# Patient Record
Sex: FEMALE | Race: WHITE | NOT HISPANIC OR LATINO | Employment: UNEMPLOYED | URBAN - METROPOLITAN AREA
[De-identification: names, ages, dates, MRNs, and addresses within clinical notes are randomized per-mention and may not be internally consistent; named-entity substitution may affect disease eponyms.]

---

## 2018-03-29 ENCOUNTER — OFFICE VISIT (OUTPATIENT)
Dept: INTERNAL MEDICINE | Facility: CLINIC | Age: 23
End: 2018-03-29

## 2018-03-29 VITALS
HEART RATE: 90 BPM | BODY MASS INDEX: 31.47 KG/M2 | HEIGHT: 55 IN | DIASTOLIC BLOOD PRESSURE: 68 MMHG | WEIGHT: 136 LBS | OXYGEN SATURATION: 100 % | TEMPERATURE: 98.7 F | SYSTOLIC BLOOD PRESSURE: 110 MMHG

## 2018-03-29 DIAGNOSIS — R53.83 FATIGUE, UNSPECIFIED TYPE: Primary | ICD-10-CM

## 2018-03-29 DIAGNOSIS — Z30.015 ENCOUNTER FOR INITIAL PRESCRIPTION OF VAGINAL RING HORMONAL CONTRACEPTIVE: ICD-10-CM

## 2018-03-29 DIAGNOSIS — Z78.9 VEGAN DIET: ICD-10-CM

## 2018-03-29 DIAGNOSIS — D50.8 IRON DEFICIENCY ANEMIA SECONDARY TO INADEQUATE DIETARY IRON INTAKE: ICD-10-CM

## 2018-03-29 DIAGNOSIS — N92.6 IRREGULAR MENSES: ICD-10-CM

## 2018-03-29 DIAGNOSIS — A60.00 HERPES SIMPLEX INFECTION OF GENITOURINARY SYSTEM: ICD-10-CM

## 2018-03-29 LAB
ALBUMIN SERPL-MCNC: 4.7 G/DL (ref 3.2–4.8)
ALBUMIN/GLOB SERPL: 1.6 G/DL (ref 1.5–2.5)
ALP SERPL-CCNC: 103 U/L (ref 25–100)
ALT SERPL W P-5'-P-CCNC: 17 U/L (ref 7–40)
ANION GAP SERPL CALCULATED.3IONS-SCNC: 7 MMOL/L (ref 3–11)
AST SERPL-CCNC: 24 U/L (ref 0–33)
BASOPHILS # BLD AUTO: 0.05 10*3/MM3 (ref 0–0.2)
BASOPHILS NFR BLD AUTO: 0.9 % (ref 0–1)
BILIRUB SERPL-MCNC: 0.4 MG/DL (ref 0.3–1.2)
BUN BLD-MCNC: 7 MG/DL (ref 9–23)
BUN/CREAT SERPL: 10 (ref 7–25)
CALCIUM SPEC-SCNC: 9.6 MG/DL (ref 8.7–10.4)
CHLORIDE SERPL-SCNC: 104 MMOL/L (ref 99–109)
CO2 SERPL-SCNC: 26 MMOL/L (ref 20–31)
CREAT BLD-MCNC: 0.7 MG/DL (ref 0.6–1.3)
DEPRECATED RDW RBC AUTO: 48.2 FL (ref 37–54)
EOSINOPHIL # BLD AUTO: 0.08 10*3/MM3 (ref 0–0.3)
EOSINOPHIL NFR BLD AUTO: 1.5 % (ref 0–3)
ERYTHROCYTE [DISTWIDTH] IN BLOOD BY AUTOMATED COUNT: 19 % (ref 11.3–14.5)
FERRITIN SERPL-MCNC: 2 NG/ML (ref 10–291)
GFR SERPL CREATININE-BSD FRML MDRD: 104 ML/MIN/1.73
GLOBULIN UR ELPH-MCNC: 3 GM/DL
GLUCOSE BLD-MCNC: 96 MG/DL (ref 70–100)
HCT VFR BLD AUTO: 28.4 % (ref 34.5–44)
HGB BLD-MCNC: 8.1 G/DL (ref 11.5–15.5)
HYPOCHROMIA BLD QL: NORMAL
IMM GRANULOCYTES # BLD: 0.01 10*3/MM3 (ref 0–0.03)
IMM GRANULOCYTES NFR BLD: 0.2 % (ref 0–0.6)
IRON 24H UR-MRATE: 8 MCG/DL (ref 50–175)
IRON SATN MFR SERPL: 2 % (ref 15–50)
LYMPHOCYTES # BLD AUTO: 1.83 10*3/MM3 (ref 0.6–4.8)
LYMPHOCYTES NFR BLD AUTO: 33.5 % (ref 24–44)
MCH RBC QN AUTO: 19.7 PG (ref 27–31)
MCHC RBC AUTO-ENTMCNC: 28.5 G/DL (ref 32–36)
MCV RBC AUTO: 69.1 FL (ref 80–99)
MICROCYTES BLD QL: NORMAL
MONOCYTES # BLD AUTO: 0.6 10*3/MM3 (ref 0–1)
MONOCYTES NFR BLD AUTO: 11 % (ref 0–12)
NEUTROPHILS # BLD AUTO: 2.89 10*3/MM3 (ref 1.5–8.3)
NEUTROPHILS NFR BLD AUTO: 52.9 % (ref 41–71)
PLAT MORPH BLD: NORMAL
PLATELET # BLD AUTO: 333 10*3/MM3 (ref 150–450)
PMV BLD AUTO: 10.3 FL (ref 6–12)
POTASSIUM BLD-SCNC: 4 MMOL/L (ref 3.5–5.5)
PROT SERPL-MCNC: 7.7 G/DL (ref 5.7–8.2)
RBC # BLD AUTO: 4.11 10*6/MM3 (ref 3.89–5.14)
SODIUM BLD-SCNC: 137 MMOL/L (ref 132–146)
TARGETS BLD QL SMEAR: NORMAL
TIBC SERPL-MCNC: 458 MCG/DL (ref 250–450)
TSH SERPL DL<=0.05 MIU/L-ACNC: 1.27 MIU/ML (ref 0.35–5.35)
VIT B12 BLD-MCNC: 459 PG/ML (ref 211–911)
WBC MORPH BLD: NORMAL
WBC NRBC COR # BLD: 5.46 10*3/MM3 (ref 3.5–10.8)

## 2018-03-29 PROCEDURE — 85007 BL SMEAR W/DIFF WBC COUNT: CPT | Performed by: NURSE PRACTITIONER

## 2018-03-29 PROCEDURE — 83550 IRON BINDING TEST: CPT | Performed by: NURSE PRACTITIONER

## 2018-03-29 PROCEDURE — 83540 ASSAY OF IRON: CPT | Performed by: NURSE PRACTITIONER

## 2018-03-29 PROCEDURE — 82607 VITAMIN B-12: CPT | Performed by: NURSE PRACTITIONER

## 2018-03-29 PROCEDURE — 82728 ASSAY OF FERRITIN: CPT | Performed by: NURSE PRACTITIONER

## 2018-03-29 PROCEDURE — 84443 ASSAY THYROID STIM HORMONE: CPT | Performed by: NURSE PRACTITIONER

## 2018-03-29 PROCEDURE — 99204 OFFICE O/P NEW MOD 45 MIN: CPT | Performed by: NURSE PRACTITIONER

## 2018-03-29 PROCEDURE — 85025 COMPLETE CBC W/AUTO DIFF WBC: CPT | Performed by: NURSE PRACTITIONER

## 2018-03-29 PROCEDURE — 80053 COMPREHEN METABOLIC PANEL: CPT | Performed by: NURSE PRACTITIONER

## 2018-03-29 RX ORDER — PYRIDOXINE HCL (VITAMIN B6) 50 MG
100 TABLET ORAL DAILY
COMMUNITY
End: 2022-12-07

## 2018-03-29 RX ORDER — VALACYCLOVIR HYDROCHLORIDE 500 MG/1
500 TABLET, FILM COATED ORAL 2 TIMES DAILY
Qty: 6 TABLET | Refills: 0 | Status: SHIPPED | OUTPATIENT
Start: 2018-03-29 | End: 2018-04-02

## 2018-03-29 RX ORDER — ETONOGESTREL AND ETHINYL ESTRADIOL 11.7; 2.7 MG/1; MG/1
INSERT, EXTENDED RELEASE VAGINAL
Qty: 3 EACH | Refills: 0 | Status: SHIPPED | OUTPATIENT
Start: 2018-03-29 | End: 2018-06-22 | Stop reason: SDUPTHER

## 2018-03-29 NOTE — PROGRESS NOTES
"Subjective   Melani Bhatt is a 23 y.o. female here to establish care.  Chief Complaint   Patient presents with   • Establish Care     would like to be checked for iron and B12 defiency and for soy sensitivity (Vegan for 3 years)     She reports that she will be going back to [place] in 2 weeks and is only in [place] visiting.     History of Present Illness     Genital herpes- used to valtrex daily for more than a year ago. Seh has not had any outbreaks except for a \"small one that resolved on its own.\" She would like to have valtrex available PRN.       Has been following a Vegan diet for the last 3 years. She takes OTC B12 once a week. Feels tired all the time. Wants to get her B12 and iron checked.    Contraception- was previously on nuva ring but has run out of it. She would like to renew her prescription for it.   Periods have been irregular with a menses every 12-15 dayswithout her contracaeption, LMP 3/17/18. Denies possibilty of pregnancy.         Health maintenance:   Influenza: declined  Tdap: 2016, td 2014  Pap: 8/2016  Tobacco use: declined        The following portions of the patient's history were reviewed and updated as appropriate: allergies, current medications, past family history, past medical history, past social history, past surgical history and problem list.    Review of Systems   Constitutional: Positive for fatigue. Negative for appetite change, chills and fever.   HENT: Negative for congestion, ear pain, rhinorrhea, sinus pressure and sore throat.    Eyes: Negative for itching and visual disturbance.   Respiratory: Negative for cough, shortness of breath and wheezing.    Cardiovascular: Negative for chest pain, palpitations and leg swelling.   Gastrointestinal: Negative for abdominal pain, constipation, diarrhea, nausea and vomiting.   Endocrine: Negative for cold intolerance and heat intolerance.   Genitourinary: Positive for menstrual problem. Negative for decreased urine volume, " "difficulty urinating, dyspareunia, dysuria, hematuria, pelvic pain, urgency, vaginal discharge and vaginal pain.   Musculoskeletal: Negative for arthralgias, back pain, joint swelling and myalgias.   Skin: Negative for rash and wound.   Allergic/Immunologic: Negative for environmental allergies and food allergies.   Neurological: Negative for dizziness and headaches.   Hematological: Negative for adenopathy. Does not bruise/bleed easily.   Psychiatric/Behavioral: Negative for sleep disturbance. The patient is not nervous/anxious.      Blood pressure 110/68, pulse 90, temperature 98.7 °F (37.1 °C), height 68.5 cm (26.97\"), weight 61.7 kg (136 lb), last menstrual period 03/17/2018, SpO2 100 %, not currently breastfeeding.    No Known Allergies  Past Medical History:   Diagnosis Date   • Allergic    • Genital herpes    • Heart murmur     as a child   • Pap smear for cervical cancer screening 08/2016   • Vegan diet      Past Surgical History:   Procedure Laterality Date   • WISDOM TOOTH EXTRACTION      x4     Family History   Problem Relation Age of Onset   • Breast cancer Mother    • Arthritis Father    • Hypertension Father    • Arthritis Maternal Grandmother    • Breast cancer Maternal Grandmother    • Hypertension Maternal Grandmother    • Hypertension Maternal Grandfather    • Arthritis Paternal Grandmother    • Ovarian cancer Paternal Grandmother    • Breast cancer Paternal Grandmother    • Heart attack Paternal Grandmother    • Hypertension Paternal Grandmother    • Hypertension Paternal Grandfather    • Stroke Paternal Grandfather      Social History     Social History   • Marital status: Single     Spouse name: N/A   • Number of children: N/A   • Years of education: N/A     Occupational History   • [employer]      Social History Main Topics   • Smoking status: Never Smoker   • Smokeless tobacco: Never Used   • Alcohol use 0.0 - 4.2 oz/week   • Drug use: No   • Sexual activity: Yes     Partners: Male     Birth " control/ protection: None     Other Topics Concern   • Not on file     Social History Narrative    Lives with her BF     Immunization History   Administered Date(s) Administered   • Td 01/01/2014   • Tdap 01/01/2016       Current Outpatient Prescriptions:   •  cyanocobalamin (CYANOCOBALAMIN) 100 MCG tablet tablet, Take 100 mcg by mouth Daily., Disp: , Rfl:   •  etonogestrel-ethinyl estradiol (NUVARING) 0.12-0.015 MG/24HR vaginal ring, Insert vaginally and leave in place for 3 consecutive weeks, then remove for 1 week., Disp: 3 each, Rfl: 0  •  ferrous sulfate 325 (65 FE) MG tablet, Take 1 tablet by mouth 3 (Three) Times a Day With Meals., Disp: 90 tablet, Rfl: 3  •  valACYclovir (VALTREX) 500 MG tablet, Take 1 tablet by mouth 2 (Two) Times a Day for 3 days. If needed for herpes outbreak. Start within 24 hours of symptom onset., Disp: 6 tablet, Rfl: 0    Objective   Physical Exam   Constitutional: She is oriented to person, place, and time. She appears well-developed and well-nourished. No distress.   HENT:   Head: Normocephalic and atraumatic.   Eyes: EOM are normal.   Neck: No JVD present.   Cardiovascular: Normal rate, regular rhythm, normal heart sounds and intact distal pulses.    No murmur heard.  Pulmonary/Chest: Effort normal and breath sounds normal. No respiratory distress. She exhibits no tenderness.   Abdominal: Soft. Bowel sounds are normal. She exhibits no distension. There is no tenderness.   Musculoskeletal: She exhibits no edema.   Neurological: She is alert and oriented to person, place, and time.   Skin: Skin is warm and dry. No rash noted. She is not diaphoretic. No erythema. No pallor.   Psychiatric: She has a normal mood and affect.   Nursing note and vitals reviewed.      Assessment/Plan   Melani was seen today for establish care.    Diagnoses and all orders for this visit:    Fatigue, unspecified type  -     CBC & Differential  -     Comprehensive Metabolic Panel  -     TSH  -     Vitamin  B12  -     Iron Profile  -     Ferritin  -     CBC Auto Differential  -     Scan Slide; Future  -     Scan Slide    Vegan diet  -     CBC & Differential  -     Comprehensive Metabolic Panel  -     TSH  -     Vitamin B12  -     Iron Profile  -     Ferritin  -     CBC Auto Differential  -     Scan Slide; Future  -     Scan Slide    Irregular menses    Encounter for initial prescription of vaginal ring hormonal contraceptive  -     etonogestrel-ethinyl estradiol (NUVARING) 0.12-0.015 MG/24HR vaginal ring; Insert vaginally and leave in place for 3 consecutive weeks, then remove for 1 week.    Herpes simplex infection of genitourinary system  -     valACYclovir (VALTREX) 500 MG tablet; Take 1 tablet by mouth 2 (Two) Times a Day for 3 days. If needed for herpes outbreak. Start within 24 hours of symptom onset.    Iron deficiency anemia secondary to inadequate dietary iron intake  -     ferrous sulfate 325 (65 FE) MG tablet; Take 1 tablet by mouth 3 (Three) Times a Day With Meals.        Outpatient Encounter Prescriptions as of 3/29/2018   Medication Sig Dispense Refill   • cyanocobalamin (CYANOCOBALAMIN) 100 MCG tablet tablet Take 100 mcg by mouth Daily.     • etonogestrel-ethinyl estradiol (NUVARING) 0.12-0.015 MG/24HR vaginal ring Insert vaginally and leave in place for 3 consecutive weeks, then remove for 1 week. 3 each 0   • ferrous sulfate 325 (65 FE) MG tablet Take 1 tablet by mouth 3 (Three) Times a Day With Meals. 90 tablet 3   • valACYclovir (VALTREX) 500 MG tablet Take 1 tablet by mouth 2 (Two) Times a Day for 3 days. If needed for herpes outbreak. Start within 24 hours of symptom onset. 6 tablet 0     No facility-administered encounter medications on file as of 3/29/2018.        Labs ordered- show KEMAL, will start on ferrous sulfate TID  will likely need to take 3-6 months at this dose and then can discuss what dose for maintenance if she continues vegan diet)  Will need repeat labs done in about a month to be  sure blood counts are rising.  B12 is ok (middle of normal range). Continue her B12 supplement as she has been doing  Valtrex ordered X 1 occurrence . No refills  nuvaring X 1 m- no refills- due for pap  Return in about 1 week (around 4/5/2018) for Annual physical, pap.      Plan of care discussed with pt. They verbalized understanding and agreement.

## 2018-03-30 ENCOUNTER — TELEPHONE (OUTPATIENT)
Dept: INTERNAL MEDICINE | Facility: CLINIC | Age: 23
End: 2018-03-30

## 2018-03-30 RX ORDER — FERROUS SULFATE 325(65) MG
325 TABLET ORAL
Qty: 90 TABLET | Refills: 3 | Status: SHIPPED | OUTPATIENT
Start: 2018-03-30 | End: 2022-12-07

## 2018-03-31 PROBLEM — D50.8 IRON DEFICIENCY ANEMIA SECONDARY TO INADEQUATE DIETARY IRON INTAKE: Status: ACTIVE | Noted: 2018-03-31

## 2018-03-31 PROBLEM — IMO0001 CONTRACEPTION: Status: ACTIVE | Noted: 2018-03-31

## 2018-03-31 PROBLEM — Z78.9 VEGAN DIET: Status: ACTIVE | Noted: 2018-03-31

## 2018-04-02 ENCOUNTER — OFFICE VISIT (OUTPATIENT)
Dept: INTERNAL MEDICINE | Facility: CLINIC | Age: 23
End: 2018-04-02

## 2018-04-02 VITALS
DIASTOLIC BLOOD PRESSURE: 64 MMHG | HEIGHT: 55 IN | BODY MASS INDEX: 31.47 KG/M2 | WEIGHT: 136 LBS | SYSTOLIC BLOOD PRESSURE: 112 MMHG | HEART RATE: 74 BPM | TEMPERATURE: 97.5 F | OXYGEN SATURATION: 97 %

## 2018-04-02 DIAGNOSIS — Z01.419 GYNECOLOGIC EXAM NORMAL: ICD-10-CM

## 2018-04-02 DIAGNOSIS — Z13.220 LIPID SCREENING: Primary | ICD-10-CM

## 2018-04-02 DIAGNOSIS — Z11.3 SCREEN FOR STD (SEXUALLY TRANSMITTED DISEASE): ICD-10-CM

## 2018-04-02 DIAGNOSIS — Z00.00 ANNUAL PHYSICAL EXAM: ICD-10-CM

## 2018-04-02 DIAGNOSIS — E55.9 VITAMIN D DEFICIENCY: ICD-10-CM

## 2018-04-02 DIAGNOSIS — R82.90 ABNORMAL URINALYSIS: ICD-10-CM

## 2018-04-02 LAB
25(OH)D3 SERPL-MCNC: 22.2 NG/ML
BILIRUB BLD-MCNC: NEGATIVE MG/DL
CHOLEST SERPL-MCNC: 111 MG/DL (ref 0–200)
CLARITY, POC: CLEAR
COLOR UR: ABNORMAL
GLUCOSE UR STRIP-MCNC: NEGATIVE MG/DL
KETONES UR QL: ABNORMAL
LEUKOCYTE EST, POC: ABNORMAL
NITRITE UR-MCNC: POSITIVE MG/ML
PH UR: 6 [PH] (ref 5–8)
PROT UR STRIP-MCNC: ABNORMAL MG/DL
RBC # UR STRIP: ABNORMAL /UL
SP GR UR: 1.02 (ref 1–1.03)
UROBILINOGEN UR QL: NORMAL

## 2018-04-02 PROCEDURE — 36415 COLL VENOUS BLD VENIPUNCTURE: CPT | Performed by: NURSE PRACTITIONER

## 2018-04-02 PROCEDURE — 81003 URINALYSIS AUTO W/O SCOPE: CPT | Performed by: NURSE PRACTITIONER

## 2018-04-02 PROCEDURE — 82465 ASSAY BLD/SERUM CHOLESTEROL: CPT | Performed by: NURSE PRACTITIONER

## 2018-04-02 PROCEDURE — 82306 VITAMIN D 25 HYDROXY: CPT | Performed by: NURSE PRACTITIONER

## 2018-04-02 PROCEDURE — 99395 PREV VISIT EST AGE 18-39: CPT | Performed by: NURSE PRACTITIONER

## 2018-04-02 NOTE — PROGRESS NOTES
Patient Care Team:  QUINN Butts as PCP - General (Nurse Practitioner)     Chief complaint: Patient is in today for a physical          Patient is a 23 y.o. female who presents for her yearly physical exam.     HPI    She reports that she will be going back to [place] in 1.5 weeks and is only in [facility].      Has been following a Vegan diet for the last 3 years. She takes OTC B12 once a week. Feels tired all the time. Noted to have iron deficiency on labs last week. She has not started her Ferrous sulfate TID yet.      Contraception- she restarted her nuva ring.  LMP 3/17/18. Denies possibilty of pregnancy. Would like to have STD screening included on her pap  Completed SBE monthly. Denies any breast abnormalities.        Health maintenance:   Influenza: declined  Tdap: 2016, td 2014  Pap: 8/2016  Tobacco use: declined  Diet: vegan   Exercise:daily    Review of Systems   Constitutional: Positive for fatigue. Negative for appetite change, chills and fever.   HENT: Negative for congestion, ear pain, rhinorrhea, sinus pressure and sore throat.    Eyes: Negative for itching and visual disturbance.   Respiratory: Negative for cough, shortness of breath and wheezing.    Cardiovascular: Negative for chest pain, palpitations and leg swelling.        No breast abnormalities noted   Gastrointestinal: Negative for abdominal distention, abdominal pain, constipation, diarrhea, nausea and vomiting.   Endocrine: Negative for cold intolerance and heat intolerance.   Genitourinary: Positive for menstrual problem. Negative for decreased urine volume, difficulty urinating, dyspareunia, dysuria, enuresis, frequency, genital sores, hematuria, pelvic pain, urgency, vaginal discharge and vaginal pain.   Musculoskeletal: Negative for arthralgias, back pain, joint swelling and myalgias.   Skin: Negative for rash and wound.   Allergic/Immunologic: Negative for environmental allergies and food allergies.   Neurological:  Negative for dizziness and headaches.   Hematological: Negative for adenopathy. Does not bruise/bleed easily.   Psychiatric/Behavioral: Negative for sleep disturbance. The patient is not nervous/anxious.          Health maintenance:    History  Past Medical History:   Diagnosis Date   • Allergic    • Genital herpes    • Heart murmur     as a child   • Pap smear for cervical cancer screening 08/2016   • Vegan diet       Past Surgical History:   Procedure Laterality Date   • WISDOM TOOTH EXTRACTION      x4      No Known Allergies   Family History   Problem Relation Age of Onset   • Breast cancer Mother    • Arthritis Father    • Hypertension Father    • Arthritis Maternal Grandmother    • Breast cancer Maternal Grandmother    • Hypertension Maternal Grandmother    • Hypertension Maternal Grandfather    • Arthritis Paternal Grandmother    • Ovarian cancer Paternal Grandmother    • Breast cancer Paternal Grandmother    • Heart attack Paternal Grandmother    • Hypertension Paternal Grandmother    • Hypertension Paternal Grandfather    • Stroke Paternal Grandfather      Social History     Social History   • Marital status: Single     Spouse name: N/A   • Number of children: N/A   • Years of education: N/A     Occupational History   • [employer]      Social History Main Topics   • Smoking status: Never Smoker   • Smokeless tobacco: Never Used   • Alcohol use 0.0 - 4.2 oz/week   • Drug use: No   • Sexual activity: Yes     Partners: Male     Birth control/ protection: None     Other Topics Concern   • Not on file     Social History Narrative    Lives with her BF        Current Outpatient Prescriptions:   •  cyanocobalamin (CYANOCOBALAMIN) 100 MCG tablet tablet, Take 100 mcg by mouth Daily., Disp: , Rfl:   •  etonogestrel-ethinyl estradiol (NUVARING) 0.12-0.015 MG/24HR vaginal ring, Insert vaginally and leave in place for 3 consecutive weeks, then remove for 1 week., Disp: 3 each, Rfl: 0  •  ferrous sulfate 325 (65 FE) MG  "tablet, Take 1 tablet by mouth 3 (Three) Times a Day With Meals., Disp: 90 tablet, Rfl: 3  •  valACYclovir (VALTREX) 500 MG tablet, Take 1 tablet by mouth 2 (Two) Times a Day for 3 days. If needed for herpes outbreak. Start within 24 hours of symptom onset., Disp: 6 tablet, Rfl: 0   Immunization History   Administered Date(s) Administered   • Td 01/01/2014   • Tdap 01/01/2016                   /64 (BP Location: Right arm, Patient Position: Sitting, Cuff Size: Adult)   Pulse 74   Temp 97.5 °F (36.4 °C) (Temporal Artery )   Ht 68.5 cm (26.97\")   Wt 61.7 kg (136 lb)   LMP 03/17/2018 (Exact Date)   SpO2 97%   .47 kg/m²       Physical Exam   Constitutional: She is oriented to person, place, and time. She appears well-developed and well-nourished. No distress.   HENT:   Head: Normocephalic and atraumatic.   Right Ear: External ear normal.   Left Ear: External ear normal.   Mouth/Throat: Oropharynx is clear and moist. No oropharyngeal exudate.   Eyes: Conjunctivae and EOM are normal. Right eye exhibits no discharge. Left eye exhibits no discharge. No scleral icterus.   Neck: Normal range of motion. Neck supple. No JVD present. No tracheal deviation present. No thyromegaly present.   Cardiovascular: Normal rate, regular rhythm, normal heart sounds and intact distal pulses.  Exam reveals no friction rub.    No murmur heard.  Pulmonary/Chest: Effort normal and breath sounds normal. No respiratory distress. She has no wheezes. She has no rales. She exhibits no tenderness. Right breast exhibits no inverted nipple, no mass, no nipple discharge, no skin change and no tenderness. Left breast exhibits no inverted nipple, no mass, no nipple discharge, no skin change and no tenderness. Breasts are symmetrical.   Abdominal: Soft. Normal appearance and bowel sounds are normal. She exhibits no distension and no mass. There is no hepatosplenomegaly. There is no tenderness. No hernia.   Genitourinary:   Genitourinary " Comments: External genitalia without erythema, masses, exudate or discharge. Vaginal vault is without discharge. Cervix is of normal color without lesion noted.There is no bleeding noted. Uterus is noted to be of normal size and nontender. No cervical motion tenderness is seen. No masses are palpated. The adnexa are without masses or tenderness.   Musculoskeletal: Normal range of motion. She exhibits no edema, tenderness or deformity.   Lymphadenopathy:     She has no cervical adenopathy.   Neurological: She is alert and oriented to person, place, and time. She has normal reflexes. She displays normal reflexes.   Skin: Skin is warm and dry. No rash noted. She is not diaphoretic. No erythema. No pallor.   Psychiatric: She has a normal mood and affect. Her behavior is normal. Judgment and thought content normal.   Nursing note and vitals reviewed.                Diagnoses and all orders for this visit:    Lipid screening  -     Cholesterol, Total    Vitamin D deficiency  -     Vitamin D 25 Hydroxy    Gynecologic exam normal  -     Liquid-based Pap Smear, Screening    Annual physical exam  -     Vitamin D 25 Hydroxy  -     Cholesterol, Total  -     Liquid-based Pap Smear, Screening  -     POC Urinalysis Dipstick, Automated    Screen for STD (sexually transmitted disease)  -     Liquid-based Pap Smear, Screening    Abnormal urinalysis  -     Urine Culture - Urine, Urine, Clean Catch         will start on ferrous sulfate TID,  will likely need to take 3-6 months at this dose and then can discuss what dose for maintenance if she continues vegan diet.   Will need repeat labs done in about a month to be sure blood counts are rising.  She has an appt scheduled in [place] with a PCP at the end of this month.  She does not plan on coming back to [place].   Labs sent and prior labs reviewed  Immunizations and screenings;pap completed today.   Counseling: diet  Follow up: Return in about 1 month (around 5/2/2018), or establish  with a new PCP.        Rita Cazares, APRN   4/2/2018   5:36 PM

## 2018-04-04 ENCOUNTER — TELEPHONE (OUTPATIENT)
Dept: INTERNAL MEDICINE | Facility: CLINIC | Age: 23
End: 2018-04-04

## 2018-04-04 NOTE — TELEPHONE ENCOUNTER
----- Message from QUINN Butts sent at 4/4/2018  1:15 PM EDT -----  Please let her know that her cholesterol looks good.  Her vitamin D is a little low.  I would have her take vitamin D3 5000 units over-the-counter daily for this.

## 2018-04-23 ENCOUNTER — TELEPHONE (OUTPATIENT)
Dept: INTERNAL MEDICINE | Facility: CLINIC | Age: 23
End: 2018-04-23

## 2018-04-23 NOTE — TELEPHONE ENCOUNTER
----- Message from QUINN Butts sent at 4/11/2018  2:35 PM EDT -----  Please let her know pap was normal. STI screen normal too. Repeat pap in 1 year

## 2018-06-12 ENCOUNTER — TELEPHONE (OUTPATIENT)
Dept: INTERNAL MEDICINE | Facility: CLINIC | Age: 23
End: 2018-06-12

## 2018-06-12 NOTE — TELEPHONE ENCOUNTER
PT HAS A NEW PHARMACY [facility] [address] [place] [address] SHE IS DOING A 6 MONTH INTERN THERE

## 2018-06-20 ENCOUNTER — TELEPHONE (OUTPATIENT)
Dept: INTERNAL MEDICINE | Facility: CLINIC | Age: 23
End: 2018-06-20

## 2018-06-20 DIAGNOSIS — D50.8 IRON DEFICIENCY ANEMIA SECONDARY TO INADEQUATE DIETARY IRON INTAKE: ICD-10-CM

## 2018-06-20 DIAGNOSIS — Z30.015 ENCOUNTER FOR INITIAL PRESCRIPTION OF VAGINAL RING HORMONAL CONTRACEPTIVE: ICD-10-CM

## 2018-06-22 DIAGNOSIS — Z30.015 ENCOUNTER FOR INITIAL PRESCRIPTION OF VAGINAL RING HORMONAL CONTRACEPTIVE: ICD-10-CM

## 2018-06-22 RX ORDER — ETONOGESTREL AND ETHINYL ESTRADIOL 11.7; 2.7 MG/1; MG/1
INSERT, EXTENDED RELEASE VAGINAL
Qty: 3 EACH | Refills: 2 | Status: SHIPPED | OUTPATIENT
Start: 2018-06-22 | End: 2018-08-29 | Stop reason: SDUPTHER

## 2018-06-22 NOTE — TELEPHONE ENCOUNTER
I tried to call her this am, but no answer, I sent in 3 months of her birth control. As she and I discussed in her last appt, she would need to get a PCP there because she needed repeat labs to evaluate her anemia. She told me she was not coming back here and she already had an appt with PCP in [place]. She needs to see someone for the anemia

## 2018-08-19 DIAGNOSIS — Z30.015 ENCOUNTER FOR INITIAL PRESCRIPTION OF VAGINAL RING HORMONAL CONTRACEPTIVE: ICD-10-CM

## 2018-08-20 RX ORDER — ETONOGESTREL/ETHINYL ESTRADIOL .12-.015MG
RING, VAGINAL VAGINAL
Qty: 3 EACH | Refills: 1 | OUTPATIENT
Start: 2018-08-20

## 2018-08-27 ENCOUNTER — TELEPHONE (OUTPATIENT)
Dept: INTERNAL MEDICINE | Facility: CLINIC | Age: 23
End: 2018-08-27

## 2018-08-27 NOTE — TELEPHONE ENCOUNTER
MSIvis SEGOVIA SAYS THAT PHARMACY SAYS THAT THEY CAN NOT FILL HER RX OF NUVARING, PLEASE CONTACT OFFICE FOR MORE INFO. PLEASE ADVISE

## 2018-08-29 DIAGNOSIS — Z30.015 ENCOUNTER FOR INITIAL PRESCRIPTION OF VAGINAL RING HORMONAL CONTRACEPTIVE: ICD-10-CM

## 2018-08-29 RX ORDER — ETONOGESTREL AND ETHINYL ESTRADIOL 11.7; 2.7 MG/1; MG/1
INSERT, EXTENDED RELEASE VAGINAL
Qty: 3 EACH | Refills: 0 | Status: SHIPPED | OUTPATIENT
Start: 2018-08-29 | End: 2018-08-29 | Stop reason: SDUPTHER

## 2018-11-21 ENCOUNTER — TELEPHONE (OUTPATIENT)
Dept: INTERNAL MEDICINE | Facility: CLINIC | Age: 23
End: 2018-11-21

## 2018-11-21 NOTE — TELEPHONE ENCOUNTER
Received refill request from pharmacy for Nuva Ring.  Called pharm to deny request.  Pt has moved to [place] about 6 months ago.  We did send in refill in August because pt did not find PCP yet.  MB has denied this.

## 2018-11-27 DIAGNOSIS — Z30.015 ENCOUNTER FOR INITIAL PRESCRIPTION OF VAGINAL RING HORMONAL CONTRACEPTIVE: ICD-10-CM

## 2018-11-28 ENCOUNTER — TELEPHONE (OUTPATIENT)
Dept: INTERNAL MEDICINE | Facility: CLINIC | Age: 23
End: 2018-11-28

## 2018-11-28 RX ORDER — ETONOGESTREL AND ETHINYL ESTRADIOL 11.7; 2.7 MG/1; MG/1
INSERT, EXTENDED RELEASE VAGINAL
Qty: 3 EACH | Refills: 0 | OUTPATIENT
Start: 2018-11-28

## 2018-11-28 NOTE — TELEPHONE ENCOUNTER
PT NEEDS NUVARING AND ALSO WANTS TO KNOW WHY EVERY MONTH SHE HAS TO CALL THE OFFICE BECAUSE OF THIS BEING DENIED AND SHE JUST SAW PENG

## 2018-11-28 NOTE — TELEPHONE ENCOUNTER
Pt states she was seen in April and was to get her Nuvaring for 1 year.  She says she is only only [place] for 1 year  For a gap period.   She only needs the Nuvaring up until April.  She says she never said she had appointment with another provider or was going to get another provider.

## 2018-11-29 DIAGNOSIS — Z30.015 ENCOUNTER FOR INITIAL PRESCRIPTION OF VAGINAL RING HORMONAL CONTRACEPTIVE: ICD-10-CM

## 2018-11-29 RX ORDER — ETONOGESTREL AND ETHINYL ESTRADIOL 11.7; 2.7 MG/1; MG/1
INSERT, EXTENDED RELEASE VAGINAL
Qty: 3 EACH | Refills: 4 | Status: SHIPPED | OUTPATIENT
Start: 2018-11-29 | End: 2020-10-07 | Stop reason: ALTCHOICE

## 2018-11-29 NOTE — TELEPHONE ENCOUNTER
Please remind her that She was anemicwhen I saw her in office.  She needs to see someone to get her labs checked!!! That is why I wanted her to get her prescriptions there too.   Please reiterate this to her. You can send in the remaining refills on her nuvaring to last until April, but she will not get anything further until I see her.

## 2020-10-07 ENCOUNTER — OFFICE VISIT (OUTPATIENT)
Dept: OBSTETRICS AND GYNECOLOGY | Facility: CLINIC | Age: 25
End: 2020-10-07

## 2020-10-07 VITALS
DIASTOLIC BLOOD PRESSURE: 64 MMHG | HEIGHT: 70 IN | WEIGHT: 161 LBS | SYSTOLIC BLOOD PRESSURE: 120 MMHG | BODY MASS INDEX: 23.05 KG/M2

## 2020-10-07 DIAGNOSIS — F32.A DEPRESSION, UNSPECIFIED DEPRESSION TYPE: Primary | ICD-10-CM

## 2020-10-07 PROCEDURE — 99213 OFFICE O/P EST LOW 20 MIN: CPT | Performed by: OBSTETRICS & GYNECOLOGY

## 2020-10-07 RX ORDER — NORGESTIMATE AND ETHINYL ESTRADIOL 0.25-0.035
1 KIT ORAL DAILY
COMMUNITY
End: 2021-04-08 | Stop reason: SDUPTHER

## 2020-10-09 ENCOUNTER — TELEPHONE (OUTPATIENT)
Dept: OBSTETRICS AND GYNECOLOGY | Facility: CLINIC | Age: 25
End: 2020-10-09

## 2020-12-02 ENCOUNTER — OFFICE VISIT (OUTPATIENT)
Dept: OBSTETRICS AND GYNECOLOGY | Facility: CLINIC | Age: 25
End: 2020-12-02

## 2020-12-02 VITALS — SYSTOLIC BLOOD PRESSURE: 120 MMHG | DIASTOLIC BLOOD PRESSURE: 66 MMHG | BODY MASS INDEX: 23.7 KG/M2 | WEIGHT: 164 LBS

## 2020-12-02 DIAGNOSIS — F32.A DEPRESSION, UNSPECIFIED DEPRESSION TYPE: Primary | ICD-10-CM

## 2020-12-02 PROCEDURE — 99212 OFFICE O/P EST SF 10 MIN: CPT | Performed by: OBSTETRICS & GYNECOLOGY

## 2020-12-02 NOTE — PROGRESS NOTES
Chief Complaint   Patient presents with   • Follow-up     depression       Subjective   HPI  Melani Bhatt is a 25 y.o. female, , who presents for follow up after starting Zoloft Oct 7th.      She states she has had an increase in energy levels and overall improvement in her mood. Denies SI.     Her last LMP was Patient's last menstrual period was 2020..      Additional OB/GYN History     Last Pap :   Last Completed Pap Smear       Status Date      PAP SMEAR Done 2020 negative.     Patient has more history with this topic...          Last mammogram:   Last Completed Mammogram     Patient has no health maintenance due at this time        Tobacco Usage?: No   OB History        0    Para   0    Term   0       0    AB   0    Living   0       SAB   0    TAB   0    Ectopic   0    Molar   0    Multiple   0    Live Births   0                Health Maintenance   Topic Date Due   • HEPATITIS C SCREENING  2018   • ANNUAL PHYSICAL  2019   • Annual Gynecologic Pelvic and Breast Exam  2019   • PAP SMEAR  2023   • TDAP/TD VACCINES (3 - Td) 2026   • INFLUENZA VACCINE  Completed   • Pneumococcal Vaccine 0-64  Aged Out   • HPV VACCINES  Discontinued       The additional following portions of the patient's history were reviewed and updated as appropriate: allergies, current medications, past family history, past medical history, past social history, past surgical history and problem list.    Review of Systems   Constitutional: Negative.    HENT: Negative.    Eyes: Negative.    Respiratory: Negative.    Gastrointestinal: Negative.    Endocrine: Negative.    Genitourinary: Negative.    Neurological: Negative.    Psychiatric/Behavioral: Positive for depressed mood.       I have reviewed and agree with the HPI, ROS, and historical information as entered above. Sheyla Morin MD    Objective   /66   Wt 74.4 kg (164 lb)   LMP 2020   BMI 23.70 kg/m²      Physical Exam  Vitals signs and nursing note reviewed.   Constitutional:       Appearance: Normal appearance. She is well-developed.   HENT:      Head: Normocephalic and atraumatic.   Neck:      Musculoskeletal: Normal range of motion.   Pulmonary:      Effort: Pulmonary effort is normal.      Breath sounds: Normal breath sounds.   Abdominal:      General: There is no distension.      Palpations: Abdomen is soft. Abdomen is not rigid. There is no mass.      Tenderness: There is no abdominal tenderness. There is no guarding or rebound.   Skin:     General: Skin is warm and dry.   Neurological:      Mental Status: She is alert and oriented to person, place, and time.   Psychiatric:         Mood and Affect: Mood normal.         Behavior: Behavior normal.         Assessment/Plan     Assessment     Problem List Items Addressed This Visit        Other    Depression - Primary    Overview     Started on Zoloft on 10/7/2020.  Patient in counseling.  Reports great improvement and would like to continue her medicine.  We reiterated the importance of continuing healthy diet and exercise.         Relevant Medications    sertraline (Zoloft) 50 MG tablet          Plan     Return for Annual physical.      Sheyla Morin MD  12/02/2020

## 2021-03-05 DIAGNOSIS — F32.A DEPRESSION, UNSPECIFIED DEPRESSION TYPE: ICD-10-CM

## 2021-04-08 ENCOUNTER — OFFICE VISIT (OUTPATIENT)
Dept: OBSTETRICS AND GYNECOLOGY | Facility: CLINIC | Age: 26
End: 2021-04-08

## 2021-04-08 VITALS
WEIGHT: 158 LBS | BODY MASS INDEX: 22.62 KG/M2 | DIASTOLIC BLOOD PRESSURE: 60 MMHG | SYSTOLIC BLOOD PRESSURE: 100 MMHG | HEIGHT: 70 IN

## 2021-04-08 DIAGNOSIS — A60.04 HERPES, VULVAR: ICD-10-CM

## 2021-04-08 DIAGNOSIS — Z30.41 ENCOUNTER FOR SURVEILLANCE OF CONTRACEPTIVE PILLS: ICD-10-CM

## 2021-04-08 DIAGNOSIS — F32.A DEPRESSION, UNSPECIFIED DEPRESSION TYPE: ICD-10-CM

## 2021-04-08 DIAGNOSIS — Z11.3 SCREEN FOR STD (SEXUALLY TRANSMITTED DISEASE): ICD-10-CM

## 2021-04-08 DIAGNOSIS — Z01.419 WOMEN'S ANNUAL ROUTINE GYNECOLOGICAL EXAMINATION: Primary | ICD-10-CM

## 2021-04-08 PROCEDURE — 99395 PREV VISIT EST AGE 18-39: CPT | Performed by: OBSTETRICS & GYNECOLOGY

## 2021-04-08 PROCEDURE — 99212 OFFICE O/P EST SF 10 MIN: CPT | Performed by: OBSTETRICS & GYNECOLOGY

## 2021-04-08 RX ORDER — NORGESTIMATE AND ETHINYL ESTRADIOL 0.25-0.035
1 KIT ORAL DAILY
Qty: 84 TABLET | Refills: 4 | Status: SHIPPED | OUTPATIENT
Start: 2021-04-08 | End: 2022-04-12

## 2021-04-08 NOTE — PROGRESS NOTES
GYN Annual Exam     CC - Here for annual exam.        HPI  Melani Bhatt is a 26 y.o. female, , who presents for annual well woman exam. Patient's last menstrual period was 2021. Periods are regular every 25-35 days, lasting 4 days.   Dysmenorrhea:moderate, occurring premenstrually and first 1-2 days of flow.  Patient reports problems with: depression. She started on Zoloft in Dec and reports feeling much better since starting that.  There were no changes to her medical or surgical history since her last visit.. Partner Status: Marital Status: single.  She is sexually active. She has not had new partners since her last STD testing. She does desire STD testing. She has had her HPV vaccines.     Additional OB/GYN History   Current contraception: contraceptive methods: OCP (estrogen/progesterone)  Desires to: continue contraception  Last Pap :   Last Completed Pap Smear       Status Date      PAP SMEAR Done 2020 negative.     Patient has more history with this topic...        History of abnormal Pap smear: no  Family history of uterine, colon, breast, or ovarian cancer: yes - breast cancer in her mother, maternal grandmother, and paternal grandmother. Paternal grandmother also had ovarian cancer  Performs monthly Self-Breast Exam: yes  Exercises Regularly:yes  Feelings of Anxiety or Depression: yes - feels much better with Zoloft. The stress of her job still makes her anxious but she is feeling much better  Tobacco Usage?: No   OB History        0    Para   0    Term   0       0    AB   0    Living   0       SAB   0    TAB   0    Ectopic   0    Molar   0    Multiple   0    Live Births   0                Health Maintenance   Topic Date Due   • HEPATITIS C SCREENING  Never done   • ANNUAL PHYSICAL  2019   • COVID-19 Vaccine (2 - Pfizer 2-dose series) 2021   • INFLUENZA VACCINE  2021   • Annual Gynecologic Pelvic and Breast Exam  2022   • PAP SMEAR  2023   •  "TDAP/TD VACCINES (3 - Td) 01/01/2026   • Pneumococcal Vaccine 0-64  Aged Out   • MENINGOCOCCAL VACCINE  Aged Out   • HPV VACCINES  Discontinued       The additional following portions of the patient's history were reviewed and updated as appropriate: allergies, current medications, past family history, past medical history, past social history, past surgical history and problem list.    Review of Systems   Constitutional: Negative.    HENT: Negative.    Eyes: Negative.    Respiratory: Negative.    Cardiovascular: Negative.    Gastrointestinal: Negative.    Endocrine: Negative.    Genitourinary: Negative.    Musculoskeletal: Negative.    Skin: Negative.    Allergic/Immunologic: Negative.    Neurological: Negative.    Hematological: Negative.    Psychiatric/Behavioral: Negative.  Positive for stress.        Depression     All other systems reviewed and are negative.     I have reviewed and agree with the HPI, ROS, and historical information as entered above. Sheyla Morin MD    Objective   /60   Ht 177.2 cm (69.75\")   Wt 71.7 kg (158 lb)   LMP 03/31/2021   BMI 22.83 kg/m²     Physical Exam  Vitals and nursing note reviewed. Exam conducted with a chaperone present.   Constitutional:       Appearance: She is well-developed.   HENT:      Head: Normocephalic and atraumatic.   Neck:      Thyroid: No thyroid mass or thyromegaly.   Cardiovascular:      Rate and Rhythm: Normal rate and regular rhythm.      Heart sounds: No murmur heard.     Pulmonary:      Effort: Pulmonary effort is normal. No retractions.      Breath sounds: Normal breath sounds. No wheezing, rhonchi or rales.   Chest:      Chest wall: No mass or tenderness.      Breasts:         Right: Normal. No mass, nipple discharge, skin change or tenderness.         Left: Normal. No mass, nipple discharge, skin change or tenderness.   Abdominal:      General: Bowel sounds are normal.      Palpations: Abdomen is soft. Abdomen is not rigid. There is no " mass.      Tenderness: There is no abdominal tenderness. There is no guarding.      Hernia: No hernia is present. There is no hernia in the left inguinal area or right inguinal area.   Genitourinary:     General: Normal vulva.      Exam position: Lithotomy position.      Pubic Area: No rash.       Labia:         Right: No rash, tenderness or lesion.         Left: No rash, tenderness or lesion.       Urethra: No urethral pain or urethral swelling.      Vagina: Normal. No vaginal discharge or lesions.      Cervix: No cervical motion tenderness, discharge, lesion or cervical bleeding.      Uterus: Normal. Not enlarged, not fixed and not tender.       Adnexa:         Right: No mass, tenderness or fullness.          Left: No mass, tenderness or fullness.        Rectum: No external hemorrhoid.   Musculoskeletal:      Cervical back: Normal range of motion. No muscular tenderness.   Neurological:      Mental Status: She is alert and oriented to person, place, and time.   Psychiatric:         Behavior: Behavior normal.            Assessment and Plan    Problem List Items Addressed This Visit        Mental Health    Depression    Overview     Started on Zoloft on 10/7/2020.  Patient in counseling.  Reports great improvement and would like to continue her medicine.  We reiterated the importance of continuing healthy diet and exercise.         Relevant Medications    sertraline (Zoloft) 50 MG tablet       Other    Herpes, vulvar    Overview     No outbreaks since age 20.  Does not take daily prophylaxis.           Other Visit Diagnoses     Women's annual routine gynecological examination    -  Primary    Relevant Orders    Pap IG, Ct-Ng, Rfx HPV ASCU    Screen for STD (sexually transmitted disease)        Encounter for surveillance of contraceptive pills              1. GYN annual well woman exam.   2. Encouraged use of condoms for STD prevention.  3. OCP's/Vaginal Ring - Discussed side effects of nausea, BTB, headaches, breast  tenderness and slight weight gain in the first three cycles.  Understands risks of blood clots, stroke, and theoretical risk of breast cancer.  Denies family history of blood clots.  4. Reviewed monthly self breast exams.  Instructed to call with lumps, pain, or breast discharge.    5. Reviewed exercise as a preventative health measures.   6. Recommended use of Vitamin D replacement and getting adequate calcium in her diet. (1500mg)  7. Reccommended Flu Vaccine in Fall of each year.  8. RTC in 1 year or PRN with problems      Sheyla Morin MD  04/08/2021

## 2021-04-15 DIAGNOSIS — Z01.419 WOMEN'S ANNUAL ROUTINE GYNECOLOGICAL EXAMINATION: ICD-10-CM

## 2022-04-11 PROBLEM — IMO0001 CONTRACEPTION: Status: RESOLVED | Noted: 2018-03-31 | Resolved: 2022-04-11

## 2022-04-12 ENCOUNTER — OFFICE VISIT (OUTPATIENT)
Dept: OBSTETRICS AND GYNECOLOGY | Facility: CLINIC | Age: 27
End: 2022-04-12

## 2022-04-12 VITALS
DIASTOLIC BLOOD PRESSURE: 70 MMHG | HEIGHT: 70 IN | SYSTOLIC BLOOD PRESSURE: 118 MMHG | WEIGHT: 160 LBS | BODY MASS INDEX: 22.9 KG/M2

## 2022-04-12 DIAGNOSIS — A60.04 HERPES, VULVAR: ICD-10-CM

## 2022-04-12 DIAGNOSIS — F32.A DEPRESSION, UNSPECIFIED DEPRESSION TYPE: ICD-10-CM

## 2022-04-12 DIAGNOSIS — Z01.419 WOMEN'S ANNUAL ROUTINE GYNECOLOGICAL EXAMINATION: Primary | ICD-10-CM

## 2022-04-12 DIAGNOSIS — Z30.41 ENCOUNTER FOR SURVEILLANCE OF CONTRACEPTIVE PILLS: ICD-10-CM

## 2022-04-12 PROCEDURE — 99395 PREV VISIT EST AGE 18-39: CPT | Performed by: OBSTETRICS & GYNECOLOGY

## 2022-04-12 PROCEDURE — 99212 OFFICE O/P EST SF 10 MIN: CPT | Performed by: OBSTETRICS & GYNECOLOGY

## 2022-04-12 RX ORDER — LEVONORGESTREL / ETHINYL ESTRADIOL AND ETHINYL ESTRADIOL 150-30(84)
1 KIT ORAL DAILY
Qty: 91 EACH | Refills: 4 | Status: SHIPPED | OUTPATIENT
Start: 2022-04-12 | End: 2022-05-11 | Stop reason: SDUPTHER

## 2022-04-12 NOTE — PROGRESS NOTES
GYN Annual Exam     CC - Here for annual exam.        HPI  Melani Bhatt is a 27 y.o. female, , who presents for annual well woman exam. 2022.  Periods are regular every 25-35 days, lasting 5 days. .  Dysmenorrhea:moderate, occurring premenstrually and first 1-2 days of flow.  Patient reports problems with: bloating and depression.  There were no changes to her medical or surgical history since her last visit.. Partner Status: Marital Status: single.  She is sexually active. She has had new partners since her last STD testing. She does desire STD testing.      She feels like the week prior to her menses she is more depressed, irritable and agitated. She has been researching and feels like she may have ADHD and depression or PMDD. Dr. Morin has prescribed Zoloft in the past but she desires referral.  She denies SI/HI    Additional OB/GYN History   Current contraception: contraceptive methods: OCP (estrogen/progesterone)  Desires to: continue contraception  Last Pap : . Result: neg/GC/Chlamydia  Last Completed Pap Smear          Ordered - PAP SMEAR (Every 3 Years) Ordered on 2021  SCANNED - PAP SMEAR    2020  Done - negative.    2016  Patient-Reported (Performed Externally)              History of abnormal Pap smear: no  Family history of uterine, colon, breast, or ovarian cancer: yes - mother, MGM, PGM had breast cancer and PGM also had ovarian cancer  Performs monthly Self-Breast Exam: yes  Exercises Regularly:yes  Feelings of Anxiety or Depression: yes - taking 50mg Zoloft but would like a referral to a mental health specialist.   Tobacco Usage?: No   OB History        0    Para   0    Term   0       0    AB   0    Living   0       SAB   0    IAB   0    Ectopic   0    Molar   0    Multiple   0    Live Births   0                Health Maintenance   Topic Date Due   • HEPATITIS C SCREENING  Never done   • ANNUAL PHYSICAL  2019   • Annual Gynecologic  "Pelvic and Breast Exam  04/09/2022   • COVID-19 Vaccine (3 - Booster for Pfizer series) 05/19/2022   • INFLUENZA VACCINE  08/01/2022   • PAP SMEAR  04/08/2024   • TDAP/TD VACCINES (3 - Td or Tdap) 01/01/2026   • Pneumococcal Vaccine 0-64  Aged Out       The additional following portions of the patient's history were reviewed and updated as appropriate: allergies, current medications, past family history, past medical history, past social history, past surgical history and problem list.    Review of Systems   Constitutional: Negative.    HENT: Negative.    Respiratory: Negative.    Cardiovascular: Negative.    Gastrointestinal: Negative.    Genitourinary: Positive for menstrual problem.   Musculoskeletal: Negative.    Skin: Negative.    Allergic/Immunologic: Negative.    Neurological: Negative.    Hematological: Negative.    Psychiatric/Behavioral: Negative.  Positive for depressed mood.         I have reviewed and agree with the HPI, ROS, and historical information as entered above. Sheyla Morin MD    Objective   /70   Ht 177.8 cm (70\")   Wt 72.6 kg (160 lb)   LMP 04/07/2022 (Exact Date)   Breastfeeding No   BMI 22.96 kg/m²     Physical Exam  Vitals and nursing note reviewed. Exam conducted with a chaperone present.   Constitutional:       Appearance: She is well-developed.   HENT:      Head: Normocephalic and atraumatic.   Neck:      Thyroid: No thyroid mass or thyromegaly.   Cardiovascular:      Rate and Rhythm: Normal rate and regular rhythm.      Heart sounds: No murmur heard.  Pulmonary:      Effort: Pulmonary effort is normal. No retractions.      Breath sounds: Normal breath sounds. No wheezing, rhonchi or rales.   Chest:      Chest wall: No mass or tenderness.   Breasts:      Right: Normal. No mass, nipple discharge, skin change or tenderness.      Left: Normal. No mass, nipple discharge, skin change or tenderness.       Abdominal:      General: Bowel sounds are normal.      Palpations: " Abdomen is soft. Abdomen is not rigid. There is no mass.      Tenderness: There is no abdominal tenderness. There is no guarding.      Hernia: No hernia is present. There is no hernia in the left inguinal area or right inguinal area.   Genitourinary:     General: Normal vulva.      Exam position: Lithotomy position.      Pubic Area: No rash.       Labia:         Right: No rash, tenderness or lesion.         Left: No rash, tenderness or lesion.       Urethra: No urethral pain or urethral swelling.      Vagina: Normal. No vaginal discharge or lesions.      Cervix: No cervical motion tenderness, discharge, lesion or cervical bleeding.      Uterus: Normal. Not enlarged, not fixed and not tender.       Adnexa:         Right: No mass, tenderness or fullness.          Left: No mass, tenderness or fullness.        Rectum: No external hemorrhoid.      Comments: On menses  Musculoskeletal:      Cervical back: Normal range of motion. No muscular tenderness.   Neurological:      Mental Status: She is alert and oriented to person, place, and time.   Psychiatric:         Behavior: Behavior normal.            Assessment and Plan    Problem List Items Addressed This Visit        Mental Health    Depression    Overview     Started on Zoloft on 10/7/2020.  Patient requesting referral counseling.  Will refer to [facility].  Patient would like to continue Zoloft for now.  She reports she has been noticing increase in symptoms 2 days prior to her menstrual cycle.  We discussed that typically while on birth control, PMDD would be treated secondary to a stable hormone dosing.  We will try to switch her birth control to Seasonique in order to avoid having menstrual cycles for a few months at a time.  If this is not helping, patient to return to clinic.           Relevant Medications    sertraline (Zoloft) 50 MG tablet    Other Relevant Orders    Ambulatory Referral to Behavioral Health (Completed)       Other    Herpes,  vulvar    Overview     No outbreaks since age 20.  Does not take daily prophylaxis.             Other Visit Diagnoses     Women's annual routine gynecological examination    -  Primary    Relevant Orders    Pap IG, Ct-Ng, HPV-hr    Encounter for surveillance of contraceptive pills        Relevant Medications    Levonorgest-Eth Estrad 91-Day (Seasonique) 0.15-0.03 &0.01 MG tablet    Other Relevant Orders    Pap IG, Ct-Ng, HPV-hr          1. GYN annual well woman exam.   2. Encouraged use of condoms for STD prevention.  3. OCP's/Vaginal Ring - Discussed side effects of nausea, BTB, headaches, breast tenderness and slight weight gain in the first three cycles.  Understands risks of blood clots, stroke, and theoretical risk of breast cancer.  Denies family history of blood clots.  4. Reviewed monthly self breast exams.  Instructed to call with lumps, pain, or breast discharge.    5. Reviewed exercise as a preventative health measures.   6. Reccommended Flu Vaccine in Fall of each year.  7. RTC in 1 year or PRN with problems  Return in about 1 year (around 4/12/2023), or if symptoms worsen or fail to improve, for Annual physical.      Sheyla Morin MD  04/12/2022

## 2022-04-19 DIAGNOSIS — Z30.41 ENCOUNTER FOR SURVEILLANCE OF CONTRACEPTIVE PILLS: ICD-10-CM

## 2022-04-19 DIAGNOSIS — Z01.419 WOMEN'S ANNUAL ROUTINE GYNECOLOGICAL EXAMINATION: ICD-10-CM

## 2022-05-02 DIAGNOSIS — Z30.41 ENCOUNTER FOR SURVEILLANCE OF CONTRACEPTIVE PILLS: ICD-10-CM

## 2022-05-02 RX ORDER — NORGESTIMATE AND ETHINYL ESTRADIOL 0.25-0.035
KIT ORAL
Qty: 84 TABLET | Refills: 4 | OUTPATIENT
Start: 2022-05-02

## 2022-05-11 RX ORDER — NORGESTIMATE AND ETHINYL ESTRADIOL 0.25-0.035
1 KIT ORAL DAILY
Qty: 84 TABLET | Refills: 3 | Status: SHIPPED | OUTPATIENT
Start: 2022-05-11 | End: 2023-03-09 | Stop reason: SDUPTHER

## 2022-05-11 NOTE — TELEPHONE ENCOUNTER
Dr. Morin pt.     S/w pt she states she has not tried the seasonique that was sent in on 4/12/2022 and refuses to try. Patient states she would like to go back on sprintec if possible.     Last annual: 4/12/2022    I told patient I would confirm this with Dr. Morin and if approved we would go ahead and send in.     She v/u.

## 2022-11-08 ENCOUNTER — APPOINTMENT (OUTPATIENT)
Dept: CT IMAGING | Facility: HOSPITAL | Age: 27
End: 2022-11-08

## 2022-11-08 ENCOUNTER — HOSPITAL ENCOUNTER (EMERGENCY)
Facility: HOSPITAL | Age: 27
Discharge: HOME | End: 2022-11-08
Attending: EMERGENCY MEDICINE | Admitting: EMERGENCY MEDICINE

## 2022-11-08 VITALS
OXYGEN SATURATION: 96 % | HEART RATE: 80 BPM | BODY MASS INDEX: 23.7 KG/M2 | RESPIRATION RATE: 18 BRPM | WEIGHT: 160 LBS | TEMPERATURE: 97.8 F | SYSTOLIC BLOOD PRESSURE: 121 MMHG | HEIGHT: 69 IN | DIASTOLIC BLOOD PRESSURE: 90 MMHG

## 2022-11-08 DIAGNOSIS — K52.9 GASTROENTERITIS: ICD-10-CM

## 2022-11-08 DIAGNOSIS — N83.202 LEFT OVARIAN CYST: ICD-10-CM

## 2022-11-08 DIAGNOSIS — R11.2 NAUSEA AND VOMITING, UNSPECIFIED VOMITING TYPE: Primary | ICD-10-CM

## 2022-11-08 DIAGNOSIS — R10.9 ACUTE ABDOMINAL PAIN: ICD-10-CM

## 2022-11-08 LAB
ALBUMIN SERPL-MCNC: 4.2 G/DL (ref 3.5–5.2)
ALBUMIN/GLOB SERPL: 1.5 G/DL
ALP SERPL-CCNC: 67 U/L (ref 39–117)
ALT SERPL W P-5'-P-CCNC: 12 U/L (ref 1–33)
ANION GAP SERPL CALCULATED.3IONS-SCNC: 15 MMOL/L (ref 5–15)
AST SERPL-CCNC: 24 U/L (ref 1–32)
B-HCG UR QL: NEGATIVE
BASOPHILS # BLD AUTO: 0.03 10*3/MM3 (ref 0–0.2)
BASOPHILS NFR BLD AUTO: 0.3 % (ref 0–1.5)
BILIRUB SERPL-MCNC: 0.4 MG/DL (ref 0–1.2)
BILIRUB UR QL STRIP: NEGATIVE
BUN SERPL-MCNC: 7 MG/DL (ref 6–20)
BUN/CREAT SERPL: 9.5 (ref 7–25)
CALCIUM SPEC-SCNC: 9.2 MG/DL (ref 8.6–10.5)
CHLORIDE SERPL-SCNC: 101 MMOL/L (ref 98–107)
CLARITY UR: CLEAR
CO2 SERPL-SCNC: 19 MMOL/L (ref 22–29)
COLOR UR: YELLOW
CREAT SERPL-MCNC: 0.74 MG/DL (ref 0.57–1)
DEPRECATED RDW RBC AUTO: 43.8 FL (ref 37–54)
EGFRCR SERPLBLD CKD-EPI 2021: 113.9 ML/MIN/1.73
EOSINOPHIL # BLD AUTO: 0.05 10*3/MM3 (ref 0–0.4)
EOSINOPHIL NFR BLD AUTO: 0.5 % (ref 0.3–6.2)
ERYTHROCYTE [DISTWIDTH] IN BLOOD BY AUTOMATED COUNT: 13.1 % (ref 12.3–15.4)
EXPIRATION DATE: NORMAL
FLUAV RNA RESP QL NAA+PROBE: NOT DETECTED
FLUBV RNA RESP QL NAA+PROBE: NOT DETECTED
GLOBULIN UR ELPH-MCNC: 2.8 GM/DL
GLUCOSE SERPL-MCNC: 131 MG/DL (ref 65–99)
GLUCOSE UR STRIP-MCNC: NEGATIVE MG/DL
HCT VFR BLD AUTO: 39.3 % (ref 34–46.6)
HGB BLD-MCNC: 12.9 G/DL (ref 12–15.9)
HGB UR QL STRIP.AUTO: NEGATIVE
HOLD SPECIMEN: NORMAL
IMM GRANULOCYTES # BLD AUTO: 0.03 10*3/MM3 (ref 0–0.05)
IMM GRANULOCYTES NFR BLD AUTO: 0.3 % (ref 0–0.5)
INTERNAL NEGATIVE CONTROL: NORMAL
INTERNAL POSITIVE CONTROL: NORMAL
KETONES UR QL STRIP: NEGATIVE
LEUKOCYTE ESTERASE UR QL STRIP.AUTO: NEGATIVE
LIPASE SERPL-CCNC: 12 U/L (ref 13–60)
LYMPHOCYTES # BLD AUTO: 1.24 10*3/MM3 (ref 0.7–3.1)
LYMPHOCYTES NFR BLD AUTO: 12.7 % (ref 19.6–45.3)
Lab: NORMAL
MCH RBC QN AUTO: 30.2 PG (ref 26.6–33)
MCHC RBC AUTO-ENTMCNC: 32.8 G/DL (ref 31.5–35.7)
MCV RBC AUTO: 92 FL (ref 79–97)
MONOCYTES # BLD AUTO: 0.56 10*3/MM3 (ref 0.1–0.9)
MONOCYTES NFR BLD AUTO: 5.7 % (ref 5–12)
NEUTROPHILS NFR BLD AUTO: 7.84 10*3/MM3 (ref 1.7–7)
NEUTROPHILS NFR BLD AUTO: 80.5 % (ref 42.7–76)
NITRITE UR QL STRIP: NEGATIVE
NRBC BLD AUTO-RTO: 0 /100 WBC (ref 0–0.2)
PH UR STRIP.AUTO: 6 [PH] (ref 5–8)
PLATELET # BLD AUTO: 232 10*3/MM3 (ref 140–450)
PMV BLD AUTO: 9.8 FL (ref 6–12)
POTASSIUM SERPL-SCNC: 3.5 MMOL/L (ref 3.5–5.2)
PROT SERPL-MCNC: 7 G/DL (ref 6–8.5)
PROT UR QL STRIP: NEGATIVE
RBC # BLD AUTO: 4.27 10*6/MM3 (ref 3.77–5.28)
SARS-COV-2 RNA RESP QL NAA+PROBE: NOT DETECTED
SODIUM SERPL-SCNC: 135 MMOL/L (ref 136–145)
SP GR UR STRIP: 1.02 (ref 1–1.03)
UROBILINOGEN UR QL STRIP: NORMAL
WBC NRBC COR # BLD: 9.75 10*3/MM3 (ref 3.4–10.8)
WHOLE BLOOD HOLD COAG: NORMAL
WHOLE BLOOD HOLD SPECIMEN: NORMAL

## 2022-11-08 PROCEDURE — 99283 EMERGENCY DEPT VISIT LOW MDM: CPT

## 2022-11-08 PROCEDURE — 81003 URINALYSIS AUTO W/O SCOPE: CPT

## 2022-11-08 PROCEDURE — 85025 COMPLETE CBC W/AUTO DIFF WBC: CPT

## 2022-11-08 PROCEDURE — 74177 CT ABD & PELVIS W/CONTRAST: CPT

## 2022-11-08 PROCEDURE — 80053 COMPREHEN METABOLIC PANEL: CPT

## 2022-11-08 PROCEDURE — 25010000002 ONDANSETRON PER 1 MG: Performed by: NURSE PRACTITIONER

## 2022-11-08 PROCEDURE — 87636 SARSCOV2 & INF A&B AMP PRB: CPT | Performed by: NURSE PRACTITIONER

## 2022-11-08 PROCEDURE — 81025 URINE PREGNANCY TEST: CPT | Performed by: EMERGENCY MEDICINE

## 2022-11-08 PROCEDURE — 81025 URINE PREGNANCY TEST: CPT

## 2022-11-08 PROCEDURE — 96374 THER/PROPH/DIAG INJ IV PUSH: CPT

## 2022-11-08 PROCEDURE — 83690 ASSAY OF LIPASE: CPT

## 2022-11-08 PROCEDURE — 25010000002 IOPAMIDOL 61 % SOLUTION: Performed by: EMERGENCY MEDICINE

## 2022-11-08 RX ORDER — SODIUM CHLORIDE 9 MG/ML
10 INJECTION INTRAVENOUS AS NEEDED
Status: DISCONTINUED | OUTPATIENT
Start: 2022-11-08 | End: 2022-11-08 | Stop reason: HOSPADM

## 2022-11-08 RX ORDER — ONDANSETRON 4 MG/1
4 TABLET, ORALLY DISINTEGRATING ORAL EVERY 6 HOURS PRN
Qty: 12 TABLET | Refills: 0 | Status: SHIPPED | OUTPATIENT
Start: 2022-11-08 | End: 2022-12-07

## 2022-11-08 RX ORDER — DICYCLOMINE HCL 20 MG
20 TABLET ORAL EVERY 6 HOURS PRN
Qty: 12 TABLET | Refills: 0 | Status: SHIPPED | OUTPATIENT
Start: 2022-11-08 | End: 2022-12-07

## 2022-11-08 RX ORDER — ONDANSETRON 2 MG/ML
4 INJECTION INTRAMUSCULAR; INTRAVENOUS ONCE
Status: COMPLETED | OUTPATIENT
Start: 2022-11-08 | End: 2022-11-08

## 2022-11-08 RX ADMIN — ONDANSETRON 4 MG: 2 INJECTION INTRAMUSCULAR; INTRAVENOUS at 07:45

## 2022-11-08 RX ADMIN — SODIUM CHLORIDE 1000 ML: 9 INJECTION, SOLUTION INTRAVENOUS at 07:45

## 2022-11-08 RX ADMIN — IOPAMIDOL 85 ML: 612 INJECTION, SOLUTION INTRAVENOUS at 08:49

## 2022-11-08 NOTE — ED PROVIDER NOTES
Subjective   History of Present Illness  Diffuse abd pain with nv shortly after eating pizza yesterday. Has also been congested. No fever. No diarrhea.     Abdominal Pain  Pain location:  Generalized  Pain quality: cramping    Pain quality: not aching    Pain radiates to:  Does not radiate  Pain severity:  Moderate  Duration:  1 day  Timing:  Constant  Progression:  Waxing and waning  Context: eating    Relieved by:  Nothing  Worsened by:  Eating  Associated symptoms: nausea and vomiting    Associated symptoms: no chest pain, no chills, no constipation, no cough, no diarrhea, no dysuria, no fever, no hematuria, no shortness of breath and no sore throat        Review of Systems   Constitutional: Negative for chills, diaphoresis and fever.   HENT: Negative for congestion and sore throat.    Respiratory: Negative for cough, choking, chest tightness, shortness of breath and wheezing.    Cardiovascular: Negative for chest pain and leg swelling.   Gastrointestinal: Positive for abdominal pain, nausea and vomiting. Negative for abdominal distention, anal bleeding, blood in stool, constipation and diarrhea.   Genitourinary: Negative for difficulty urinating, dysuria, flank pain, frequency, hematuria and urgency.   All other systems reviewed and are negative.      Past Medical History:   Diagnosis Date   • Acne    • Allergic    • Depression    • Genital herpes    • Heart murmur     as a child   • Herpes genitalis    • Irregular menses    • Pap smear for cervical cancer screening 08/2016   • Vegan diet        No Known Allergies    Past Surgical History:   Procedure Laterality Date   • WISDOM TOOTH EXTRACTION      x4       Family History   Problem Relation Age of Onset   • Breast cancer Mother    • Arthritis Father    • Hypertension Father    • Arthritis Maternal Grandmother    • Breast cancer Maternal Grandmother    • Hypertension Maternal Grandmother    • Hypertension Maternal Grandfather    • Arthritis Paternal Grandmother     • Ovarian cancer Paternal Grandmother    • Breast cancer Paternal Grandmother    • Heart attack Paternal Grandmother    • Hypertension Paternal Grandmother    • Hypertension Paternal Grandfather    • Stroke Paternal Grandfather    • Kidney cancer Other        Social History     Socioeconomic History   • Marital status: Single   Tobacco Use   • Smoking status: Never   • Smokeless tobacco: Never   Substance and Sexual Activity   • Alcohol use: Yes     Alcohol/week: 0.0 - 7.0 standard drinks   • Drug use: No   • Sexual activity: Yes     Partners: Male     Birth control/protection: OCP           Objective   Physical Exam  Constitutional:       Appearance: She is well-developed.   HENT:      Head: Normocephalic and atraumatic.      Right Ear: External ear normal.      Left Ear: External ear normal.      Nose: Nose normal.   Eyes:      Conjunctiva/sclera: Conjunctivae normal.      Pupils: Pupils are equal, round, and reactive to light.   Cardiovascular:      Rate and Rhythm: Normal rate and regular rhythm.      Heart sounds: Normal heart sounds.   Pulmonary:      Effort: Pulmonary effort is normal.      Breath sounds: Normal breath sounds.   Abdominal:      General: Bowel sounds are normal.      Palpations: Abdomen is soft.      Tenderness: There is abdominal tenderness.   Musculoskeletal:         General: Normal range of motion.      Cervical back: Normal range of motion and neck supple.   Skin:     General: Skin is warm and dry.   Neurological:      Mental Status: She is alert and oriented to person, place, and time.   Psychiatric:         Behavior: Behavior normal.         Thought Content: Thought content normal.         Judgment: Judgment normal.         Procedures           ED Course  ED Course as of 11/08/22 1024   Tue Nov 08, 2022   1017 Patient resting comfortably.  Ready to go home.  I had a very long conversation with her and her father at the bedside.  And previously also spoke to her mother.  I did make her  and her father aware of the CAT scan findings as well as appropriate follow-up and signs symptoms worse condition.  She is thankful and agreeable [JM]   1018 .  She has no pain in her left lower quadrant with deep palpation.  She also has no pain in her right lower quadrant as well.  I did advise her of the findings of the ovarian cyst.  She told me last week she had some pain in that area but it went away. [JM]   1018 She follows Dr. Chaney and she can touch base with her about further evaluation.  She will return at once for worsening abdominal pain fever chills etc.  All thankful and agreeable. [JM]      ED Course User Index  [JM] Rolando Clay APRN           CT Abdomen Pelvis With Contrast   Final Result   1.  No evidence for acute abnormality throughout the abdomen or pelvis.   2.  A prominent left ovarian cyst is noted likely due to a dominant   physiologic ovarian cyst. Follow-up pelvic ultrasound in 6-8 weeks may   be considered to ensure resolution of the cyst.       This report was finalized on 11/8/2022 8:57 AM by Basilio Nelson MD.                                            [facility]    Final diagnoses:   Nausea and vomiting, unspecified vomiting type   Acute abdominal pain   Left ovarian cyst   Gastroenteritis       ED Disposition  ED Disposition     ED Disposition   Discharge    Condition   Stable    Comment   --             PATIENT CONNECTION - [place] [address]  [phone]  Schedule an appointment as soon as possible for a visit       Sheyla Morin MD  [address] [facility] [address] [place] [address]  [phone]    Schedule an appointment as soon as possible for a visit   For further evaluation of your ovarian cyst in the next 6 to 8 weeks.         Medication List      New Prescriptions    dicyclomine 20 MG tablet  Commonly known as: BENTYL  Take 1 tablet by mouth Every 6 (Six) Hours As Needed (abdominal pain).     ondansetron ODT 4 MG disintegrating tablet  Commonly known  as: ZOFRAN-ODT  Place 1 tablet on the tongue Every 6 (Six) Hours As Needed for Nausea or Vomiting.           Where to Get Your Medications      These medications were sent to [facility]/pharmacy #[id number] - [place], [place] - [address] [place] [facility] - [phone]  - [phone] FX  [address] [place] [facility] [address]    Phone: [phone]   · dicyclomine 20 MG tablet  · ondansetron ODT 4 MG disintegrating tablet          Rolando Clay, APRFELIPE  11/08/22 1024

## 2022-12-05 DIAGNOSIS — N83.209 CYST OF OVARY, UNSPECIFIED LATERALITY: Primary | ICD-10-CM

## 2022-12-07 ENCOUNTER — OFFICE VISIT (OUTPATIENT)
Dept: OBSTETRICS AND GYNECOLOGY | Facility: CLINIC | Age: 27
End: 2022-12-07

## 2022-12-07 VITALS — SYSTOLIC BLOOD PRESSURE: 120 MMHG | BODY MASS INDEX: 22.74 KG/M2 | WEIGHT: 154 LBS | DIASTOLIC BLOOD PRESSURE: 80 MMHG

## 2022-12-07 DIAGNOSIS — N83.202 LEFT OVARIAN CYST: Primary | ICD-10-CM

## 2022-12-07 PROCEDURE — 99213 OFFICE O/P EST LOW 20 MIN: CPT | Performed by: OBSTETRICS & GYNECOLOGY

## 2022-12-07 RX ORDER — SERTRALINE HYDROCHLORIDE 100 MG/1
100 TABLET, FILM COATED ORAL DAILY
COMMUNITY
Start: 2022-11-07

## 2022-12-07 NOTE — PROGRESS NOTES
Chief Complaint   Patient presents with   • Ovarian Cyst         Subjective   HPI  Melani Bhatt is a 27 y.o. female, , who presents for evaluation of ovarian cyst that was found via CT scan in the ER aprx 5 weeks ago.      She states she has acute pelvic pain.  She states she has experienced this problem for 2-3 months.  She describes the severity as intermittent.  She rates her pain score as a 3/10.  She states that the problem is intermittent.  She states the pain is located in the left lower quadrant and it does radiate.  Patient notes aggravating factors include menses and alleviating factors are heating pad and Advil. The ER gave her bentyl which she reports helped a lot.  The patient reports additional symptoms as spotting last month after going to the ER. She had her period after that and no bleeding since.  The patient has previously been evaluated for ovarian cyst. Her first diagnosis of ovarian cyst was 22 at  ER.  Previously the size was indicated to be 3.7cm. . .    US done today: Yes.  Findings showed Small simple left ovarian cyst measuring 26 x 21 x 29 mm.  Otherwise normal pelvic ultrasound..  I have personally evaluated the U/S and agree with the findings. Sheyla Morin MD      Her last LMP was Patient's last menstrual period was 2022..  Periods are regular every 28-30 days, lasting 5 days.  Dysmenorrhea:mild to moderate.  Partner Status: Marital Status: single.  New Partners since last visit: yes.  Desires STD Screening: no.    Additional OB/GYN History   Last Pap :   Last Completed Pap Smear          Ordered - PAP SMEAR (Every 3 Years) Ordered on 2022  SCANNED - PAP SMEAR    2021  SCANNED - PAP SMEAR    2020  Done - negative.    2016  Patient-Reported (Performed Externally)                Tobacco Usage?: No       Current Outpatient Medications:   •  norgestimate-ethinyl estradiol (ORTHO-CYCLEN) 0.25-35 MG-MCG per tablet,  Take 1 tablet by mouth Daily., Disp: 84 tablet, Rfl: 3  •  sertraline (ZOLOFT) 100 MG tablet, Take 100 mg by mouth Daily., Disp: , Rfl:      Past Medical History:   Diagnosis Date   • Acne    • Allergic    • Depression    • Genital herpes    • Heart murmur     as a child   • Herpes genitalis    • Irregular menses    • Pap smear for cervical cancer screening 08/2016   • Vegan diet         Past Surgical History:   Procedure Laterality Date   • WISDOM TOOTH EXTRACTION      x4       The additional following portions of the patient's history were reviewed and updated as appropriate: allergies, current medications, past family history, past medical history, past social history, past surgical history and problem list.    Review of Systems   Constitutional: Negative.    HENT: Negative.    Eyes: Negative.    Respiratory: Negative.    Cardiovascular: Negative.    Gastrointestinal: Negative.    Endocrine: Negative.    Genitourinary: Positive for pelvic pain.   Musculoskeletal: Negative.    Skin: Negative.    Allergic/Immunologic: Negative.    Neurological: Negative.    Hematological: Negative.    Psychiatric/Behavioral: Negative.      All other systems reviewed and are negative.     I have reviewed and agree with the HPI, ROS, and historical information as entered above. Sheyla Morin MD    /80   Wt 69.9 kg (154 lb)   LMP 11/14/2022   BMI 22.74 kg/m²     Physical Exam  Vitals and nursing note reviewed.   Constitutional:       Appearance: Normal appearance. She is well-developed.   HENT:      Head: Normocephalic and atraumatic.   Pulmonary:      Effort: Pulmonary effort is normal.      Breath sounds: Normal breath sounds.   Abdominal:      General: There is no distension.      Palpations: Abdomen is soft. Abdomen is not rigid. There is no mass.      Tenderness: There is no abdominal tenderness. There is no guarding or rebound.   Musculoskeletal:      Cervical back: Normal range of motion.   Skin:     General:  Skin is warm and dry.   Neurological:      Mental Status: She is alert and oriented to person, place, and time.   Psychiatric:         Mood and Affect: Mood normal.         Behavior: Behavior normal.         Assessment & Plan     Assessment and Plan    Problem List Items Addressed This Visit        Genitourinary and Reproductive     Left ovarian cyst - Primary    Overview     11/8/2022-CT in the ER showed left ovarian cyst measuring 3.7 cm.  On 12/7/2022-Small simple left ovarian cyst measuring 26 x 21 x 29 mm.  Otherwise normal pelvic ultrasound.  Ovarian cyst decreasing in size.  Offered patient ultrasound in 6 weeks to ensure resolution and she declined.        We discussed nausea associated with her cycles.  Patient reports that she went to the ER because she had significant nausea and vomiting for 4 hours.  She reports she started nausea once again just recently and this is related to right before her cycle.  We discussed that she is on a hormone that should have a steady state and therefore not have a hormone change.  We discussed switching from an oral contraceptive to a NuvaRing to see if it would help with the nausea.  Patient not interested at this time.    1. Return for Annual physical.    25 minutes spent in patient care.  Greater than 50% was face-to-face.  Sheyla Morin MD  12/07/2022

## 2022-12-28 NOTE — TELEPHONE ENCOUNTER
----- Message from Ananya Vanessa sent at 12/28/2022 11:11 AM CST -----  .Type:  Procedure Reschedule/COVID Exposure      Who Called: pt  Would the patient rather a call back or a response via [facility]? call  Best Call Back Number: [phone]  Additional Information:     Pt has been exposed to COVID and will need to reschedule   Pt has been off of her blood thinners for 3 days and would like to know if she can begin them again          LMTC to discuss

## 2023-03-09 ENCOUNTER — TELEPHONE (OUTPATIENT)
Dept: OBSTETRICS AND GYNECOLOGY | Facility: CLINIC | Age: 28
End: 2023-03-09
Payer: MEDICAID

## 2023-03-09 RX ORDER — NORGESTIMATE AND ETHINYL ESTRADIOL 0.25-0.035
1 KIT ORAL DAILY
Qty: 84 TABLET | Refills: 0 | Status: SHIPPED | OUTPATIENT
Start: 2023-03-09

## 2023-03-09 NOTE — TELEPHONE ENCOUNTER
DREA patient.     Annual appointment 4/17.     [facility]- notified patient that I sent in her birth control to get her through her appointment. Notified patient she needed to keep appointment in order to keep getting refills.

## 2023-03-23 ENCOUNTER — TELEPHONE (OUTPATIENT)
Dept: OBSTETRICS AND GYNECOLOGY | Facility: CLINIC | Age: 28
End: 2023-03-23
Payer: MEDICAID

## 2023-03-23 NOTE — TELEPHONE ENCOUNTER
Please refer to call on 3/9/23. Patient states the pharmacy said they haven't received medication. Can we please resend. Thank you     Pharmacy was verified.

## 2023-03-23 NOTE — TELEPHONE ENCOUNTER
Her annual apt is 4/17/2023 with Dr. Morin. The rx was sent 3/9/2023 and it was too soon to fill. The pharm said she picked it up 1/17/2023. I called the pt and she said she must have misplaced a pack. I have asked her to call the pharmacy and ask for vacation override or get cash pay cost. She will call back if she needs more assistance.

## 2023-04-17 ENCOUNTER — OFFICE VISIT (OUTPATIENT)
Dept: OBSTETRICS AND GYNECOLOGY | Facility: CLINIC | Age: 28
End: 2023-04-17
Payer: MEDICAID

## 2023-04-17 VITALS
HEIGHT: 69 IN | WEIGHT: 166.6 LBS | SYSTOLIC BLOOD PRESSURE: 126 MMHG | BODY MASS INDEX: 24.68 KG/M2 | DIASTOLIC BLOOD PRESSURE: 80 MMHG

## 2023-04-17 DIAGNOSIS — Z01.419 WOMEN'S ANNUAL ROUTINE GYNECOLOGICAL EXAMINATION: Primary | ICD-10-CM

## 2023-04-17 DIAGNOSIS — A60.04 HERPES, VULVAR: ICD-10-CM

## 2023-04-17 DIAGNOSIS — N89.8 VAGINAL ITCHING: ICD-10-CM

## 2023-04-17 RX ORDER — CLOBETASOL PROPIONATE 0.5 MG/G
1 CREAM TOPICAL 2 TIMES DAILY
Qty: 30 G | Refills: 0 | Status: SHIPPED | OUTPATIENT
Start: 2023-04-17

## 2023-04-17 RX ORDER — NORGESTIMATE AND ETHINYL ESTRADIOL 0.25-0.035
1 KIT ORAL DAILY
Qty: 84 TABLET | Refills: 3 | Status: SHIPPED | OUTPATIENT
Start: 2023-04-17

## 2023-04-17 NOTE — PROGRESS NOTES
Gynecologic Annual Exam Note        Annual Exam and Vaginal Itching        Subjective     HPI  Melani Bhatt is a 28 y.o.  female who presents for annual well woman exam as a established patient. There were no changes to her medical or surgical history since her last visit.. Patient reports problems with: vaginal itching that has been present for about 2-3 months. Patient states she has tried OTC monistat 7 day and vagisil that has not helped at all.  Patient's last menstrual period was 2023 (exact date).. Her periods occur every 25-35 days , lasting 4 days. The flow is light-moderate.. She reports dysmenorrhea is moderate, occurring premenstrually and first 1-2 days of flow. Partner Status: Marital Status: single.  She is not currently sexually active. STD testing recommendations have been explained to the patient and she does desire STD testing.    Additional OB/GYN History   Current contraception: contraceptive methods: OCP (estrogen/progesterone)  Desires to: continue contraception  Thromboembolic Disease: none  Age of menarche: 11    History of STD: yes HSV    Last Pap : 2022. Results: negative. HPV: negative. STD testing: negative  Last Completed Pap Smear          Ordered - PAP SMEAR (Every 3 Years) Ordered on 2022  SCANNED - PAP SMEAR    2021  SCANNED - PAP SMEAR    2020  Done - negative.    2016  Patient-Reported (Performed Externally)                 History of abnormal Pap smear: no  Gardasil status:completed  Family history of uterine, colon, breast, or ovarian cancer: yes - Mother, PGM and MGM had breast cancer and PGM had ovarian cancer  Performs monthly Self-Breast Exam: yes  Exercises Regularly:yes  Feelings of Anxiety or Depression: yes - both  Tobacco Usage?: No       Current Outpatient Medications:   •  norgestimate-ethinyl estradiol (ORTHO-CYCLEN) 0.25-35 MG-MCG per tablet, Take 1 tablet by mouth Daily., Disp: 84 tablet, Rfl: 3  •   sertraline (ZOLOFT) 50 MG tablet, Take 1 tablet by mouth Daily., Disp: , Rfl:   •  clobetasol (TEMOVATE) 0.05 % cream, Apply 1 application topically to the appropriate area as directed 2 (Two) Times a Day., Disp: 30 g, Rfl: 0     Patient is requesting refills of Ortho-cyclen.    OB History        0    Para   0    Term   0       0    AB   0    Living   0       SAB   0    IAB   0    Ectopic   0    Molar   0    Multiple   0    Live Births   0                Health Maintenance   Topic Date Due   • HEPATITIS C SCREENING  Never done   • ANNUAL PHYSICAL  2019   • Annual Gynecologic Pelvic and Breast Exam  2023   • INFLUENZA VACCINE  2023   • PAP SMEAR  2025   • TDAP/TD VACCINES (3 - Td or Tdap) 2026   • COVID-19 Vaccine  Completed   • Pneumococcal Vaccine 0-64  Aged Out       Past Medical History:   Diagnosis Date   • Acne    • Allergic    • Depression    • Genital herpes    • Heart murmur     as a child   • Herpes genitalis    • Irregular menses    • Pap smear for cervical cancer screening 2016   • Vegan diet         Past Surgical History:   Procedure Laterality Date   • WISDOM TOOTH EXTRACTION      x4       The additional following portions of the patient's history were reviewed and updated as appropriate: allergies, current medications, past family history, past medical history, past social history, past surgical history and problem list.    Review of Systems   Constitutional: Negative.    HENT: Negative.    Eyes: Negative.    Respiratory: Negative.    Cardiovascular: Negative.    Gastrointestinal: Negative.    Endocrine: Negative.    Genitourinary:        Genital itching   Musculoskeletal: Negative.    Skin: Negative.    Allergic/Immunologic: Negative.    Neurological: Negative.    Hematological: Negative.    Psychiatric/Behavioral: Negative.          I have reviewed and agree with the HPI, ROS, and historical information as entered above. Sheyla Morin MD       "  Objective   /80   Ht 175.3 cm (69.02\")   Wt 75.6 kg (166 lb 9.6 oz)   LMP 04/17/2023 (Exact Date)   Breastfeeding No   BMI 24.59 kg/m²     Physical Exam  Vitals and nursing note reviewed. Exam conducted with a chaperone present.   Constitutional:       Appearance: She is well-developed.   HENT:      Head: Normocephalic and atraumatic.   Neck:      Thyroid: No thyroid mass or thyromegaly.   Cardiovascular:      Rate and Rhythm: Normal rate and regular rhythm.      Heart sounds: No murmur heard.  Pulmonary:      Effort: Pulmonary effort is normal. No retractions.      Breath sounds: Normal breath sounds. No wheezing, rhonchi or rales.   Chest:      Chest wall: No mass or tenderness.   Breasts:     Right: Normal. No mass, nipple discharge, skin change or tenderness.      Left: Normal. No mass, nipple discharge, skin change or tenderness.   Abdominal:      General: Bowel sounds are normal.      Palpations: Abdomen is soft. Abdomen is not rigid. There is no mass.      Tenderness: There is no abdominal tenderness. There is no guarding.      Hernia: No hernia is present. There is no hernia in the left inguinal area or right inguinal area.   Genitourinary:     General: Normal vulva.      Exam position: Lithotomy position.      Pubic Area: No rash.       Labia:         Right: No rash, tenderness or lesion.         Left: No rash, tenderness or lesion.       Urethra: No urethral pain or urethral swelling.      Vagina: Normal. No vaginal discharge or lesions.      Cervix: No cervical motion tenderness, discharge, lesion or cervical bleeding.      Uterus: Normal. Not enlarged, not fixed and not tender.       Adnexa:         Right: No mass, tenderness or fullness.          Left: No mass, tenderness or fullness.        Rectum: No external hemorrhoid.      Comments: On menses.  Excoriation marks noted in the labial folds long with around the clitoris.  Musculoskeletal:      Cervical back: Normal range of motion. No " muscular tenderness.   Neurological:      Mental Status: She is alert and oriented to person, place, and time.   Psychiatric:         Behavior: Behavior normal.            Assessment and Plan    Problem List Items Addressed This Visit        Other    Herpes, vulvar    Overview     No outbreaks since age 20.  Does not take daily prophylaxis.        Other Visit Diagnoses     Women's annual routine gynecological examination    -  Primary    Vaginal itching        Relevant Medications    clobetasol (TEMOVATE) 0.05 % cream    Other Relevant Orders    NuSwab VG+, Candida 6sp          1. GYN annual well woman exam.   2. Reviewed pap guidelines.  3. We will treat with clobetasol for vaginal itching.  Sent new swab off for evaluation.  Discussed using cold packs or cold washcloths to help with the itching.  Patient to return to clinic if not improved.  4. Encouraged use of condoms for STD prevention.  5. Recommended use of Vitamin D replacement and getting adequate calcium in her diet. (1500mg)  6. Reviewed monthly self breast exams.  Instructed to call with lumps, pain, or breast discharge.    7. Reviewed exercise as a preventative health measures.   8. Reccommended Flu Vaccine in Fall of each year.  9. RTC in 1 year or PRN with problems  Return in about 1 year (around 4/17/2024), or if symptoms worsen or fail to improve, for Annual physical.      Sheyla Morin MD  04/17/2023

## 2023-04-19 LAB
A VAGINAE DNA VAG QL NAA+PROBE: NORMAL SCORE
BVAB2 DNA VAG QL NAA+PROBE: NORMAL SCORE
C ALBICANS DNA VAG QL NAA+PROBE: NEGATIVE
C GLABRATA DNA VAG QL NAA+PROBE: NEGATIVE
C KRUSEI DNA VAG QL NAA+PROBE: NEGATIVE
C LUSITANIAE DNA VAG QL NAA+PROBE: NEGATIVE
C TRACH DNA VAG QL NAA+PROBE: NEGATIVE
CANDIDA DNA VAG QL NAA+PROBE: NEGATIVE
MEGA1 DNA VAG QL NAA+PROBE: NORMAL SCORE
N GONORRHOEA DNA VAG QL NAA+PROBE: NEGATIVE
T VAGINALIS DNA VAG QL NAA+PROBE: NEGATIVE

## 2024-05-13 RX ORDER — NORGESTIMATE AND ETHINYL ESTRADIOL 0.25-0.035
1 KIT ORAL DAILY
Qty: 84 TABLET | Refills: 3 | OUTPATIENT
Start: 2024-05-13

## 2024-05-23 ENCOUNTER — TELEPHONE (OUTPATIENT)
Dept: OBSTETRICS AND GYNECOLOGY | Facility: CLINIC | Age: 29
End: 2024-05-23
Payer: MEDICAID

## 2024-05-23 DIAGNOSIS — Z30.41 ENCOUNTER FOR SURVEILLANCE OF CONTRACEPTIVE PILLS: Primary | ICD-10-CM

## 2024-05-23 RX ORDER — NORGESTIMATE AND ETHINYL ESTRADIOL 0.25-0.035
1 KIT ORAL DAILY
Qty: 28 TABLET | Refills: 0 | Status: SHIPPED | OUTPATIENT
Start: 2024-05-23

## 2024-05-23 NOTE — TELEPHONE ENCOUNTER
Patient of Dr. Morin; LOV 04/17/2023. Next visit is scheduled for 06/17/24.   Returned patient's call. Informed her that a refill for one month supply of OCPs has been sent to her pharmacy. She v/u and agreed.

## 2024-05-23 NOTE — TELEPHONE ENCOUNTER
norgestimate-ethinyl estradiol (ORTHO-CYCLEN) 0.25-35 MG-MCG per tablet (04/17/2023)     PT is requesting a 1 mo supply prior to next scheduled appt.

## 2024-06-10 ENCOUNTER — APPOINTMENT (OUTPATIENT)
Dept: GENERAL RADIOLOGY | Facility: HOSPITAL | Age: 29
End: 2024-06-10
Payer: OTHER MISCELLANEOUS

## 2024-06-10 ENCOUNTER — HOSPITAL ENCOUNTER (INPATIENT)
Facility: HOSPITAL | Age: 29
LOS: 2 days | Discharge: HOME | End: 2024-06-12
Attending: EMERGENCY MEDICINE | Admitting: INTERNAL MEDICINE
Payer: MEDICAID

## 2024-06-10 ENCOUNTER — APPOINTMENT (OUTPATIENT)
Dept: NEUROLOGY | Facility: HOSPITAL | Age: 29
End: 2024-06-10
Payer: OTHER MISCELLANEOUS

## 2024-06-10 ENCOUNTER — APPOINTMENT (OUTPATIENT)
Dept: CT IMAGING | Facility: HOSPITAL | Age: 29
End: 2024-06-10
Payer: OTHER MISCELLANEOUS

## 2024-06-10 DIAGNOSIS — R41.0 DELIRIUM: ICD-10-CM

## 2024-06-10 DIAGNOSIS — R56.9 NEW ONSET SEIZURE: Primary | ICD-10-CM

## 2024-06-10 LAB
ALBUMIN SERPL-MCNC: 3.5 G/DL (ref 3.5–5.2)
ALBUMIN/GLOB SERPL: 1.3 G/DL
ALP SERPL-CCNC: 64 U/L (ref 39–117)
ALT SERPL W P-5'-P-CCNC: 18 U/L (ref 1–33)
AMPHET+METHAMPHET UR QL: NEGATIVE
AMPHETAMINES UR QL: NEGATIVE
ANION GAP SERPL CALCULATED.3IONS-SCNC: 13 MMOL/L (ref 5–15)
APPEARANCE CSF: CLEAR
AST SERPL-CCNC: 25 U/L (ref 1–32)
B-HCG UR QL: NEGATIVE
BACTERIA UR QL AUTO: NORMAL /HPF
BARBITURATES UR QL SCN: NEGATIVE
BASOPHILS # BLD AUTO: 0.02 10*3/MM3 (ref 0–0.2)
BASOPHILS NFR BLD AUTO: 0.2 % (ref 0–1.5)
BENZODIAZ UR QL SCN: POSITIVE
BILIRUB SERPL-MCNC: 0.2 MG/DL (ref 0–1.2)
BILIRUB UR QL STRIP: NEGATIVE
BUN SERPL-MCNC: 8 MG/DL (ref 6–20)
BUN/CREAT SERPL: 10.8 (ref 7–25)
BUPRENORPHINE SERPL-MCNC: NEGATIVE NG/ML
C GATTII+NEOFOR DNA CSF QL NAA+NON-PROBE: NOT DETECTED
CALCIUM SPEC-SCNC: 8.1 MG/DL (ref 8.6–10.5)
CANNABINOIDS SERPL QL: POSITIVE
CHLORIDE SERPL-SCNC: 108 MMOL/L (ref 98–107)
CLARITY UR: CLEAR
CMV DNA CSF QL NAA+PROBE: NOT DETECTED
CO2 SERPL-SCNC: 18 MMOL/L (ref 22–29)
COCAINE UR QL: NEGATIVE
COLOR CSF: COLORLESS
COLOR UR: YELLOW
CREAT SERPL-MCNC: 0.74 MG/DL (ref 0.57–1)
DEPRECATED RDW RBC AUTO: 45.1 FL (ref 37–54)
E COLI K1 DNA CSF QL NAA+NON-PROBE: NOT DETECTED
EGFRCR SERPLBLD CKD-EPI 2021: 112.5 ML/MIN/1.73
EOSINOPHIL # BLD AUTO: 0.02 10*3/MM3 (ref 0–0.4)
EOSINOPHIL NFR BLD AUTO: 0.2 % (ref 0.3–6.2)
ERYTHROCYTE [DISTWIDTH] IN BLOOD BY AUTOMATED COUNT: 13.8 % (ref 12.3–15.4)
ETHANOL BLD-MCNC: <10 MG/DL (ref 0–10)
EV RNA CSF QL NAA+PROBE: NOT DETECTED
FENTANYL UR-MCNC: NEGATIVE NG/ML
GLOBULIN UR ELPH-MCNC: 2.7 GM/DL
GLUCOSE BLDC GLUCOMTR-MCNC: 106 MG/DL (ref 70–130)
GLUCOSE BLDC GLUCOMTR-MCNC: 110 MG/DL (ref 70–130)
GLUCOSE BLDC GLUCOMTR-MCNC: 121 MG/DL (ref 70–130)
GLUCOSE BLDC GLUCOMTR-MCNC: 130 MG/DL (ref 70–130)
GLUCOSE CSF-MCNC: 65 MG/DL (ref 40–70)
GLUCOSE SERPL-MCNC: 109 MG/DL (ref 65–99)
GLUCOSE UR STRIP-MCNC: NEGATIVE MG/DL
GP B STREP DNA SPEC QL NAA+PROBE: NOT DETECTED
HAEM INFLU SEROTYP DNA SPEC NAA+PROBE: NOT DETECTED
HCT VFR BLD AUTO: 37.2 % (ref 34–46.6)
HGB BLD-MCNC: 11.7 G/DL (ref 12–15.9)
HGB UR QL STRIP.AUTO: ABNORMAL
HHV6 DNA CSF QL NAA+PROBE: NOT DETECTED
HOLD SPECIMEN: NORMAL
HSV1 DNA CSF QL NAA+PROBE: NOT DETECTED
HSV2 DNA CSF QL NAA+PROBE: NOT DETECTED
HYALINE CASTS UR QL AUTO: NORMAL /LPF
IMM GRANULOCYTES # BLD AUTO: 0.06 10*3/MM3 (ref 0–0.05)
IMM GRANULOCYTES NFR BLD AUTO: 0.5 % (ref 0–0.5)
KETONES UR QL STRIP: ABNORMAL
L MONOCYTOG RRNA SPEC QL PROBE: NOT DETECTED
LEUKOCYTE ESTERASE UR QL STRIP.AUTO: NEGATIVE
LYMPHOCYTES # BLD AUTO: 0.86 10*3/MM3 (ref 0.7–3.1)
LYMPHOCYTES NFR BLD AUTO: 7.1 % (ref 19.6–45.3)
MCH RBC QN AUTO: 28.2 PG (ref 26.6–33)
MCHC RBC AUTO-ENTMCNC: 31.5 G/DL (ref 31.5–35.7)
MCV RBC AUTO: 89.6 FL (ref 79–97)
METHADONE UR QL SCN: NEGATIVE
MONOCYTES # BLD AUTO: 0.5 10*3/MM3 (ref 0.1–0.9)
MONOCYTES NFR BLD AUTO: 4.1 % (ref 5–12)
MRSA DNA SPEC QL NAA+PROBE: NEGATIVE
N MEN DNA SPEC QL NAA+PROBE: NOT DETECTED
NEUTROPHILS NFR BLD AUTO: 10.67 10*3/MM3 (ref 1.7–7)
NEUTROPHILS NFR BLD AUTO: 87.9 % (ref 42.7–76)
NITRITE UR QL STRIP: NEGATIVE
NRBC BLD AUTO-RTO: 0 /100 WBC (ref 0–0.2)
OPIATES UR QL: NEGATIVE
OXYCODONE UR QL SCN: NEGATIVE
PARECHOVIRUS A RNA CSF QL NAA+NON-PROBE: NOT DETECTED
PCP UR QL SCN: NEGATIVE
PH UR STRIP.AUTO: 5.5 [PH] (ref 5–8)
PLATELET # BLD AUTO: 228 10*3/MM3 (ref 140–450)
PMV BLD AUTO: 10.3 FL (ref 6–12)
POTASSIUM SERPL-SCNC: 3.9 MMOL/L (ref 3.5–5.2)
PROT CSF-MCNC: 31.4 MG/DL (ref 15–45)
PROT SERPL-MCNC: 6.2 G/DL (ref 6–8.5)
PROT UR QL STRIP: ABNORMAL
RBC # BLD AUTO: 4.15 10*6/MM3 (ref 3.77–5.28)
RBC # CSF MANUAL: 2 /MM3 (ref 0–5)
RBC # UR STRIP: NORMAL /HPF
REF LAB TEST METHOD: NORMAL
S PNEUM DNA CSF QL NAA+NON-PROBE: NOT DETECTED
SODIUM SERPL-SCNC: 139 MMOL/L (ref 136–145)
SP GR UR STRIP: 1.02 (ref 1–1.03)
SPECIMEN VOL CSF: 3.5 ML
SQUAMOUS #/AREA URNS HPF: NORMAL /HPF
TRICYCLICS UR QL SCN: NEGATIVE
TUBE # CSF: 4
UROBILINOGEN UR QL STRIP: ABNORMAL
VZV DNA CSF QL NAA+PROBE: NOT DETECTED
WBC # CSF MANUAL: 2 /MM3 (ref 0–5)
WBC # UR STRIP: NORMAL /HPF
WBC NRBC COR # BLD AUTO: 12.13 10*3/MM3 (ref 3.4–10.8)

## 2024-06-10 PROCEDURE — 25010000002 ACYCLOVIR PER 5 MG: Performed by: EMERGENCY MEDICINE

## 2024-06-10 PROCEDURE — 87798 DETECT AGENT NOS DNA AMP: CPT | Performed by: EMERGENCY MEDICINE

## 2024-06-10 PROCEDURE — 25010000002 LORAZEPAM PER 2 MG

## 2024-06-10 PROCEDURE — 25010000002 DEXAMETHASONE PER 1 MG: Performed by: EMERGENCY MEDICINE

## 2024-06-10 PROCEDURE — 009U3ZX DRAINAGE OF SPINAL CANAL, PERCUTANEOUS APPROACH, DIAGNOSTIC: ICD-10-PCS | Performed by: EMERGENCY MEDICINE

## 2024-06-10 PROCEDURE — 80053 COMPREHEN METABOLIC PANEL: CPT | Performed by: EMERGENCY MEDICINE

## 2024-06-10 PROCEDURE — 82945 GLUCOSE OTHER FLUID: CPT | Performed by: EMERGENCY MEDICINE

## 2024-06-10 PROCEDURE — 25010000002 CEFTRIAXONE PER 250 MG: Performed by: EMERGENCY MEDICINE

## 2024-06-10 PROCEDURE — 87468 ANAPLSMA PHGCYTOPHLM AMP PRB: CPT | Performed by: EMERGENCY MEDICINE

## 2024-06-10 PROCEDURE — 87070 CULTURE OTHR SPECIMN AEROBIC: CPT | Performed by: EMERGENCY MEDICINE

## 2024-06-10 PROCEDURE — 25810000003 SODIUM CHLORIDE 0.9 % SOLUTION 250 ML FLEX CONT: Performed by: EMERGENCY MEDICINE

## 2024-06-10 PROCEDURE — 87529 HSV DNA AMP PROBE: CPT | Performed by: EMERGENCY MEDICINE

## 2024-06-10 PROCEDURE — 25010000002 DIAZEPAM PER 5 MG: Performed by: EMERGENCY MEDICINE

## 2024-06-10 PROCEDURE — 87075 CULTR BACTERIA EXCEPT BLOOD: CPT | Performed by: EMERGENCY MEDICINE

## 2024-06-10 PROCEDURE — 80307 DRUG TEST PRSMV CHEM ANLYZR: CPT | Performed by: EMERGENCY MEDICINE

## 2024-06-10 PROCEDURE — 70450 CT HEAD/BRAIN W/O DYE: CPT

## 2024-06-10 PROCEDURE — 82948 REAGENT STRIP/BLOOD GLUCOSE: CPT

## 2024-06-10 PROCEDURE — 95819 EEG AWAKE AND ASLEEP: CPT

## 2024-06-10 PROCEDURE — 87205 SMEAR GRAM STAIN: CPT | Performed by: EMERGENCY MEDICINE

## 2024-06-10 PROCEDURE — 87641 MR-STAPH DNA AMP PROBE: CPT

## 2024-06-10 PROCEDURE — 99291 CRITICAL CARE FIRST HOUR: CPT

## 2024-06-10 PROCEDURE — 25010000002 LEVETRIRACETAM PER 10 MG: Performed by: EMERGENCY MEDICINE

## 2024-06-10 PROCEDURE — 25810000003 SODIUM CHLORIDE 0.9 % SOLUTION: Performed by: EMERGENCY MEDICINE

## 2024-06-10 PROCEDURE — 89050 BODY FLUID CELL COUNT: CPT | Performed by: EMERGENCY MEDICINE

## 2024-06-10 PROCEDURE — 82077 ASSAY SPEC XCP UR&BREATH IA: CPT | Performed by: EMERGENCY MEDICINE

## 2024-06-10 PROCEDURE — 87015 SPECIMEN INFECT AGNT CONCNTJ: CPT | Performed by: EMERGENCY MEDICINE

## 2024-06-10 PROCEDURE — 74018 RADEX ABDOMEN 1 VIEW: CPT

## 2024-06-10 PROCEDURE — 86618 LYME DISEASE ANTIBODY: CPT | Performed by: EMERGENCY MEDICINE

## 2024-06-10 PROCEDURE — 84157 ASSAY OF PROTEIN OTHER: CPT | Performed by: EMERGENCY MEDICINE

## 2024-06-10 PROCEDURE — 85025 COMPLETE CBC W/AUTO DIFF WBC: CPT | Performed by: EMERGENCY MEDICINE

## 2024-06-10 PROCEDURE — 87484 EHRLICHA CHAFFEENSIS AMP PRB: CPT | Performed by: EMERGENCY MEDICINE

## 2024-06-10 PROCEDURE — 87483 CNS DNA AMP PROBE TYPE 12-25: CPT | Performed by: EMERGENCY MEDICINE

## 2024-06-10 PROCEDURE — 25010000002 LORAZEPAM PER 2 MG: Performed by: EMERGENCY MEDICINE

## 2024-06-10 PROCEDURE — 71045 X-RAY EXAM CHEST 1 VIEW: CPT

## 2024-06-10 PROCEDURE — 25010000002 DIAZEPAM PER 5 MG

## 2024-06-10 PROCEDURE — 25010000002 VANCOMYCIN HCL IN NACL 1.5-0.9 GM/500ML-% SOLUTION: Performed by: EMERGENCY MEDICINE

## 2024-06-10 PROCEDURE — 25010000002 ZIPRASIDONE MESYLATE PER 10 MG: Performed by: EMERGENCY MEDICINE

## 2024-06-10 PROCEDURE — 95819 EEG AWAKE AND ASLEEP: CPT | Performed by: PSYCHIATRY & NEUROLOGY

## 2024-06-10 PROCEDURE — 25810000003 LACTATED RINGERS PER 1000 ML: Performed by: INTERNAL MEDICINE

## 2024-06-10 PROCEDURE — 81025 URINE PREGNANCY TEST: CPT | Performed by: EMERGENCY MEDICINE

## 2024-06-10 PROCEDURE — 99223 1ST HOSP IP/OBS HIGH 75: CPT | Performed by: INTERNAL MEDICINE

## 2024-06-10 PROCEDURE — 36415 COLL VENOUS BLD VENIPUNCTURE: CPT

## 2024-06-10 PROCEDURE — 25010000002 ENOXAPARIN PER 10 MG: Performed by: INTERNAL MEDICINE

## 2024-06-10 PROCEDURE — 81001 URINALYSIS AUTO W/SCOPE: CPT | Performed by: EMERGENCY MEDICINE

## 2024-06-10 RX ORDER — DEXAMETHASONE SODIUM PHOSPHATE 10 MG/ML
8 INJECTION INTRAMUSCULAR; INTRAVENOUS ONCE
Status: COMPLETED | OUTPATIENT
Start: 2024-06-10 | End: 2024-06-10

## 2024-06-10 RX ORDER — LEVETIRACETAM 500 MG/5ML
1000 INJECTION, SOLUTION, CONCENTRATE INTRAVENOUS ONCE
Status: COMPLETED | OUTPATIENT
Start: 2024-06-10 | End: 2024-06-10

## 2024-06-10 RX ORDER — SODIUM CHLORIDE 0.9 % (FLUSH) 0.9 %
10 SYRINGE (ML) INJECTION EVERY 12 HOURS SCHEDULED
Status: DISCONTINUED | OUTPATIENT
Start: 2024-06-10 | End: 2024-06-12 | Stop reason: HOSPADM

## 2024-06-10 RX ORDER — SODIUM CHLORIDE, SODIUM LACTATE, POTASSIUM CHLORIDE, CALCIUM CHLORIDE 600; 310; 30; 20 MG/100ML; MG/100ML; MG/100ML; MG/100ML
100 INJECTION, SOLUTION INTRAVENOUS CONTINUOUS
Status: ACTIVE | OUTPATIENT
Start: 2024-06-10 | End: 2024-06-11

## 2024-06-10 RX ORDER — SODIUM CHLORIDE 0.9 % (FLUSH) 0.9 %
10 SYRINGE (ML) INJECTION AS NEEDED
Status: DISCONTINUED | OUTPATIENT
Start: 2024-06-10 | End: 2024-06-12 | Stop reason: HOSPADM

## 2024-06-10 RX ORDER — LEVETIRACETAM 500 MG/5ML
1000 INJECTION, SOLUTION, CONCENTRATE INTRAVENOUS ONCE
Qty: 10 ML | Refills: 0 | Status: COMPLETED | OUTPATIENT
Start: 2024-06-10 | End: 2024-06-10

## 2024-06-10 RX ORDER — SODIUM CHLORIDE, SODIUM LACTATE, POTASSIUM CHLORIDE, CALCIUM CHLORIDE 600; 310; 30; 20 MG/100ML; MG/100ML; MG/100ML; MG/100ML
100 INJECTION, SOLUTION INTRAVENOUS CONTINUOUS
Status: DISCONTINUED | OUTPATIENT
Start: 2024-06-10 | End: 2024-06-10

## 2024-06-10 RX ORDER — DIAZEPAM 5 MG/ML
10 INJECTION, SOLUTION INTRAMUSCULAR; INTRAVENOUS ONCE
Status: COMPLETED | OUTPATIENT
Start: 2024-06-10 | End: 2024-06-10

## 2024-06-10 RX ORDER — VANCOMYCIN/0.9 % SOD CHLORIDE 1.5G/250ML
22 PLASTIC BAG, INJECTION (ML) INTRAVENOUS ONCE
Qty: 500 ML | Refills: 0 | Status: COMPLETED | OUTPATIENT
Start: 2024-06-10 | End: 2024-06-10

## 2024-06-10 RX ORDER — ZIPRASIDONE MESYLATE 20 MG/ML
20 INJECTION, POWDER, LYOPHILIZED, FOR SOLUTION INTRAMUSCULAR ONCE AS NEEDED
Status: COMPLETED | OUTPATIENT
Start: 2024-06-10 | End: 2024-06-10

## 2024-06-10 RX ORDER — POLYETHYLENE GLYCOL 3350 17 G/17G
17 POWDER, FOR SOLUTION ORAL DAILY PRN
Status: DISCONTINUED | OUTPATIENT
Start: 2024-06-10 | End: 2024-06-12 | Stop reason: HOSPADM

## 2024-06-10 RX ORDER — ZIPRASIDONE MESYLATE 20 MG/ML
20 INJECTION, POWDER, LYOPHILIZED, FOR SOLUTION INTRAMUSCULAR ONCE
Status: COMPLETED | OUTPATIENT
Start: 2024-06-10 | End: 2024-06-10

## 2024-06-10 RX ORDER — LORAZEPAM 2 MG/ML
1 INJECTION INTRAMUSCULAR ONCE
Status: COMPLETED | OUTPATIENT
Start: 2024-06-10 | End: 2024-06-10

## 2024-06-10 RX ORDER — DIAZEPAM 5 MG/ML
5 INJECTION, SOLUTION INTRAMUSCULAR; INTRAVENOUS ONCE
Status: DISCONTINUED | OUTPATIENT
Start: 2024-06-10 | End: 2024-06-10

## 2024-06-10 RX ORDER — LORAZEPAM 2 MG/ML
INJECTION INTRAMUSCULAR
Status: COMPLETED
Start: 2024-06-10 | End: 2024-06-10

## 2024-06-10 RX ORDER — DIAZEPAM 5 MG/ML
5 INJECTION, SOLUTION INTRAMUSCULAR; INTRAVENOUS ONCE
Status: COMPLETED | OUTPATIENT
Start: 2024-06-10 | End: 2024-06-10

## 2024-06-10 RX ORDER — NITROGLYCERIN 0.4 MG/1
0.4 TABLET SUBLINGUAL
Status: DISCONTINUED | OUTPATIENT
Start: 2024-06-10 | End: 2024-06-12 | Stop reason: HOSPADM

## 2024-06-10 RX ORDER — LORAZEPAM 2 MG/ML
2 INJECTION INTRAMUSCULAR ONCE
Status: COMPLETED | OUTPATIENT
Start: 2024-06-10 | End: 2024-06-10

## 2024-06-10 RX ORDER — SODIUM CHLORIDE 9 MG/ML
40 INJECTION, SOLUTION INTRAVENOUS AS NEEDED
Status: DISCONTINUED | OUTPATIENT
Start: 2024-06-10 | End: 2024-06-12 | Stop reason: HOSPADM

## 2024-06-10 RX ORDER — ENOXAPARIN SODIUM 100 MG/ML
40 INJECTION SUBCUTANEOUS NIGHTLY
Status: DISCONTINUED | OUTPATIENT
Start: 2024-06-10 | End: 2024-06-12 | Stop reason: HOSPADM

## 2024-06-10 RX ORDER — BISACODYL 10 MG
10 SUPPOSITORY, RECTAL RECTAL DAILY PRN
Status: DISCONTINUED | OUTPATIENT
Start: 2024-06-10 | End: 2024-06-12 | Stop reason: HOSPADM

## 2024-06-10 RX ORDER — BISACODYL 5 MG/1
5 TABLET, DELAYED RELEASE ORAL DAILY PRN
Status: DISCONTINUED | OUTPATIENT
Start: 2024-06-10 | End: 2024-06-12 | Stop reason: HOSPADM

## 2024-06-10 RX ORDER — AMOXICILLIN 250 MG
2 CAPSULE ORAL 2 TIMES DAILY
Status: DISCONTINUED | OUTPATIENT
Start: 2024-06-10 | End: 2024-06-12 | Stop reason: HOSPADM

## 2024-06-10 RX ORDER — DIAZEPAM 5 MG/ML
INJECTION, SOLUTION INTRAMUSCULAR; INTRAVENOUS
Status: COMPLETED
Start: 2024-06-10 | End: 2024-06-10

## 2024-06-10 RX ORDER — DEXMEDETOMIDINE HYDROCHLORIDE 4 UG/ML
.2-1.5 INJECTION, SOLUTION INTRAVENOUS
Status: DISCONTINUED | OUTPATIENT
Start: 2024-06-10 | End: 2024-06-10

## 2024-06-10 RX ORDER — DEXMEDETOMIDINE HYDROCHLORIDE 4 UG/ML
.2-1.5 INJECTION, SOLUTION INTRAVENOUS
Status: DISCONTINUED | OUTPATIENT
Start: 2024-06-10 | End: 2024-06-12 | Stop reason: HOSPADM

## 2024-06-10 RX ADMIN — ENOXAPARIN SODIUM 40 MG: 100 INJECTION SUBCUTANEOUS at 20:58

## 2024-06-10 RX ADMIN — ZIPRASIDONE MESYLATE 20 MG: 20 INJECTION, POWDER, LYOPHILIZED, FOR SOLUTION INTRAMUSCULAR at 13:50

## 2024-06-10 RX ADMIN — LEVETIRACETAM 1000 MG: 100 INJECTION, SOLUTION INTRAVENOUS at 12:32

## 2024-06-10 RX ADMIN — DIAZEPAM 10 MG: 10 INJECTION, SOLUTION INTRAMUSCULAR; INTRAVENOUS at 12:15

## 2024-06-10 RX ADMIN — SODIUM CHLORIDE 1000 ML: 9 INJECTION, SOLUTION INTRAVENOUS at 13:30

## 2024-06-10 RX ADMIN — LORAZEPAM 1 MG: 2 INJECTION INTRAMUSCULAR at 12:08

## 2024-06-10 RX ADMIN — LORAZEPAM 1 MG: 2 INJECTION INTRAMUSCULAR; INTRAVENOUS at 12:08

## 2024-06-10 RX ADMIN — Medication 1500 MG: at 17:35

## 2024-06-10 RX ADMIN — SODIUM CHLORIDE 2000 MG: 900 INJECTION INTRAVENOUS at 15:31

## 2024-06-10 RX ADMIN — LORAZEPAM 2 MG: 2 INJECTION INTRAMUSCULAR; INTRAVENOUS at 13:23

## 2024-06-10 RX ADMIN — LEVETIRACETAM 1000 MG: 100 INJECTION, SOLUTION INTRAVENOUS at 17:50

## 2024-06-10 RX ADMIN — ACYCLOVIR SODIUM 700 MG: 50 INJECTION, SOLUTION INTRAVENOUS at 16:29

## 2024-06-10 RX ADMIN — DEXAMETHASONE SODIUM PHOSPHATE 8 MG: 10 INJECTION INTRAMUSCULAR; INTRAVENOUS at 15:31

## 2024-06-10 RX ADMIN — SODIUM CHLORIDE, POTASSIUM CHLORIDE, SODIUM LACTATE AND CALCIUM CHLORIDE 100 ML/HR: 600; 310; 30; 20 INJECTION, SOLUTION INTRAVENOUS at 17:35

## 2024-06-10 RX ADMIN — SODIUM CHLORIDE 1000 ML: 9 INJECTION, SOLUTION INTRAVENOUS at 14:16

## 2024-06-10 RX ADMIN — DIAZEPAM 5 MG: 5 INJECTION, SOLUTION INTRAMUSCULAR; INTRAVENOUS at 12:12

## 2024-06-10 RX ADMIN — SODIUM CHLORIDE 1000 ML: 9 INJECTION, SOLUTION INTRAVENOUS at 12:33

## 2024-06-10 RX ADMIN — DEXMEDETOMIDINE HYDROCHLORIDE 0.2 MCG/KG/HR: 4 INJECTION, SOLUTION INTRAVENOUS at 13:54

## 2024-06-10 RX ADMIN — LEVETIRACETAM 1000 MG: 100 INJECTION, SOLUTION INTRAVENOUS at 12:43

## 2024-06-10 RX ADMIN — SODIUM CHLORIDE 1000 ML: 9 INJECTION, SOLUTION INTRAVENOUS at 13:46

## 2024-06-10 RX ADMIN — DIAZEPAM 10 MG: 5 INJECTION, SOLUTION INTRAMUSCULAR; INTRAVENOUS at 12:20

## 2024-06-10 RX ADMIN — DIAZEPAM 10 MG: 10 INJECTION, SOLUTION INTRAMUSCULAR; INTRAVENOUS at 12:20

## 2024-06-10 RX ADMIN — ZIPRASIDONE MESYLATE 10 MG: 20 INJECTION, POWDER, LYOPHILIZED, FOR SOLUTION INTRAMUSCULAR at 14:50

## 2024-06-10 RX ADMIN — Medication 10 ML: at 20:59

## 2024-06-10 RX ADMIN — DEXMEDETOMIDINE HYDROCHLORIDE 1 MCG/KG/HR: 4 INJECTION, SOLUTION INTRAVENOUS at 13:21

## 2024-06-10 NOTE — H&P
Intensive Care Admission Note     Chief Complaint:  Seizures    History of Present Illness     29-year-old female with a past medical history significant for genital herpes and depression.  Who brought to the [facility] ICU on 16 2024.  Patient started having generalized seizures this afternoon at home, while in the ER had a second seizure.  Between the initial 2 seizures she was returning back to baseline but after the second seizure continue to be extremely combative.  Patient was started on Precedex develop some hypotension.  Per the patient's family she was not sick or complaining of anything recently.  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  Although the parents are unaware if there has been any recent ticks.    Problem List, Surgical History, Family, Social History, and ROS     Past Medical History:   Diagnosis Date    Acne     Allergic     Depression     Genital herpes     Heart murmur     as a child    Herpes genitalis     Irregular menses     Pap smear for cervical cancer screening 08/2016    Vegan diet        Past Surgical History:   Procedure Laterality Date    WISDOM TOOTH EXTRACTION      x4       No Known Allergies  No current facility-administered medications on file prior to encounter.     Current Outpatient Medications on File Prior to Encounter   Medication Sig    clobetasol (TEMOVATE) 0.05 % cream Apply 1 application topically to the appropriate area as directed 2 (Two) Times a Day.    norgestimate-ethinyl estradiol (ORTHO-CYCLEN) 0.25-35 MG-MCG per tablet Take 1 tablet by mouth Daily.    sertraline (ZOLOFT) 50 MG tablet Take 1 tablet by mouth Daily.     MEDICATION LIST AND ALLERGIES REVIEWED.    Family History   Problem Relation Age of Onset    Breast cancer Mother     Arthritis Father     Hypertension Father     Arthritis Maternal Grandmother     Breast cancer Maternal Grandmother     Hypertension Maternal Grandmother     Hypertension  "Maternal Grandfather     Arthritis Paternal Grandmother     Ovarian cancer Paternal Grandmother     Breast cancer Paternal Grandmother     Heart attack Paternal Grandmother     Hypertension Paternal Grandmother     Hypertension Paternal Grandfather     Stroke Paternal Grandfather     Kidney cancer Other      Social History     Tobacco Use    Smoking status: Never    Smokeless tobacco: Never   Substance Use Topics    Alcohol use: Yes     Alcohol/week: 0.0 - 7.0 standard drinks of alcohol    Drug use: No     Social History     Social History Narrative    Lives with her BF     FAMILY AND SOCIAL HISTORY REVIEWED.    Review of Systems   Unable to perform ROS: Mental status change     ALL OTHER SYSTEMS REVIEWED AND ARE NEGATIVE.    Physical Exam and Clinical Information   /68   Pulse 85   Temp 97.8 °F (36.6 °C) (Oral)   Resp 20   Ht 177.8 cm (70\")   Wt 72.6 kg (160 lb)   SpO2 97%   BMI 22.96 kg/m²   Physical Exam  Vitals and nursing note reviewed.   Constitutional:       General: She is not in acute distress.     Appearance: She is well-developed and normal weight. She is not ill-appearing or toxic-appearing.   HENT:      Head: Normocephalic and atraumatic.   Cardiovascular:      Rate and Rhythm: Normal rate and regular rhythm.      Pulses: Normal pulses.      Heart sounds: Normal heart sounds. No murmur heard.     No friction rub. No gallop.   Pulmonary:      Effort: Pulmonary effort is normal. No respiratory distress.      Breath sounds: Normal breath sounds. No wheezing, rhonchi or rales.   Musculoskeletal:      Right lower leg: No edema.      Left lower leg: No edema.   Skin:     General: Skin is warm and dry.   Neurological:      Mental Status: She is alert. She is disoriented.         Results from last 7 days   Lab Units 06/10/24  1316   WBC 10*3/mm3 12.13*   HEMOGLOBIN g/dL 11.7*   PLATELETS 10*3/mm3 228     Results from last 7 days   Lab Units 06/10/24  1316   SODIUM mmol/L 139   POTASSIUM mmol/L 3.9 " "  CO2 mmol/L 18.0*   BUN mg/dL 8   CREATININE mg/dL 0.74   GLUCOSE mg/dL 109*     Estimated Creatinine Clearance: 128.6 mL/min (by C-G formula based on SCr of 0.74 mg/dL).          No results found for: \"LACTATE\"       I reviewed the patient's results/ images and I agree with the reports.     Impression     Seizures      Plan/Recommendations     29-year-old female with a history of genital herpes and depression.  Presents to the [facility] ICU on 6/10/2024 with new onset seizures.    -Patient received 2 g of Keppra while in the ER, antiepileptics further per neurology  -Will defer EEG decision to neurology  -LP performed already, plan for coverage with vancomycin, ceftriaxone and acyclovir for now.  -Precedex as needed to help with agitation  -Will likely need MRI, CT of the head was unremarkable  -IV fluids 100 cc/h  -DVT prophylaxis  -A.m. labs    High level of risk due to:  drug(s) requiring intensive monitoring for toxicity.      GARCÍA Moncada DO  Pulmonary and Critical Care Medicine  06/10/24 15:30 EDT     CC: Provider, No Known    "

## 2024-06-10 NOTE — PROGRESS NOTES
"Pharmacy Consult-Vancomycin Dosing  Melani Bhatt is a  29 y.o. female receiving vancomycin therapy.     Indication: Meningitis  Consulting Provider: Intensivist  ID Consult: N    Goal AUC: 500 - 600 mg/L*hr    Current Antimicrobial Therapy  Anti-Infectives (From admission, onward)      Ordered     Dose/Rate Route Frequency Start Stop    06/10/24 1844  Vancomycin HCl 1,250 mg in sodium chloride 0.9 % 250 mL VTB        Ordering Provider: Ramone Guardado RPH    1,250 mg  200 mL/hr over 75 Minutes Intravenous Every 12 Hours 06/11/24 0600 06/25/24 0559    06/10/24 1842  cefTRIAXone (ROCEPHIN) 2,000 mg in sodium chloride 0.9 % 100 mL MBP        Ordering Provider: Ramone Guardado RPH    2,000 mg  200 mL/hr over 30 Minutes Intravenous Every 12 Hours 06/11/24 0400 06/25/24 0359    06/10/24 1842  acyclovir (ZOVIRAX) 700 mg in sodium chloride 0.9 % 250 mL IVPB        Ordering Provider: Ramone Guardado RPH    10 mg/kg × 72.6 kg  over 60 Minutes Intravenous Every 8 Hours 06/11/24 0000 06/24/24 2359    06/10/24 1525  vancomycin IVPB 1500 mg in 0.9% NaCl (Premix) 500 mL        Ordering Provider: Mark Starr MD    22 mg/kg × 72.6 kg  333.3 mL/hr over 90 Minutes Intravenous Once 06/10/24 1600      06/10/24 1525  cefTRIAXone (ROCEPHIN) 2,000 mg in sodium chloride 0.9 % 100 mL MBP        Ordering Provider: Mark Starr MD    2,000 mg  200 mL/hr over 30 Minutes Intravenous Once 06/10/24 1530 06/10/24 1601            Allergies  Allergies as of 06/10/2024    (No Known Allergies)       Labs    Results from last 7 days   Lab Units 06/10/24  1316   BUN mg/dL 8   CREATININE mg/dL 0.74       Results from last 7 days   Lab Units 06/10/24  1316   WBC 10*3/mm3 12.13*       Evaluation of Dosing     Last Dose Received in the ED/Outside Facility: 1500mg IV x1 6/10 @1735  Is Patient on Dialysis or Renal Replacement: N    Ht - 177.8 cm (70\")  Wt - 72.6 kg (160 lb)    Estimated Creatinine Clearance: 128.6 mL/min (by C-G formula " based on SCr of 0.74 mg/dL).    No intake/output data recorded.    Microbiology and Radiology  Microbiology Results (last 10 days)       Procedure Component Value - Date/Time    Culture, CSF - Cerebrospinal Fluid, Lumbar Puncture [[id number]] Collected: 06/10/24 1504    Lab Status: Preliminary result Specimen: Cerebrospinal Fluid from Lumbar Puncture Updated: 06/10/24 1554     Gram Stain No WBCs or organisms seen    Narrative:      <1 ml of body fluid received in the laboratory.  Culture results may be compromised by inadequate volume.      Meningitis / Encephalitis Panel, PCR - Cerebrospinal Fluid, Lumbar Puncture [[id number]]  (Normal) Collected: 06/10/24 1504    Lab Status: Final result Specimen: Cerebrospinal Fluid from Lumbar Puncture Updated: 06/10/24 1657     ESCHERICHIA COLI K1, PCR Not Detected     HAEMOPHILUS INFLUENZAE, PCR Not Detected     LISTERIA MONOCYTOGENES, PCR Not Detected     NEISSERIA MENINGITIDIS, PCR Not Detected     STREPTOCOCCUS AGALACTIAE, PCR Not Detected     STREPTOCOCCUS PNEUMONIAE, PCR Not Detected     CYTOMEGALOVIRUS (CMV), PCR Not Detected     ENTEROVIRUS, PCR Not Detected     HERPES SIMPLEX VIRUS 1 (HSV-1), PCR Not Detected     HERPES SIMPLEX VIRUS 2 (HSV-2), PCR Not Detected     HUMAN PARECHOVIRUS, PCR Not Detected     VARICELLA ZOSTER VIRUS (VZV), PCR Not Detected     CRYPTOCOCCUS NEOFORMANS / GATTII, PCR Not Detected     HUMAN HERPES VIRUS 6 PCR Not Detected            Reported Vancomycin Levels                         [employer] AUC Calculation:    Current AUC:    mg/L*hr    Predicted Steady State AUC on Current Dose:  mg/L*hr  _________________________________    Predicted Steady State AUC on New Dose:   537 mg/L*hr    Assessment/Plan:      Pharmacy consulted to dose vancomycin for Meningitis, goal -600 mg/L*hr.  Patient loaded with vancomycin 1500mg ( ~ 22 mg/kg) x1 6/10 @ 1735  Patient afebrile, serum creatinine is 0.74, BUN is 8, and WBC is 12.13.  Based on initial  evaluation, initiate a maintenance regimen of vancomycin 1250mg ( ~ 17 mg/kg)  A vancomycin random will be assessed on 6/11 @1200 to better assess clearance in critically ill pt.  Will continue to follow and adjust vancomycin dose as needed based on renal function, cultures, and patient clinical status.    Thank you,    Ramone Guardado [facility]  6/10/2024  18:51 EDT

## 2024-06-10 NOTE — ED PROVIDER NOTES
"Subjective   History of Present Illness  Ms Bhatt presents after having a seizure at home.  Mom tells me that she entered the room to find her sitting in a chair slumped over.  She raised her back up and then she proceeded to have rigid tonic-clonic shaking followed by altered mental status.  On arrival to the emergency department family tells me she is not completely back to baseline but is able to talk.  She tells me she has a little bit of a headache.  She denies any recent illnesses.  She tells me that for the last several mornings she has been waking up with a \"vagal\" sensation.  She acknowledges feeling weak and dizzy over the last few mornings.      Review of Systems    Past Medical History:   Diagnosis Date    Acne     Allergic     Depression     Genital herpes     Heart murmur     as a child    Herpes genitalis     Irregular menses     Pap smear for cervical cancer screening 08/2016    Vegan diet        No Known Allergies    Past Surgical History:   Procedure Laterality Date    WISDOM TOOTH EXTRACTION      x4       Family History   Problem Relation Age of Onset    Breast cancer Mother     Arthritis Father     Hypertension Father     Arthritis Maternal Grandmother     Breast cancer Maternal Grandmother     Hypertension Maternal Grandmother     Hypertension Maternal Grandfather     Arthritis Paternal Grandmother     Ovarian cancer Paternal Grandmother     Breast cancer Paternal Grandmother     Heart attack Paternal Grandmother     Hypertension Paternal Grandmother     Hypertension Paternal Grandfather     Stroke Paternal Grandfather     Kidney cancer Other        Social History     Socioeconomic History    Marital status: Single   Tobacco Use    Smoking status: Never    Smokeless tobacco: Never   Vaping Use    Vaping status: Never Used   Substance and Sexual Activity    Alcohol use: Yes     Alcohol/week: 0.0 - 7.0 standard drinks of alcohol    Drug use: No    Sexual activity: Yes     Partners: Male     " Birth control/protection: OCP           Objective   Physical Exam  Vitals and nursing note reviewed.   Constitutional:       General: She is not in acute distress.     Appearance: Normal appearance.      Comments: On initial exam she is awake alert and following commands and able to provide history.  As I am examining her and just as I asked her to stick her tongue out for exam she develops tonic-clonic seizure activity   HENT:      Head: Normocephalic and atraumatic.      Nose: Nose normal. No congestion or rhinorrhea.   Eyes:      General: No scleral icterus.     Conjunctiva/sclera: Conjunctivae normal.   Neck:      Comments: No JVD   Cardiovascular:      Rate and Rhythm: Regular rhythm. Tachycardia present.      Heart sounds: No murmur heard.     No friction rub.   Pulmonary:      Effort: Pulmonary effort is normal.      Breath sounds: Normal breath sounds. No wheezing or rales.   Abdominal:      General: Bowel sounds are normal.      Palpations: Abdomen is soft.      Tenderness: There is no abdominal tenderness. There is no guarding or rebound.   Musculoskeletal:         General: No tenderness.      Cervical back: Normal range of motion and neck supple.      Right lower leg: No edema.      Left lower leg: No edema.   Skin:     General: Skin is warm and dry.      Coloration: Skin is not pale.      Findings: No erythema.   Neurological:      General: No focal deficit present.      Mental Status: She is alert.      Comments: Tonic-clonic seizure activity for 5 to 10 minutes during my initial exam.  Then behavior became combative and consisted of yelling and trying to climb out of bed         Critical Care    Performed by: Mark Starr MD  Authorized by: Michael Moncada DO    Critical care provider statement:     Critical care time (minutes):  60    Critical care was necessary to treat or prevent imminent or life-threatening deterioration of the following conditions:  CNS failure or compromise and  sepsis    Critical care was time spent personally by me on the following activities:  Discussions with consultants, evaluation of patient's response to treatment, examination of patient, obtaining history from patient or surrogate, ordering and performing treatments and interventions, ordering and review of laboratory studies, ordering and review of radiographic studies, pulse oximetry and re-evaluation of patient's condition  Lumbar Puncture    Date/Time: 6/10/2024 8:13 PM    Performed by: Mark Starr MD  Authorized by: Michael Moncada DO    Consent:     Consent obtained:  Written    Consent given by:  Parent    Risks, benefits, and alternatives were discussed: yes    Anesthesia:     Anesthesia method:  Local infiltration    Local anesthetic:  Lidocaine 1% WITH epi  Procedure details:     Lumbar space:  L3-L4 interspace    Patient position:  L lateral decubitus    Needle gauge:  22    Needle type:  Spinal needle - Quincke tip    Needle length (in):  3.5    Number of attempts:  2    Fluid appearance:  Clear    Tubes of fluid:  4    Total volume (ml):  4  Post-procedure details:     Puncture site:  Adhesive bandage applied and direct pressure applied  Comments:      Became restless and agitated during procedure and gave Geodon IM             ED Course  ED Course as of 06/10/24 2015   Mon Lee 10, 2024   1229 During my exam she had witnessed tonic-clonic seizure.  She became very agitated and combative.  Give Valium IV and Ativan IV although IV was questionable and ultimately we removed it.  We gave a total of 20 mg IM Valium.  We have enacted soft restraints.  She is now coming down.  I have spoken with her parents about findings and plan for workup. [DT]   1237 Still confused and restless but not combative.  We have established a reliable IV.  Keppra is in.  Will give a second gram of Keppra. [DT]   1314 She is starting to get combative again.  I have consulted with our pharmacist.  He recommends  Precedex.  I have ordered Precedex infusion.  Have ordered a dose of Ativan IV as well. [DT]   1330 Blood pressure now trending downward, have ordered fluid bolus.  Labs have been drawn.  Awaiting urine.  Awaiting CT scans. [DT]   1421 CT head normal.  Labs unremarkable.  Blood pressure has dropped again, have held Precedex.  Will proceed with spinal tap [DT]   1451 Performed spinal tap, clear fluid.  Overall is sleepy.  Dropped her blood pressure again, has had to hold Precedex.  Gave 10 of Geodon IM for spinal tap.  Still arouses and tries to climb out of bed.  Will admit to the ICU.  Will consult neurology. [DT]   1459 D/w Dr Moncada who will admit to the ICU.  Have ordered acyclovir. [DT]   1557 Discussed with Dr. Lopez who asked that we had EEG and give 1/3 g of Keppra.  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 [DT]      ED Course User Index  [DT] Mark Starr MD                                             Medical Decision Making  Please see course notes.  I obtained history from family.  I remained at bedside for greater than 1 hour.  Initiated infusion of sedation.  Titrated multiple doses sedation consisting of Valium, Ativan, Precedex, and Geodon.  Performed spinal tap.  Ordered and interpreted CT scan and multiple labs.  Gave sepsis antibiotics.  Consulted neurology.  Consulted the intensivist service and admitted her to the ICU    Problems Addressed:  Delirium: complicated acute illness or injury that poses a threat to life or bodily functions  New onset seizure: complicated acute illness or injury    Amount and/or Complexity of Data Reviewed  Independent Historian: parent  Labs: ordered. Decision-making details documented in ED Course.  Radiology: ordered. Decision-making details documented in ED Course.    Risk  Prescription drug management.  Decision regarding hospitalization.    Critical Care  Total time providing  critical care: 60 minutes        Final diagnoses:   New onset seizure   Delirium       ED Disposition  ED Disposition       ED Disposition   Decision to Admit    Condition   --    Comment   Level of Care: Critical Care [6]   Admitting Physician: TAJ CABRAL [[id number]]                 No follow-up provider specified.       Medication List      No changes were made to your prescriptions during this visit.            Mark Starr MD  06/10/24 2015

## 2024-06-11 ENCOUNTER — APPOINTMENT (OUTPATIENT)
Dept: MRI IMAGING | Facility: HOSPITAL | Age: 29
End: 2024-06-11
Payer: OTHER MISCELLANEOUS

## 2024-06-11 LAB
ANION GAP SERPL CALCULATED.3IONS-SCNC: 10 MMOL/L (ref 5–15)
BASOPHILS # BLD AUTO: 0.02 10*3/MM3 (ref 0–0.2)
BASOPHILS NFR BLD AUTO: 0.1 % (ref 0–1.5)
BUN SERPL-MCNC: 8 MG/DL (ref 6–20)
BUN/CREAT SERPL: 11.4 (ref 7–25)
CALCIUM SPEC-SCNC: 8 MG/DL (ref 8.6–10.5)
CHLORIDE SERPL-SCNC: 111 MMOL/L (ref 98–107)
CO2 SERPL-SCNC: 20 MMOL/L (ref 22–29)
CREAT SERPL-MCNC: 0.7 MG/DL (ref 0.57–1)
DEPRECATED RDW RBC AUTO: 45 FL (ref 37–54)
EGFRCR SERPLBLD CKD-EPI 2021: 120.2 ML/MIN/1.73
EOSINOPHIL # BLD AUTO: 0 10*3/MM3 (ref 0–0.4)
EOSINOPHIL NFR BLD AUTO: 0 % (ref 0.3–6.2)
ERYTHROCYTE [DISTWIDTH] IN BLOOD BY AUTOMATED COUNT: 14.1 % (ref 12.3–15.4)
GLUCOSE BLDC GLUCOMTR-MCNC: 94 MG/DL (ref 70–130)
GLUCOSE SERPL-MCNC: 102 MG/DL (ref 65–99)
HCT VFR BLD AUTO: 32.8 % (ref 34–46.6)
HGB BLD-MCNC: 10.6 G/DL (ref 12–15.9)
IMM GRANULOCYTES # BLD AUTO: 0.06 10*3/MM3 (ref 0–0.05)
IMM GRANULOCYTES NFR BLD AUTO: 0.4 % (ref 0–0.5)
LYMPHOCYTES # BLD AUTO: 1.52 10*3/MM3 (ref 0.7–3.1)
LYMPHOCYTES NFR BLD AUTO: 10.7 % (ref 19.6–45.3)
MAGNESIUM SERPL-MCNC: 2.1 MG/DL (ref 1.6–2.6)
MCH RBC QN AUTO: 28 PG (ref 26.6–33)
MCHC RBC AUTO-ENTMCNC: 32.3 G/DL (ref 31.5–35.7)
MCV RBC AUTO: 86.8 FL (ref 79–97)
MONOCYTES # BLD AUTO: 0.98 10*3/MM3 (ref 0.1–0.9)
MONOCYTES NFR BLD AUTO: 6.9 % (ref 5–12)
NEUTROPHILS NFR BLD AUTO: 11.61 10*3/MM3 (ref 1.7–7)
NEUTROPHILS NFR BLD AUTO: 81.9 % (ref 42.7–76)
NRBC BLD AUTO-RTO: 0 /100 WBC (ref 0–0.2)
PHOSPHATE SERPL-MCNC: 3.7 MG/DL (ref 2.5–4.5)
PLATELET # BLD AUTO: 238 10*3/MM3 (ref 140–450)
PMV BLD AUTO: 10.4 FL (ref 6–12)
POTASSIUM SERPL-SCNC: 4 MMOL/L (ref 3.5–5.2)
RBC # BLD AUTO: 3.78 10*6/MM3 (ref 3.77–5.28)
SODIUM SERPL-SCNC: 141 MMOL/L (ref 136–145)
VANCOMYCIN SERPL-MCNC: 5 MCG/ML (ref 5–40)
WBC NRBC COR # BLD AUTO: 14.19 10*3/MM3 (ref 3.4–10.8)

## 2024-06-11 PROCEDURE — 25010000002 ENOXAPARIN PER 10 MG: Performed by: INTERNAL MEDICINE

## 2024-06-11 PROCEDURE — 25010000002 CEFTRIAXONE PER 250 MG

## 2024-06-11 PROCEDURE — 25810000003 SODIUM CHLORIDE 0.9 % SOLUTION 250 ML FLEX CONT

## 2024-06-11 PROCEDURE — 99232 SBSQ HOSP IP/OBS MODERATE 35: CPT | Performed by: INTERNAL MEDICINE

## 2024-06-11 PROCEDURE — 0 GADOBENATE DIMEGLUMINE 529 MG/ML SOLUTION: Performed by: INTERNAL MEDICINE

## 2024-06-11 PROCEDURE — 85025 COMPLETE CBC W/AUTO DIFF WBC: CPT | Performed by: INTERNAL MEDICINE

## 2024-06-11 PROCEDURE — 82948 REAGENT STRIP/BLOOD GLUCOSE: CPT

## 2024-06-11 PROCEDURE — A9577 INJ MULTIHANCE: HCPCS | Performed by: INTERNAL MEDICINE

## 2024-06-11 PROCEDURE — 25010000002 VANCOMYCIN HCL 1.25 G RECONSTITUTED SOLUTION 1 EACH VIAL

## 2024-06-11 PROCEDURE — 70553 MRI BRAIN STEM W/O & W/DYE: CPT

## 2024-06-11 PROCEDURE — 25810000003 LACTATED RINGERS PER 1000 ML: Performed by: INTERNAL MEDICINE

## 2024-06-11 PROCEDURE — 84100 ASSAY OF PHOSPHORUS: CPT | Performed by: INTERNAL MEDICINE

## 2024-06-11 PROCEDURE — 99223 1ST HOSP IP/OBS HIGH 75: CPT | Performed by: PSYCHIATRY & NEUROLOGY

## 2024-06-11 PROCEDURE — 80202 ASSAY OF VANCOMYCIN: CPT

## 2024-06-11 PROCEDURE — 80048 BASIC METABOLIC PNL TOTAL CA: CPT

## 2024-06-11 PROCEDURE — 83735 ASSAY OF MAGNESIUM: CPT | Performed by: INTERNAL MEDICINE

## 2024-06-11 RX ORDER — LEVETIRACETAM 500 MG/1
500 TABLET ORAL EVERY 12 HOURS SCHEDULED
Status: DISCONTINUED | OUTPATIENT
Start: 2024-06-11 | End: 2024-06-12 | Stop reason: HOSPADM

## 2024-06-11 RX ORDER — ACETAMINOPHEN 325 MG/1
650 TABLET ORAL EVERY 6 HOURS PRN
Status: DISCONTINUED | OUTPATIENT
Start: 2024-06-11 | End: 2024-06-12 | Stop reason: HOSPADM

## 2024-06-11 RX ADMIN — SODIUM CHLORIDE 2000 MG: 900 INJECTION INTRAVENOUS at 03:36

## 2024-06-11 RX ADMIN — SODIUM CHLORIDE, POTASSIUM CHLORIDE, SODIUM LACTATE AND CALCIUM CHLORIDE 100 ML/HR: 600; 310; 30; 20 INJECTION, SOLUTION INTRAVENOUS at 02:23

## 2024-06-11 RX ADMIN — ENOXAPARIN SODIUM 40 MG: 100 INJECTION SUBCUTANEOUS at 20:09

## 2024-06-11 RX ADMIN — VANCOMYCIN HYDROCHLORIDE 1250 MG: 1.25 INJECTION, POWDER, LYOPHILIZED, FOR SOLUTION INTRAVENOUS at 06:11

## 2024-06-11 RX ADMIN — GADOBENATE DIMEGLUMINE 20 ML: 529 INJECTION, SOLUTION INTRAVENOUS at 12:08

## 2024-06-11 RX ADMIN — LEVETIRACETAM 500 MG: 500 TABLET, FILM COATED ORAL at 20:08

## 2024-06-11 RX ADMIN — LEVETIRACETAM 500 MG: 500 TABLET, FILM COATED ORAL at 13:44

## 2024-06-11 RX ADMIN — ACETAMINOPHEN 650 MG: 325 TABLET ORAL at 21:51

## 2024-06-11 RX ADMIN — Medication 10 ML: at 20:09

## 2024-06-11 NOTE — PROGRESS NOTES
INTENSIVIST   PROGRESS NOTE     Hospital:  LOS: 1 day     Ms. Melani Bhatt, 29 y.o. female is followed for a Chief Complaint of: Seizure      Subjective   S     Interval History:  No further seizures since admission. Precedex is now off.        The patient's relevant past medical, surgical and social history were reviewed and updated in Epic as appropriate.      ROS:   Constitutional: Negative for fever.   Respiratory: Negative for dyspnea.   Cardiovascular: Negative for chest pain.   Gastrointestinal: Negative for  nausea, vomiting and diarrhea.     Objective   O     Vitals:  Temp  Min: 97 °F (36.1 °C)  Max: 98.1 °F (36.7 °C)  BP  Min: 73/45  Max: 111/77  Pulse  Min: 64  Max: 104  Resp  Min: 16  Max: 18  SpO2  Min: 91 %  Max: 100 % No data recorded    Intake/Ouptut 24 hrs (7:00AM - 6:59 AM)  Intake & Output (last 3 days)         06/08 0701  06/09 0700 06/09 0701  06/10 0700 06/10 0701  06/11 0700 06/11 0701  06/12 0700    P.O.   240     I.V. (mL/kg)   1395.3 (17.3)     IV Piggyback   4854     Total Intake(mL/kg)   6489.3 (80.6)     Urine (mL/kg/hr)   2100     Stool   0     Total Output   2100     Net   +4389.3             Urine Unmeasured Occurrence   1 x 1 x    Stool Unmeasured Occurrence   1 x             Medications (drips):  dexmedetomidine, Last Rate: Stopped (06/10/24 2255)          Physical Examination  Telemetry:  Normal sinus rhythm.    Constitutional:  No acute distress.  Resting in bed.    Eyes: No scleral icterus.   PERRL, EOM intact.    Neck:  Supple, FROM   Cardiovascular: Normal rate, regular and rhythm. Normal heart sounds.  No murmurs, gallop or rub.   Respiratory: No respiratory distress. Normal respiratory effort.  Normal breath sounds  Clear to auscultation   Abdominal:  Soft. No masses. Nontender. No distension. No HSM.   Extremities: No digital cyanosis. No clubbing.  No peripheral edema.   Skin: No rashes, lesions or ulcers   Neurological:   Alert and Oriented to person, place, and time.              Results from last 7 days   Lab Units 06/11/24  0610 06/10/24  1316   WBC 10*3/mm3 14.19* 12.13*   HEMOGLOBIN g/dL 10.6* 11.7*   MCV fL 86.8 89.6   PLATELETS 10*3/mm3 238 228     Results from last 7 days   Lab Units 06/11/24  0610 06/10/24  1316   SODIUM mmol/L 141 139   POTASSIUM mmol/L 4.0 3.9   CO2 mmol/L 20.0* 18.0*   CREATININE mg/dL 0.70 0.74   GLUCOSE mg/dL 102* 109*   MAGNESIUM mg/dL 2.1  --    PHOSPHORUS mg/dL 3.7  --      Estimated Creatinine Clearance: 150.7 mL/min (by C-G formula based on SCr of 0.7 mg/dL).  Results from last 7 days   Lab Units 06/10/24  1316   ALK PHOS U/L 64   BILIRUBIN mg/dL 0.2   ALT (SGPT) U/L 18   AST (SGOT) U/L 25             Images:  Imaging Results (Last 24 Hours)       Procedure Component Value Units Date/Time    MRI Brain With & Without Contrast [[id number]] Collected: 06/11/24 1226     Updated: 06/11/24 1235    Narrative:        MRI BRAIN W WO CONTRAST    Date of Exam: 6/11/2024 11:30 AM EDT    Indication: New Onset Seizures.     Comparison: CT 6/10/2024.    Technique:  Routine multiplanar/multisequence sequence images of the brain were obtained before and after the uneventful administration of 13 mL Multihance.      Findings:  No acute infarct is present on diffusion weighted sequences. Midline structures are normal and the craniocervical junction appears satisfactory. Gray-white differentiation is maintained and there is no evidence of intracranial hemorrhage, mass or mass   effect. The ventricles are normal in size and configuration. The orbits are normal. The paranasal sinuses are clear. Intracranial arterial flow voids are maintained. There is no abnormal brain parenchymal enhancement. Additional thin cut dedicated   evaluation of the mesial temporal lobe structures demonstrates no evidence of atrophy or other focal hippocampal asymmetry.      Impression:      Impression:  Normal contrast-enhanced MRI of the brain, including dedicated evaluation of the mesial  temporal lobe structures without evidence of atrophy or focal hippocampal asymmetry to more specifically suggest mesial temporal sclerosis.        Electronically Signed: Ha Stanley MD    6/11/2024 12:32 PM EDT    Workstation ID: [id number]    XR Chest 1 View [[id number]] Collected: 06/10/24 1833     Updated: 06/10/24 1838    Narrative:      XR ABDOMEN KUB, XR CHEST 1 VW    Date of Exam: 6/10/2024 5:29 PM EDT    Indication: MRI clearence    Comparison: CT abdomen pelvis 11/8/2022    Findings:  Asymmetric patchy left lower lobe opacities. No edema, large effusion or pneumothorax. Heart size normal. Osseous structures grossly unremarkable. No visualized metal.    No dilated loops of bowel to suggest ileus or obstruction. Mild formed colonic stool. Osseous structures unremarkable. No visible metal.      Impression:      Impression:  1. No visible metal in the chest or abdomen.  2. Asymmetric patchy left lower lobe opacities. Differential includes atelectasis, aspiration and developing bronchopneumonia.  3. Nonobstructive bowel gas pattern.      Electronically Signed: Ramone Rojas MD    6/10/2024 6:35 PM EDT    Workstation ID: [id number]    XR Abdomen KUB [[id number]] Collected: 06/10/24 1833     Updated: 06/10/24 1838    Narrative:      XR ABDOMEN KUB, XR CHEST 1 VW    Date of Exam: 6/10/2024 5:29 PM EDT    Indication: MRI clearence    Comparison: CT abdomen pelvis 11/8/2022    Findings:  Asymmetric patchy left lower lobe opacities. No edema, large effusion or pneumothorax. Heart size normal. Osseous structures grossly unremarkable. No visualized metal.    No dilated loops of bowel to suggest ileus or obstruction. Mild formed colonic stool. Osseous structures unremarkable. No visible metal.      Impression:      Impression:  1. No visible metal in the chest or abdomen.  2. Asymmetric patchy left lower lobe opacities. Differential includes atelectasis, aspiration and developing bronchopneumonia.  3. Nonobstructive bowel  gas pattern.      Electronically Signed: Ramone Rojas MD    6/10/2024 6:35 PM EDT    Workstation ID: [id number]    CT Head Without Contrast [[id number]] Collected: 06/10/24 1408     Updated: 06/10/24 1414    Narrative:      CT HEAD WO CONTRAST    Date of Exam: 6/10/2024 1:56 PM EDT    Indication: ams.    Comparison: None available.    Technique: Axial CT images were obtained of the head without contrast administration.  Automated exposure control and iterative construction methods were used.      Findings:  The calvarium appears intact. Included paranasal sinuses and mastoids appear clear. No gross abnormalities are seen in the orbits. The brain appears within normal limits. There is no evidence of hemorrhage, contusion, or edema, no evidence of mass or   mass effect, hydrocephalus, or abnormal extra-axial collection. No focal or generalized encephalomalacia is seen.      Impression:      Impression:  No evidence of acute intracranial disease.        Electronically Signed: Eric Estrella MD    6/10/2024 2:11 PM EDT    Workstation ID: [id number]               Results: Reviewed.  I reviewed the patient's new laboratory and imaging results.  I independently reviewed the patient's new images.    Medications: Reviewed.    Assessment & Plan   A / P     Ms. Bhatt is a 28yo F who was admitted on 6/10/24 for new onset seizures. LP was performed and was unremarkable.     Nutrition:   Diet: Regular/House; Fluid Consistency: Thin (IDDSI 0)  Advance Directives:   Code Status and Medical Interventions:   Ordered at: 06/10/24 1528     Level Of Support Discussed With:    Health Care Surrogate     Code Status (Patient has no pulse and is not breathing):    CPR (Attempt to Resuscitate)     Medical Interventions (Patient has pulse or is breathing):    Full Support       Active Hospital Problems    Diagnosis     **Seizures        Assessment / Plan:    MRI Brain today.   Stop antibiotics as LP was negative.   Neurology following. Continue  Keppra. Change to PO.   Start a PO diet  AM labs  Anticipate discharge home tomorrow.     High level of risk due to:  severe exacerbation of chronic illness and illness with threat to life or bodily function.    Plan of care and goals reviewed during interdisciplinary rounds.  I discussed the patient's findings and my recommendations with patient and nursing staff      Merced Walton, DO    Intensive Care Medicine and Pulmonary Medicine

## 2024-06-11 NOTE — PAYOR COMM NOTE
"Ref# [id number]    Utilization Review  Phone [phone]  Fax [phone]    [facility]  [address] [place] [address]         LucaMelani silverman (29 y.o. Female)       Date of Birth   1995    Social Security Number       Address   [address] [facility] [address]    Home Phone   [phone]    MRN   [id number]       Yazidism   None    Marital Status   Single                            Admission Date   6/10/24    Admission Type   Emergency    Admitting Provider   Michael Moncada DO    Attending Provider   Merced Walton DO    Department, Room/Bed   [facility] 2B ICU, N236/1       Discharge Date       Discharge Disposition       Discharge Destination                                 Attending Provider: Merced Walton DO    Allergies: No Known Allergies    Isolation: None   Infection: None   Code Status: CPR    Ht: 177.8 cm (70\")   Wt: 80.5 kg (177 lb 7.5 oz)    Admission Cmt: None   Principal Problem: Seizures [R56.9]                   Active Insurance as of 6/10/2024       Primary Coverage       Payor Plan Insurance Group Employer/Plan Group    [facility] COMMUNITY PLAN [employer] PLAN [facility] [place]       Payor Plan Address Payor Plan Phone Number Payor Plan Fax Number Effective Dates    [address]   1/1/2022 - None Entered    [facility] [id number]         Subscriber Name Subscriber Birth Date Member ID       MELANI SEGOVIA 1995 [id number]                     Emergency Contacts        (Rel.) Home Phone Work Phone Mobile Phone    LUCATANYA (Mother) [phone] -- [phone]    LucaBasilio (Father) -- -- [phone]              [place]: [facility] [id number] Tax ID [id number]  Insurance Information                  [facility] COMMUNITY PLAN [facility] PLAN OF [place] Phone: --    Subscriber: Melani Segovia Subscriber#: [id number]    Group#: [id number] Precert#: --             History & Physical        Antwan, " Michael Cardenas DO at 06/10/24 1528            Intensive Care Admission Note     Chief Complaint:  Seizures    History of Present Illness     29-year-old female with a past medical history significant for genital herpes and depression.  Who brought to the [facility] ICU on 16 2024.  Patient started having generalized seizures this afternoon at home, while in the ER had a second seizure.  Between the initial 2 seizures she was returning back to baseline but after the second seizure continue to be extremely combative.  Patient was started on Precedex develop some hypotension.  Per the patient's family she was not sick or complaining of anything recently.  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  Although the parents are unaware if there has been any recent ticks.    Problem List, Surgical History, Family, Social History, and ROS     Past Medical History:   Diagnosis Date    Acne     Allergic     Depression     Genital herpes     Heart murmur     as a child    Herpes genitalis     Irregular menses     Pap smear for cervical cancer screening 08/2016    Vegan diet        Past Surgical History:   Procedure Laterality Date    WISDOM TOOTH EXTRACTION      x4       No Known Allergies  No current facility-administered medications on file prior to encounter.     Current Outpatient Medications on File Prior to Encounter   Medication Sig    clobetasol (TEMOVATE) 0.05 % cream Apply 1 application topically to the appropriate area as directed 2 (Two) Times a Day.    norgestimate-ethinyl estradiol (ORTHO-CYCLEN) 0.25-35 MG-MCG per tablet Take 1 tablet by mouth Daily.    sertraline (ZOLOFT) 50 MG tablet Take 1 tablet by mouth Daily.     MEDICATION LIST AND ALLERGIES REVIEWED.    Family History   Problem Relation Age of Onset    Breast cancer Mother     Arthritis Father     Hypertension Father     Arthritis Maternal Grandmother     Breast cancer Maternal Grandmother      "Hypertension Maternal Grandmother     Hypertension Maternal Grandfather     Arthritis Paternal Grandmother     Ovarian cancer Paternal Grandmother     Breast cancer Paternal Grandmother     Heart attack Paternal Grandmother     Hypertension Paternal Grandmother     Hypertension Paternal Grandfather     Stroke Paternal Grandfather     Kidney cancer Other      Social History     Tobacco Use    Smoking status: Never    Smokeless tobacco: Never   Substance Use Topics    Alcohol use: Yes     Alcohol/week: 0.0 - 7.0 standard drinks of alcohol    Drug use: No     Social History     Social History Narrative    Lives with her BF     FAMILY AND SOCIAL HISTORY REVIEWED.    Review of Systems   Unable to perform ROS: Mental status change     ALL OTHER SYSTEMS REVIEWED AND ARE NEGATIVE.    Physical Exam and Clinical Information   /68   Pulse 85   Temp 97.8 °F (36.6 °C) (Oral)   Resp 20   Ht 177.8 cm (70\")   Wt 72.6 kg (160 lb)   SpO2 97%   BMI 22.96 kg/m²   Physical Exam  Vitals and nursing note reviewed.   Constitutional:       General: She is not in acute distress.     Appearance: She is well-developed and normal weight. She is not ill-appearing or toxic-appearing.   HENT:      Head: Normocephalic and atraumatic.   Cardiovascular:      Rate and Rhythm: Normal rate and regular rhythm.      Pulses: Normal pulses.      Heart sounds: Normal heart sounds. No murmur heard.     No friction rub. No gallop.   Pulmonary:      Effort: Pulmonary effort is normal. No respiratory distress.      Breath sounds: Normal breath sounds. No wheezing, rhonchi or rales.   Musculoskeletal:      Right lower leg: No edema.      Left lower leg: No edema.   Skin:     General: Skin is warm and dry.   Neurological:      Mental Status: She is alert. She is disoriented.         Results from last 7 days   Lab Units 06/10/24  1316   WBC 10*3/mm3 12.13*   HEMOGLOBIN g/dL 11.7*   PLATELETS 10*3/mm3 228     Results from last 7 days   Lab Units " "06/10/24  1316   SODIUM mmol/L 139   POTASSIUM mmol/L 3.9   CO2 mmol/L 18.0*   BUN mg/dL 8   CREATININE mg/dL 0.74   GLUCOSE mg/dL 109*     Estimated Creatinine Clearance: 128.6 mL/min (by C-G formula based on SCr of 0.74 mg/dL).          No results found for: \"LACTATE\"       I reviewed the patient's results/ images and I agree with the reports.     Impression     Seizures      Plan/Recommendations     29-year-old female with a history of genital herpes and depression.  Presents to the [facility] ICU on 6/10/2024 with new onset seizures.    -Patient received 2 g of Keppra while in the ER, antiepileptics further per neurology  -Will defer EEG decision to neurology  -LP performed already, plan for coverage with vancomycin, ceftriaxone and acyclovir for now.  -Precedex as needed to help with agitation  -Will likely need MRI, CT of the head was unremarkable  -IV fluids 100 cc/h  -DVT prophylaxis  -A.m. labs    High level of risk due to:  drug(s) requiring intensive monitoring for toxicity.      GARCÍA Moncada DO  Pulmonary and Critical Care Medicine  06/10/24 15:30 EDT     CC: Provider, No Known      Electronically signed by Michael Moncada DO at 06/10/24 1532          Emergency Department Notes        Mark Starr MD at 06/10/24 1227        Procedure Orders    1. Critical Care [[id number]] ordered by Mark Starr MD    2. Lumbar Puncture [[id number]] ordered by Mark Starr MD                 Subjective   History of Present Illness  Ms Bhatt presents after having a seizure at home.  Mom tells me that she entered the room to find her sitting in a chair slumped over.  She raised her back up and then she proceeded to have rigid tonic-clonic shaking followed by altered mental status.  On arrival to the emergency department family tells me she is not completely back to baseline but is able to talk.  She tells me she has a little bit of a headache.  She denies any recent illnesses.  " "She tells me that for the last several mornings she has been waking up with a \"vagal\" sensation.  She acknowledges feeling weak and dizzy over the last few mornings.      Review of Systems    Past Medical History:   Diagnosis Date    Acne     Allergic     Depression     Genital herpes     Heart murmur     as a child    Herpes genitalis     Irregular menses     Pap smear for cervical cancer screening 08/2016    Vegan diet        No Known Allergies    Past Surgical History:   Procedure Laterality Date    WISDOM TOOTH EXTRACTION      x4       Family History   Problem Relation Age of Onset    Breast cancer Mother     Arthritis Father     Hypertension Father     Arthritis Maternal Grandmother     Breast cancer Maternal Grandmother     Hypertension Maternal Grandmother     Hypertension Maternal Grandfather     Arthritis Paternal Grandmother     Ovarian cancer Paternal Grandmother     Breast cancer Paternal Grandmother     Heart attack Paternal Grandmother     Hypertension Paternal Grandmother     Hypertension Paternal Grandfather     Stroke Paternal Grandfather     Kidney cancer Other        Social History     Socioeconomic History    Marital status: Single   Tobacco Use    Smoking status: Never    Smokeless tobacco: Never   Vaping Use    Vaping status: Never Used   Substance and Sexual Activity    Alcohol use: Yes     Alcohol/week: 0.0 - 7.0 standard drinks of alcohol    Drug use: No    Sexual activity: Yes     Partners: Male     Birth control/protection: OCP           Objective   Physical Exam  Vitals and nursing note reviewed.   Constitutional:       General: She is not in acute distress.     Appearance: Normal appearance.      Comments: On initial exam she is awake alert and following commands and able to provide history.  As I am examining her and just as I asked her to stick her tongue out for exam she develops tonic-clonic seizure activity   HENT:      Head: Normocephalic and atraumatic.      Nose: Nose normal. No " congestion or rhinorrhea.   Eyes:      General: No scleral icterus.     Conjunctiva/sclera: Conjunctivae normal.   Neck:      Comments: No JVD   Cardiovascular:      Rate and Rhythm: Regular rhythm. Tachycardia present.      Heart sounds: No murmur heard.     No friction rub.   Pulmonary:      Effort: Pulmonary effort is normal.      Breath sounds: Normal breath sounds. No wheezing or rales.   Abdominal:      General: Bowel sounds are normal.      Palpations: Abdomen is soft.      Tenderness: There is no abdominal tenderness. There is no guarding or rebound.   Musculoskeletal:         General: No tenderness.      Cervical back: Normal range of motion and neck supple.      Right lower leg: No edema.      Left lower leg: No edema.   Skin:     General: Skin is warm and dry.      Coloration: Skin is not pale.      Findings: No erythema.   Neurological:      General: No focal deficit present.      Mental Status: She is alert.      Comments: Tonic-clonic seizure activity for 5 to 10 minutes during my initial exam.  Then behavior became combative and consisted of yelling and trying to climb out of bed         Critical Care    Performed by: Mark Starr MD  Authorized by: Michael Moncada DO    Critical care provider statement:     Critical care time (minutes):  60    Critical care was necessary to treat or prevent imminent or life-threatening deterioration of the following conditions:  CNS failure or compromise and sepsis    Critical care was time spent personally by me on the following activities:  Discussions with consultants, evaluation of patient's response to treatment, examination of patient, obtaining history from patient or surrogate, ordering and performing treatments and interventions, ordering and review of laboratory studies, ordering and review of radiographic studies, pulse oximetry and re-evaluation of patient's condition  Lumbar Puncture    Date/Time: 6/10/2024 8:13 PM    Performed by:  Mark Starr MD  Authorized by: Michael Moncada DO    Consent:     Consent obtained:  Written    Consent given by:  Parent    Risks, benefits, and alternatives were discussed: yes    Anesthesia:     Anesthesia method:  Local infiltration    Local anesthetic:  Lidocaine 1% WITH epi  Procedure details:     Lumbar space:  L3-L4 interspace    Patient position:  L lateral decubitus    Needle gauge:  22    Needle type:  Spinal needle - Quincke tip    Needle length (in):  3.5    Number of attempts:  2    Fluid appearance:  Clear    Tubes of fluid:  4    Total volume (ml):  4  Post-procedure details:     Puncture site:  Adhesive bandage applied and direct pressure applied  Comments:      Became restless and agitated during procedure and gave Geodon IM            ED Course  ED Course as of 06/10/24 2015   Mon Lee 10, 2024   1229 During my exam she had witnessed tonic-clonic seizure.  She became very agitated and combative.  Give Valium IV and Ativan IV although IV was questionable and ultimately we removed it.  We gave a total of 20 mg IM Valium.  We have enacted soft restraints.  She is now coming down.  I have spoken with her parents about findings and plan for workup. [DT]   1237 Still confused and restless but not combative.  We have established a reliable IV.  Keppra is in.  Will give a second gram of Keppra. [DT]   1314 She is starting to get combative again.  I have consulted with our pharmacist.  He recommends Precedex.  I have ordered Precedex infusion.  Have ordered a dose of Ativan IV as well. [DT]   1330 Blood pressure now trending downward, have ordered fluid bolus.  Labs have been drawn.  Awaiting urine.  Awaiting CT scans. [DT]   1421 CT head normal.  Labs unremarkable.  Blood pressure has dropped again, have held Precedex.  Will proceed with spinal tap [DT]   1451 Performed spinal tap, clear fluid.  Overall is sleepy.  Dropped her blood pressure again, has had to hold Precedex.  Gave 10 of  Geodon IM for spinal tap.  Still arouses and tries to climb out of bed.  Will admit to the ICU.  Will consult neurology. [DT]   1459 D/w Dr Moncada who will admit to the ICU.  Have ordered acyclovir. [DT]   1557 Discussed with Dr. Lopez who asked that we had EEG and give 1/3 g of Keppra.  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 [DT]      ED Course User Index  [DT] Mark Starr MD                                             Medical Decision Making  Please see course notes.  I obtained history from family.  I remained at bedside for greater than 1 hour.  Initiated infusion of sedation.  Titrated multiple doses sedation consisting of Valium, Ativan, Precedex, and Geodon.  Performed spinal tap.  Ordered and interpreted CT scan and multiple labs.  Gave sepsis antibiotics.  Consulted neurology.  Consulted the intensivist service and admitted her to the ICU    Problems Addressed:  Delirium: complicated acute illness or injury that poses a threat to life or bodily functions  New onset seizure: complicated acute illness or injury    Amount and/or Complexity of Data Reviewed  Independent Historian: parent  Labs: ordered. Decision-making details documented in ED Course.  Radiology: ordered. Decision-making details documented in ED Course.    Risk  Prescription drug management.  Decision regarding hospitalization.    Critical Care  Total time providing critical care: 60 minutes        Final diagnoses:   New onset seizure   Delirium       ED Disposition  ED Disposition       ED Disposition   Decision to Admit    Condition   --    Comment   Level of Care: Critical Care [6]   Admitting Physician: TAJ MONCADA [[id number]]                 No follow-up provider specified.       Medication List      No changes were made to your prescriptions during this visit.            Mark Starr MD  06/10/24 2015      Electronically signed by  Mark Starr MD at 06/10/24 2015       Vital Signs (last 2 days)       Date/Time Temp Temp src Pulse Resp BP Patient Position SpO2    06/11/24 0900 -- -- 82 -- 111/77 -- 99    06/11/24 0800 98.1 (36.7) Oral 71 16 97/64 Lying 97    06/11/24 0700 -- -- 97 -- 107/78 -- 93    06/11/24 0603 -- -- 82 -- 101/74 Lying 97    06/11/24 0600 -- -- 104 18 -- Lying 96    06/11/24 0500 -- -- 66 -- 100/67 -- 96    06/11/24 0400 -- -- 75 -- 105/74 -- 97    06/11/24 0330 -- -- -- 16 -- Lying --    06/11/24 0300 -- -- 66 -- 98/69 -- 95    06/11/24 0200 -- -- 66 -- 100/70 -- 95    06/11/24 0100 -- -- 64 -- 94/69 -- 97    06/11/24 0000 97.9 (36.6) Oral 65 16 98/66 Lying 96    06/10/24 2300 -- -- 68 -- -- -- 96    06/10/24 2200 -- -- 70 18 94/69 Lying 96    06/10/24 2100 -- -- 68 -- 97/68 -- 96    06/10/24 2000 97 (36.1) Axillary 67 16 98/68 Lying 96    06/10/24 1900 -- -- 70 -- 99/71 -- 96    06/10/24 1830 -- -- 68 -- -- -- 96    06/10/24 1815 -- -- 69 -- -- -- 98    06/10/24 1801 -- -- 73 -- -- -- 96    06/10/24 1800 -- -- 90 18 101/67 Lying --    06/10/24 1745 -- -- 89 -- 102/67 -- 97    06/10/24 1730 -- -- 86 -- 97/71 -- 99    06/10/24 1715 -- -- 82 -- 93/70 -- 97    06/10/24 1700 -- -- 80 -- 93/69 -- 96    06/10/24 1645 -- -- 80 -- 95/69 -- 95    06/10/24 1630 -- -- 84 -- 102/69 -- 94    06/10/24 1623 -- -- 93 -- 109/76 -- 91    06/10/24 1600 97.9 (36.6) Axillary 82 16 94/70 Lying 97    06/10/24 1556 -- -- 82 -- 94/66 -- 95    06/10/24 1554 -- -- -- -- 93/65 -- --    06/10/24 1552 -- -- -- -- 93/64 -- --    06/10/24 1548 -- -- -- -- 110/50 -- --    06/10/24 1532 -- -- 87 -- 104/74 -- 97    06/10/24 1528 -- -- 87 -- 99/71 -- 98    06/10/24 1524 -- -- 85 -- 96/69 -- 98    06/10/24 1520 -- -- 87 -- 95/68 -- 97    06/10/24 1516 -- -- 84 -- 98/71 -- 97    06/10/24 1512 -- -- 85 -- 100/68 -- 97    06/10/24 1508 -- -- 80 -- 94/71 -- 99    06/10/24 1454 -- -- 80 -- 92/63 -- 99    06/10/24 1452 -- -- 73 -- 73/45 -- 99    06/10/24 1448 --  -- 79 -- 91/25 -- 99    06/10/24 1444 -- -- 86 -- 92/55 -- 100    06/10/24 1428 -- -- 75 -- 94/77 -- 100    06/10/24 1420 -- -- 68 -- 82/52 -- 100    06/10/24 1416 -- -- 69 -- 77/51 -- 100    06/10/24 1411 -- -- -- -- 77/46 -- --    06/10/24 1354 -- -- -- -- 93/65 -- --    06/10/24 1344 -- -- 90 -- 89/66 -- 100    06/10/24 1340 -- -- 87 -- 79/40 -- 100    06/10/24 1200 -- -- 118 -- 109/63 -- 89    06/10/24 1155 -- -- 105 -- 103/58 -- 99    06/10/24 1146 97.8 (36.6) Oral 118 20 116/90 Lying 99    06/10/24 1144 97.8 (36.6) Oral -- -- -- -- 99          Current Facility-Administered Medications   Medication Dose Route Frequency Provider Last Rate Last Admin    sennosides-docusate (PERICOLACE) 8.6-50 MG per tablet 2 tablet  2 tablet Oral BID Michael Moncada DO        And    polyethylene glycol (MIRALAX) packet 17 g  17 g Oral Daily PRN Michael Moncada DO        And    bisacodyl (DULCOLAX) EC tablet 5 mg  5 mg Oral Daily PRN Michael Moncada DO        And    bisacodyl (DULCOLAX) suppository 10 mg  10 mg Rectal Daily PRN Michael Moncada DO        Calcium Replacement - Follow Nurse / BPA Driven Protocol   Does not apply PRN Michael Moncada DO        cefTRIAXone (ROCEPHIN) 2,000 mg in sodium chloride 0.9 % 100 mL MBP  2,000 mg Intravenous Q12H Abu Ramone Bonilla,  mL/hr at 06/11/24 0336 2,000 mg at 06/11/24 0336    dexmedetomidine (PRECEDEX) 400 mcg in 100 mL NS infusion  0.2-1.5 mcg/kg/hr Intravenous Titrated Michael Moncada DO   Stopped at 06/10/24 2255    Enoxaparin Sodium (LOVENOX) syringe 40 mg  40 mg Subcutaneous Nightly Michael Moncada DO   40 mg at 06/10/24 2058    lactated ringers infusion  100 mL/hr Intravenous Continuous Michael Moncada  mL/hr at 06/11/24 0223 100 mL/hr at 06/11/24 0223    Magnesium Standard Dose Replacement - Follow Nurse / BPA Driven Protocol   Does not apply PRN Michael Moncada  Ronald,         nitroglycerin (NITROSTAT) SL tablet 0.4 mg  0.4 mg Sublingual Q5 Min PRN Michael Moncada, DO        Pharmacy to dose vancomycin   Does not apply Continuous PRN Michael Moncada DO        Phosphorus Replacement - Follow Nurse / BPA Driven Protocol   Does not apply PRN Michael Moncada,         Potassium Replacement - Follow Nurse / BPA Driven Protocol   Does not apply PRN Michael Moncada, DO        sodium chloride 0.9 % flush 10 mL  10 mL Intravenous Q12H Michael Moncada, DO   10 mL at 06/10/24 2059    sodium chloride 0.9 % flush 10 mL  10 mL Intravenous PRN Michael Moncada,         sodium chloride 0.9 % infusion 40 mL  40 mL Intravenous PRN Michael Moncada DO        Vancomycin HCl 1,250 mg in sodium chloride 0.9 % 250 mL VTB  1,250 mg Intravenous Q12H Ramone Guardado,  mL/hr at 06/11/24 0611 1,250 mg at 06/11/24 0611     Lab Results (all)       Procedure Component Value Units Date/Time    Phosphorus [[id number]]  (Normal) Collected: 06/11/24 0610    Specimen: Blood from Arm, Right Updated: 06/11/24 0700     Phosphorus 3.7 mg/dL     Magnesium [[id number]]  (Normal) Collected: 06/11/24 0610    Specimen: Blood from Arm, Right Updated: 06/11/24 0700     Magnesium 2.1 mg/dL     Basic Metabolic Panel [[id number]]  (Abnormal) Collected: 06/11/24 0610    Specimen: Blood from Arm, Right Updated: 06/11/24 0700     Glucose 102 mg/dL      BUN 8 mg/dL      Creatinine 0.70 mg/dL      Sodium 141 mmol/L      Potassium 4.0 mmol/L      Comment: Slight hemolysis detected by analyzer. Result may be falsely elevated.        Chloride 111 mmol/L      CO2 20.0 mmol/L      Calcium 8.0 mg/dL      BUN/Creatinine Ratio 11.4     Anion Gap 10.0 mmol/L      eGFR 120.2 mL/min/1.73     Narrative:      GFR Normal >60  Chronic Kidney Disease <60  Kidney Failure <15      Culture, CSF - Cerebrospinal Fluid, Lumbar Puncture [[id number]] Collected:  06/10/24 1504    Specimen: Cerebrospinal Fluid from Lumbar Puncture Updated: 06/11/24 0638     CSF Culture No growth     Gram Stain No WBCs or organisms seen    Narrative:      <1 ml of body fluid received in the laboratory.  Culture results may be compromised by inadequate volume.        CBC & Differential [[id number]]  (Abnormal) Collected: 06/11/24 0610    Specimen: Blood from Arm, Right Updated: 06/11/24 0637    Narrative:      The following orders were created for panel order CBC & Differential.  Procedure                               Abnormality         Status                     ---------                               -----------         ------                     CBC Auto Differential[[id number]]        Abnormal            Final result                 Please view results for these tests on the individual orders.    CBC Auto Differential [[id number]]  (Abnormal) Collected: 06/11/24 0610    Specimen: Blood from Arm, Right Updated: 06/11/24 0637     WBC 14.19 10*3/mm3      RBC 3.78 10*6/mm3      Hemoglobin 10.6 g/dL      Hematocrit 32.8 %      MCV 86.8 fL      MCH 28.0 pg      MCHC 32.3 g/dL      RDW 14.1 %      RDW-SD 45.0 fl      MPV 10.4 fL      Platelets 238 10*3/mm3      Neutrophil % 81.9 %      Lymphocyte % 10.7 %      Monocyte % 6.9 %      Eosinophil % 0.0 %      Basophil % 0.1 %      Immature Grans % 0.4 %      Neutrophils, Absolute 11.61 10*3/mm3      Lymphocytes, Absolute 1.52 10*3/mm3      Monocytes, Absolute 0.98 10*3/mm3      Eosinophils, Absolute 0.00 10*3/mm3      Basophils, Absolute 0.02 10*3/mm3      Immature Grans, Absolute 0.06 10*3/mm3      nRBC 0.0 /100 WBC     POC Glucose Once [[id number]]  (Normal) Collected: 06/11/24 0543    Specimen: Blood Updated: 06/11/24 0554     Glucose 94 mg/dL     POC Glucose Once [[id number]]  (Normal) Collected: 06/10/24 2332    Specimen: Blood Updated: 06/10/24 2333     Glucose 121 mg/dL     POC Glucose Once [[id number]]  (Normal) Collected: 06/10/24 3510     Specimen: Blood Updated: 06/10/24 2211     Glucose 106 mg/dL     MRSA Screen, PCR (Inpatient) - Swab, Nares [[id number]]  (Normal) Collected: 06/10/24 1858    Specimen: Swab from Nares Updated: 06/10/24 2019     MRSA PCR Negative    Narrative:      The negative predictive value of this diagnostic test is high and should only be used to consider de-escalating anti-MRSA therapy. A positive result may indicate colonization with MRSA and must be correlated clinically.  MRSA Negative    Ehrlichia Profile DNA PCR [[id number]] Collected: 06/10/24 1952    Specimen: Blood from Arm, Right Updated: 06/10/24 1956    Rickettsia Species DNA, Real-Time PCR [[id number]] Collected: 06/10/24 1952    Specimen: Blood from Arm, Right Updated: 06/10/24 1956    Lyme Disease Total Antibody With Reflex to Immunoassay [[id number]] Collected: 06/10/24 1811    Specimen: Blood Updated: 06/10/24 1811    Meningitis / Encephalitis Panel, PCR - Cerebrospinal Fluid, Lumbar Puncture [[id number]]  (Normal) Collected: 06/10/24 1504    Specimen: Cerebrospinal Fluid from Lumbar Puncture Updated: 06/10/24 1657     ESCHERICHIA COLI K1, PCR Not Detected     HAEMOPHILUS INFLUENZAE, PCR Not Detected     LISTERIA MONOCYTOGENES, PCR Not Detected     NEISSERIA MENINGITIDIS, PCR Not Detected     STREPTOCOCCUS AGALACTIAE, PCR Not Detected     STREPTOCOCCUS PNEUMONIAE, PCR Not Detected     CYTOMEGALOVIRUS (CMV), PCR Not Detected     ENTEROVIRUS, PCR Not Detected     HERPES SIMPLEX VIRUS 1 (HSV-1), PCR Not Detected     HERPES SIMPLEX VIRUS 2 (HSV-2), PCR Not Detected     HUMAN PARECHOVIRUS, PCR Not Detected     VARICELLA ZOSTER VIRUS (VZV), PCR Not Detected     CRYPTOCOCCUS NEOFORMANS / GATTII, PCR Not Detected     HUMAN HERPES VIRUS 6 PCR Not Detected    POC Glucose Once [[id number]]  (Normal) Collected: 06/10/24 1622    Specimen: Blood Updated: 06/10/24 1624     Glucose 110 mg/dL     Cell Count With Differential, CSF Use CSF Tube: 4 [[id number]] Collected: 06/10/24 1504     Specimen: Cerebrospinal Fluid from Lumbar Puncture Updated: 06/10/24 1557    Narrative:      The following orders were created for panel order Cell Count With Differential, CSF Use CSF Tube: 4.  Procedure                               Abnormality         Status                     ---------                               -----------         ------                     Cell Count, CSF - Cerebr...[[id number]]                      Final result                 Please view results for these tests on the individual orders.    Cell Count, CSF - Cerebrospinal Fluid, Lumbar Puncture [[id number]] Collected: 06/10/24 1504    Specimen: Cerebrospinal Fluid from Lumbar Puncture Updated: 06/10/24 1557     WBC, CSF 2 /mm3      RBC, CSF 2 /mm3      Color, CSF Colorless     Appearance, CSF Clear     Volume, CSF 3.5 mL      Tube Number, CSF 4    Narrative:      If the reference range(s) are not listed, then the reference range(s) have not been defined, so unavailable for the body fluid result.  Differential not indicated.    Fentanyl, Urine - Urine, Clean Catch [[id number]]  (Normal) Collected: 06/10/24 1414    Specimen: Urine, Clean Catch Updated: 06/10/24 1555     Fentanyl, Urine Negative    Narrative:      Negative Threshold:      Fentanyl 5 ng/mL     The normal value for the drug tested is negative. This report includes final unconfirmed screening results to be used for medical treatment purposes only. Unconfirmed results must not be used for non-medical purposes such as employment or legal testing. Clinical consideration should be applied to any drug of abuse test, particularly when unconfirmed results are used.           Protein, CSF - Cerebrospinal Fluid, Lumbar Puncture [[id number]]  (Normal) Collected: 06/10/24 1504    Specimen: Cerebrospinal Fluid from Lumbar Puncture Updated: 06/10/24 1547     Protein, Total (CSF) 31.4 mg/dL     Glucose, CSF - Cerebrospinal Fluid, Lumbar Puncture [[id number]]  (Normal) Collected: 06/10/24 1504     Specimen: Cerebrospinal Fluid from Lumbar Puncture Updated: 06/10/24 1547     Glucose, CSF 65 mg/dL     Herpes Simplex Virus (HSV) Types 1/2, Cerebrospinal Fluid (CSF), DNA PCR - Cerebrospinal Fluid, Lumbar Puncture [[id number]] Collected: 06/10/24 1504    Specimen: Cerebrospinal Fluid from Lumbar Puncture Updated: 06/10/24 1534    Anaerobic Culture - Cerebrospinal Fluid, Spine, Lumbar [[id number]] Collected: 06/10/24 1504    Specimen: Cerebrospinal Fluid from Spine, Lumbar Updated: 06/10/24 1533    CSF Tube - Cerebrospinal Fluid, Lumbar Puncture [[id number]] Collected: 06/10/24 1504    Specimen: Cerebrospinal Fluid from Lumbar Puncture Updated: 06/10/24 1531     Extra Tube hold    Urine Drug Screen - Urine, Clean Catch [[id number]]  (Abnormal) Collected: 06/10/24 1414    Specimen: Urine, Clean Catch Updated: 06/10/24 1432     THC, Screen, Urine Positive     Phencyclidine (PCP), Urine Negative     Cocaine Screen, Urine Negative     Methamphetamine, Ur Negative     Opiate Screen Negative     Amphetamine Screen, Urine Negative     Benzodiazepine Screen, Urine Positive     Tricyclic Antidepressants Screen Negative     Methadone Screen, Urine Negative     Barbiturates Screen, Urine Negative     Oxycodone Screen, Urine Negative     Buprenorphine, Screen, Urine Negative    Narrative:      Cutoff For Drugs Screened:    Amphetamines               500 ng/ml  Barbiturates               200 ng/ml  Benzodiazepines            150 ng/ml  Cocaine                    150 ng/ml  Methadone                  200 ng/ml  Opiates                    100 ng/ml  Phencyclidine               25 ng/ml  THC                         50 ng/ml  Methamphetamine            500 ng/ml  Tricyclic Antidepressants  300 ng/ml  Oxycodone                  100 ng/ml  Buprenorphine               10 ng/ml    The normal value for all drugs tested is negative. This report includes unconfirmed screening results, with the cutoff values listed, to be used for medical  treatment purposes only.  Unconfirmed results must not be used for non-medical purposes such as employment or legal testing.  Clinical consideration should be applied to any drug of abuse test, particularly when unconfirmed results are used.      Pregnancy, Urine - Urine, Clean Catch [[id number]]  (Normal) Collected: 06/10/24 1414    Specimen: Urine, Clean Catch Updated: 06/10/24 1430     HCG, Urine QL Negative    Urinalysis With Culture If Indicated - Urine, Catheter [[id number]]  (Abnormal) Collected: 06/10/24 1413    Specimen: Urine, Catheter Updated: 06/10/24 1427     Color, UA Yellow     Appearance, UA Clear     pH, UA 5.5     Specific Gravity, UA 1.016     Glucose, UA Negative     Ketones, UA Trace     Bilirubin, UA Negative     Blood, UA Trace     Protein, UA Trace     Leuk Esterase, UA Negative     Nitrite, UA Negative     Urobilinogen, UA 0.2 E.U./dL    Narrative:      In absence of clinical symptoms, the presence of pyuria, bacteria, and/or nitrites on the urinalysis result does not correlate with infection.    Urinalysis, Microscopic Only - Urine, Catheter [[id number]] Collected: 06/10/24 1413    Specimen: Urine, Catheter Updated: 06/10/24 1427     RBC, UA 0-2 /HPF      WBC, UA 0-2 /HPF      Comment: Urine culture not indicated.        Bacteria, UA None Seen /HPF      Squamous Epithelial Cells, UA 0-2 /HPF      Hyaline Casts, UA 0-6 /LPF      Methodology Automated Microscopy    Comprehensive Metabolic Panel [[id number]]  (Abnormal) Collected: 06/10/24 1316    Specimen: Blood Updated: 06/10/24 1349     Glucose 109 mg/dL      BUN 8 mg/dL      Creatinine 0.74 mg/dL      Sodium 139 mmol/L      Potassium 3.9 mmol/L      Comment: Slight hemolysis detected by analyzer. Result may be falsely elevated.        Chloride 108 mmol/L      CO2 18.0 mmol/L      Calcium 8.1 mg/dL      Total Protein 6.2 g/dL      Albumin 3.5 g/dL      ALT (SGPT) 18 U/L      AST (SGOT) 25 U/L      Alkaline Phosphatase 64 U/L      Total  Bilirubin 0.2 mg/dL      Globulin 2.7 gm/dL      Comment: Calculated Result        A/G Ratio 1.3 g/dL      BUN/Creatinine Ratio 10.8     Anion Gap 13.0 mmol/L      eGFR 112.5 mL/min/1.73     Narrative:      GFR Normal >60  Chronic Kidney Disease <60  Kidney Failure <15      Ethanol [[id number]]  (Normal) Collected: 06/10/24 1316    Specimen: Blood Updated: 06/10/24 1349     Ethanol <10 mg/dL     Narrative:      Elevated lactic acid concentration and lactate dehydrogenase(LD) activity may falsely elevate enzymatically determined ethanol levels. Not for legal purposes.     CBC & Differential [[id number]]  (Abnormal) Collected: 06/10/24 1316    Specimen: Blood Updated: 06/10/24 1331    Narrative:      The following orders were created for panel order CBC & Differential.  Procedure                               Abnormality         Status                     ---------                               -----------         ------                     CBC Auto Differential[[id number]]        Abnormal            Final result                 Please view results for these tests on the individual orders.    CBC Auto Differential [[id number]]  (Abnormal) Collected: 06/10/24 1316    Specimen: Blood Updated: 06/10/24 1331     WBC 12.13 10*3/mm3      RBC 4.15 10*6/mm3      Hemoglobin 11.7 g/dL      Hematocrit 37.2 %      MCV 89.6 fL      MCH 28.2 pg      MCHC 31.5 g/dL      RDW 13.8 %      RDW-SD 45.1 fl      MPV 10.3 fL      Platelets 228 10*3/mm3      Neutrophil % 87.9 %      Lymphocyte % 7.1 %      Monocyte % 4.1 %      Eosinophil % 0.2 %      Basophil % 0.2 %      Immature Grans % 0.5 %      Neutrophils, Absolute 10.67 10*3/mm3      Lymphocytes, Absolute 0.86 10*3/mm3      Monocytes, Absolute 0.50 10*3/mm3      Eosinophils, Absolute 0.02 10*3/mm3      Basophils, Absolute 0.02 10*3/mm3      Immature Grans, Absolute 0.06 10*3/mm3      nRBC 0.0 /100 WBC     POC Glucose Once [[id number]]  (Normal) Collected: 06/10/24 1242    Specimen: Blood  Updated: 06/10/24 1243     Glucose 130 mg/dL           Imaging Results (All)       Procedure Component Value Units Date/Time    XR Chest 1 View [[id number]] Collected: 06/10/24 1833     Updated: 06/10/24 1838    Narrative:      XR ABDOMEN KUB, XR CHEST 1 VW    Date of Exam: 6/10/2024 5:29 PM EDT    Indication: MRI clearence    Comparison: CT abdomen pelvis 11/8/2022    Findings:  Asymmetric patchy left lower lobe opacities. No edema, large effusion or pneumothorax. Heart size normal. Osseous structures grossly unremarkable. No visualized metal.    No dilated loops of bowel to suggest ileus or obstruction. Mild formed colonic stool. Osseous structures unremarkable. No visible metal.      Impression:      Impression:  1. No visible metal in the chest or abdomen.  2. Asymmetric patchy left lower lobe opacities. Differential includes atelectasis, aspiration and developing bronchopneumonia.  3. Nonobstructive bowel gas pattern.      Electronically Signed: Ramone Rojas MD    6/10/2024 6:35 PM EDT    Workstation ID: [id number]    XR Abdomen KUB [[id number]] Collected: 06/10/24 1833     Updated: 06/10/24 1838    Narrative:      XR ABDOMEN KUB, XR CHEST 1 VW    Date of Exam: 6/10/2024 5:29 PM EDT    Indication: MRI clearence    Comparison: CT abdomen pelvis 11/8/2022    Findings:  Asymmetric patchy left lower lobe opacities. No edema, large effusion or pneumothorax. Heart size normal. Osseous structures grossly unremarkable. No visualized metal.    No dilated loops of bowel to suggest ileus or obstruction. Mild formed colonic stool. Osseous structures unremarkable. No visible metal.      Impression:      Impression:  1. No visible metal in the chest or abdomen.  2. Asymmetric patchy left lower lobe opacities. Differential includes atelectasis, aspiration and developing bronchopneumonia.  3. Nonobstructive bowel gas pattern.      Electronically Signed: Ramone Rojas MD    6/10/2024 6:35 PM EDT    Workstation ID: [id number]     CT Head Without Contrast [[id number]] Collected: 06/10/24 1408     Updated: 06/10/24 1414    Narrative:      CT HEAD WO CONTRAST    Date of Exam: 6/10/2024 1:56 PM EDT    Indication: ams.    Comparison: None available.    Technique: Axial CT images were obtained of the head without contrast administration.  Automated exposure control and iterative construction methods were used.      Findings:  The calvarium appears intact. Included paranasal sinuses and mastoids appear clear. No gross abnormalities are seen in the orbits. The brain appears within normal limits. There is no evidence of hemorrhage, contusion, or edema, no evidence of mass or   mass effect, hydrocephalus, or abnormal extra-axial collection. No focal or generalized encephalomalacia is seen.      Impression:      Impression:  No evidence of acute intracranial disease.        Electronically Signed: Eric Estrella MD    6/10/2024 2:11 PM EDT    Workstation ID: [id number]          ECG/EMG Results (all)       None          Physician Progress Notes (all)    No notes of this type exist for this encounter.       Consult Notes (all)    No notes of this type exist for this encounter.

## 2024-06-11 NOTE — CASE MANAGEMENT/SOCIAL WORK
Discharge Planning Assessment  [facility]     Patient Name: Melani Bhatt  MRN: [id number]  Today's Date: 6/11/2024    Admit Date: 6/10/2024    Plan: Home   Discharge Needs Assessment       Row Name 06/11/24 1129       Living Environment    People in Home alone    Current Living Arrangements apartment    Potentially Unsafe Housing Conditions unable to assess    In the past 12 months has the electric, gas, oil, or water company threatened to shut off services in your home? No    Primary Care Provided by self    Provides Primary Care For no one    Family Caregiver if Needed parent(s)    Family Caregiver Names Katt and Basilio Bhatt    Quality of Family Relationships helpful;involved;supportive    Able to Return to Prior Arrangements yes       Resource/Environmental Concerns    Resource/Environmental Concerns none    Transportation Concerns none       Transportation Needs    In the past 12 months, has lack of transportation kept you from medical appointments or from getting medications? no    In the past 12 months, has lack of transportation kept you from meetings, work, or from getting things needed for daily living? No       Food Insecurity    Within the past 12 months, you worried that your food would run out before you got the money to buy more. Never true    Within the past 12 months, the food you bought just didn't last and you didn't have money to get more. Never true       Transition Planning    Patient/Family Anticipates Transition to home    Patient/Family Anticipated Services at Transition     Transportation Anticipated family or friend will provide       Discharge Needs Assessment    Equipment Currently Used at Home none    Concerns to be Addressed discharge planning                   Discharge Plan       Row Name 06/11/24 1132       Plan    Plan Home    Patient/Family in Agreement with Plan yes    Plan Comments Spoke with patient and her parents at bedside to initiate discharge planning.   Patient resides in [facility]; has no PCP; insurance is [facility] [employer].  Patient states she is independent with ADLs and mobility; no DME or HH.  Patient requested PCP appointment be arranged with Evonne Myers, but she is not taking new patients.  PCP appointment made with Carmen Lozano at [facility] for Monday June 17 at 1100 and patient is agreeable.  Patient denies other needs.  Family will transport home.  CM will continue to follow.    Final Discharge Disposition Code 01 - home or self-care                  Continued Care and Services - Admitted Since 6/10/2024    No active coordination exists for this encounter.       Expected Discharge Date and Time       Expected Discharge Date Expected Discharge Time    Jun 14, 2024            Demographic Summary       Row Name 06/11/24 1014       General Information    Referral Source admission list    Reason for Consult discharge planning    Preferred Language English       Contact Information    Permission Granted to Share Info With                    Functional Status       Row Name 06/11/24 1129       Functional Status    Usual Activity Tolerance good       Physical Activity    On average, how many days per week do you engage in moderate to strenuous exercise (like a brisk walk)? 0 days    On average, how many minutes do you engage in exercise at this level? 0 min    Number of minutes of exercise per week 0       Functional Status, IADL    Medications independent    Meal Preparation independent    Housekeeping independent    Laundry independent    Shopping independent                   Psychosocial    No documentation.                  Abuse/Neglect    No documentation.                  Legal    No documentation.                  Substance Abuse    No documentation.                  Patient Forms    No documentation.                     Quyen Clay RN

## 2024-06-11 NOTE — CONSULTS
Neurology    Referring provider:   Gabe Lopez MD  [address] [place] [address]    Reason for Consultation: Multiple seizures    Chief complaint: Seizures    History of present illness: 29-year-old woman seen for Dr. Walton for evaluation of seizures.    Yesterday morning she slumped to the floor and then developed clonic tonic activity lasting several minutes with postictal confusion.    She was brought to the emergency room and was awake enough to converse with Dr. Starr before she had another grand mal seizure which was witnessed by the physician.    She became extremely agitated postictally requiring sedation with Valium and Geodon.    She had a lumbar puncture in the ER CAT scan.    The patient has had some abdominal malaise over the last month.  She has had no alteration in consciousness associated with this.    She has never had a seizure before.    She does not use recreational drugs although there is THC screen is positive.    She is on Zoloft for depression and has been tapering the dose.    She is no focal numbness weakness speech difficulty.  She denies palpitations or other health issues.        Review of Systems: She does have herpes genitalis.    She works full-time and is in general good health.    Home meds:   Medications Prior to Admission   Medication Sig Dispense Refill Last Dose    norgestimate-ethinyl estradiol (ORTHO-CYCLEN) 0.25-35 MG-MCG per tablet Take 1 tablet by mouth Daily. 28 tablet 0 6/9/2024    sertraline (ZOLOFT) 50 MG tablet Take 1 tablet by mouth Daily.   6/9/2024    clobetasol (TEMOVATE) 0.05 % cream Apply 1 application topically to the appropriate area as directed 2 (Two) Times a Day. 30 g 0 Unknown       History  Past Medical History:   Diagnosis Date    Acne     Allergic     Depression     Genital herpes     Heart murmur     as a child    Herpes genitalis     Irregular menses     Pap smear for cervical cancer screening 08/2016    Vegan diet    ,   Past  "Surgical History:   Procedure Laterality Date    WISDOM TOOTH EXTRACTION      x4   ,   Family History   Problem Relation Age of Onset    Breast cancer Mother     Arthritis Father     Hypertension Father     Arthritis Maternal Grandmother     Breast cancer Maternal Grandmother     Hypertension Maternal Grandmother     Hypertension Maternal Grandfather     Arthritis Paternal Grandmother     Ovarian cancer Paternal Grandmother     Breast cancer Paternal Grandmother     Heart attack Paternal Grandmother     Hypertension Paternal Grandmother     Hypertension Paternal Grandfather     Stroke Paternal Grandfather     Kidney cancer Other    ,   Social History     Tobacco Use    Smoking status: Never    Smokeless tobacco: Never   Vaping Use    Vaping status: Never Used   Substance Use Topics    Alcohol use: Yes     Alcohol/week: 0.0 - 7.0 standard drinks of alcohol    Drug use: No    and Allergies:  Patient has no known allergies.,    Vital Signs   Blood pressure 111/77, pulse 82, temperature 98.1 °F (36.7 °C), temperature source Oral, resp. rate 16, height 177.8 cm (70\"), weight 80.5 kg (177 lb 7.5 oz), SpO2 99%, not currently breastfeeding.  Body mass index is 25.46 kg/m².    Physical Exam:   General: Pleasant white female in no distress              Head: No trauma              Neck: No bruits              Resp: Normal breath sound              Cor: Regular rhythm              Extremities: Warm and dry              Skin: Patient is awake and alert she is oriented to person place and time.    Speech is articulate with no word finding problems.    Coordination is normal finger-nose testing bilaterally.    Cranial nerves show equal pupils and full eye movements.  She has no ptosis or weakness of eye closure.    Facial sensation is normal and full visual fields are full.    Tongue protrudes normally.  She has a small bite on her lip.    Reflexes are 1+ and equal bilaterally.    Motor testing is normal with no decrease in  " and antigravity movements present in both legs.    Sensory testing is normal.      Results Review: Spinal fluid shows 2 white cells with a negative meningeal encephalitis panel and a protein level 31.4.  CSF glucose is 65.    CT head was personally reviewed and is normal    Labs:  Lab Results (last 72 hours)       Procedure Component Value Units Date/Time    Phosphorus [[id number]]  (Normal) Collected: 06/11/24 0610    Specimen: Blood from Arm, Right Updated: 06/11/24 0700     Phosphorus 3.7 mg/dL     Magnesium [[id number]]  (Normal) Collected: 06/11/24 0610    Specimen: Blood from Arm, Right Updated: 06/11/24 0700     Magnesium 2.1 mg/dL     Basic Metabolic Panel [[id number]]  (Abnormal) Collected: 06/11/24 0610    Specimen: Blood from Arm, Right Updated: 06/11/24 0700     Glucose 102 mg/dL      BUN 8 mg/dL      Creatinine 0.70 mg/dL      Sodium 141 mmol/L      Potassium 4.0 mmol/L      Comment: Slight hemolysis detected by analyzer. Result may be falsely elevated.        Chloride 111 mmol/L      CO2 20.0 mmol/L      Calcium 8.0 mg/dL      BUN/Creatinine Ratio 11.4     Anion Gap 10.0 mmol/L      eGFR 120.2 mL/min/1.73     Narrative:      GFR Normal >60  Chronic Kidney Disease <60  Kidney Failure <15      Culture, CSF - Cerebrospinal Fluid, Lumbar Puncture [[id number]] Collected: 06/10/24 1504    Specimen: Cerebrospinal Fluid from Lumbar Puncture Updated: 06/11/24 0638     CSF Culture No growth     Gram Stain No WBCs or organisms seen    Narrative:      <1 ml of body fluid received in the laboratory.  Culture results may be compromised by inadequate volume.        CBC & Differential [[id number]]  (Abnormal) Collected: 06/11/24 0610    Specimen: Blood from Arm, Right Updated: 06/11/24 0637    Narrative:      The following orders were created for panel order CBC & Differential.  Procedure                               Abnormality         Status                     ---------                               -----------          ------                     CBC Auto Differential[[id number]]        Abnormal            Final result                 Please view results for these tests on the individual orders.    CBC Auto Differential [[id number]]  (Abnormal) Collected: 06/11/24 0610    Specimen: Blood from Arm, Right Updated: 06/11/24 0637     WBC 14.19 10*3/mm3      RBC 3.78 10*6/mm3      Hemoglobin 10.6 g/dL      Hematocrit 32.8 %      MCV 86.8 fL      MCH 28.0 pg      MCHC 32.3 g/dL      RDW 14.1 %      RDW-SD 45.0 fl      MPV 10.4 fL      Platelets 238 10*3/mm3      Neutrophil % 81.9 %      Lymphocyte % 10.7 %      Monocyte % 6.9 %      Eosinophil % 0.0 %      Basophil % 0.1 %      Immature Grans % 0.4 %      Neutrophils, Absolute 11.61 10*3/mm3      Lymphocytes, Absolute 1.52 10*3/mm3      Monocytes, Absolute 0.98 10*3/mm3      Eosinophils, Absolute 0.00 10*3/mm3      Basophils, Absolute 0.02 10*3/mm3      Immature Grans, Absolute 0.06 10*3/mm3      nRBC 0.0 /100 WBC     POC Glucose Once [[id number]]  (Normal) Collected: 06/11/24 0543    Specimen: Blood Updated: 06/11/24 0554     Glucose 94 mg/dL     POC Glucose Once [[id number]]  (Normal) Collected: 06/10/24 2332    Specimen: Blood Updated: 06/10/24 2333     Glucose 121 mg/dL     POC Glucose Once [[id number]]  (Normal) Collected: 06/10/24 1736    Specimen: Blood Updated: 06/10/24 2211     Glucose 106 mg/dL     MRSA Screen, PCR (Inpatient) - Swab, Nares [[id number]]  (Normal) Collected: 06/10/24 1858    Specimen: Swab from Nares Updated: 06/10/24 2019     MRSA PCR Negative    Narrative:      The negative predictive value of this diagnostic test is high and should only be used to consider de-escalating anti-MRSA therapy. A positive result may indicate colonization with MRSA and must be correlated clinically.  MRSA Negative    Ehrlichia Profile DNA PCR [[id number]] Collected: 06/10/24 1952    Specimen: Blood from Arm, Right Updated: 06/10/24 1956    Rickettsia Species DNA, Real-Time PCR [[id number]]  Collected: 06/10/24 1952    Specimen: Blood from Arm, Right Updated: 06/10/24 1956    Lyme Disease Total Antibody With Reflex to Immunoassay [[id number]] Collected: 06/10/24 1811    Specimen: Blood Updated: 06/10/24 1811    Meningitis / Encephalitis Panel, PCR - Cerebrospinal Fluid, Lumbar Puncture [[id number]]  (Normal) Collected: 06/10/24 1504    Specimen: Cerebrospinal Fluid from Lumbar Puncture Updated: 06/10/24 1657     ESCHERICHIA COLI K1, PCR Not Detected     HAEMOPHILUS INFLUENZAE, PCR Not Detected     LISTERIA MONOCYTOGENES, PCR Not Detected     NEISSERIA MENINGITIDIS, PCR Not Detected     STREPTOCOCCUS AGALACTIAE, PCR Not Detected     STREPTOCOCCUS PNEUMONIAE, PCR Not Detected     CYTOMEGALOVIRUS (CMV), PCR Not Detected     ENTEROVIRUS, PCR Not Detected     HERPES SIMPLEX VIRUS 1 (HSV-1), PCR Not Detected     HERPES SIMPLEX VIRUS 2 (HSV-2), PCR Not Detected     HUMAN PARECHOVIRUS, PCR Not Detected     VARICELLA ZOSTER VIRUS (VZV), PCR Not Detected     CRYPTOCOCCUS NEOFORMANS / GATTII, PCR Not Detected     HUMAN HERPES VIRUS 6 PCR Not Detected    POC Glucose Once [[id number]]  (Normal) Collected: 06/10/24 1622    Specimen: Blood Updated: 06/10/24 1624     Glucose 110 mg/dL     Cell Count With Differential, CSF Use CSF Tube: 4 [[id number]] Collected: 06/10/24 1504    Specimen: Cerebrospinal Fluid from Lumbar Puncture Updated: 06/10/24 1557    Narrative:      The following orders were created for panel order Cell Count With Differential, CSF Use CSF Tube: 4.  Procedure                               Abnormality         Status                     ---------                               -----------         ------                     Cell Count, CSF - Cerebr...[[id number]]                      Final result                 Please view results for these tests on the individual orders.    Cell Count, CSF - Cerebrospinal Fluid, Lumbar Puncture [[id number]] Collected: 06/10/24 1504    Specimen: Cerebrospinal Fluid from  Lumbar Puncture Updated: 06/10/24 1557     WBC, CSF 2 /mm3      RBC, CSF 2 /mm3      Color, CSF Colorless     Appearance, CSF Clear     Volume, CSF 3.5 mL      Tube Number, CSF 4    Narrative:      If the reference range(s) are not listed, then the reference range(s) have not been defined, so unavailable for the body fluid result.  Differential not indicated.    Fentanyl, Urine - Urine, Clean Catch [[id number]]  (Normal) Collected: 06/10/24 1414    Specimen: Urine, Clean Catch Updated: 06/10/24 1555     Fentanyl, Urine Negative    Narrative:      Negative Threshold:      Fentanyl 5 ng/mL     The normal value for the drug tested is negative. This report includes final unconfirmed screening results to be used for medical treatment purposes only. Unconfirmed results must not be used for non-medical purposes such as employment or legal testing. Clinical consideration should be applied to any drug of abuse test, particularly when unconfirmed results are used.           Protein, CSF - Cerebrospinal Fluid, Lumbar Puncture [[id number]]  (Normal) Collected: 06/10/24 1504    Specimen: Cerebrospinal Fluid from Lumbar Puncture Updated: 06/10/24 1547     Protein, Total (CSF) 31.4 mg/dL     Glucose, CSF - Cerebrospinal Fluid, Lumbar Puncture [[id number]]  (Normal) Collected: 06/10/24 1504    Specimen: Cerebrospinal Fluid from Lumbar Puncture Updated: 06/10/24 1547     Glucose, CSF 65 mg/dL     Herpes Simplex Virus (HSV) Types 1/2, Cerebrospinal Fluid (CSF), DNA PCR - Cerebrospinal Fluid, Lumbar Puncture [[id number]] Collected: 06/10/24 1504    Specimen: Cerebrospinal Fluid from Lumbar Puncture Updated: 06/10/24 1534    Anaerobic Culture - Cerebrospinal Fluid, Spine, Lumbar [[id number]] Collected: 06/10/24 1504    Specimen: Cerebrospinal Fluid from Spine, Lumbar Updated: 06/10/24 1533    CSF Tube - Cerebrospinal Fluid, Lumbar Puncture [[id number]] Collected: 06/10/24 1504    Specimen: Cerebrospinal Fluid from Lumbar Puncture Updated:  06/10/24 1531     Extra Tube hold    Urine Drug Screen - Urine, Clean Catch [[id number]]  (Abnormal) Collected: 06/10/24 1414    Specimen: Urine, Clean Catch Updated: 06/10/24 1432     THC, Screen, Urine Positive     Phencyclidine (PCP), Urine Negative     Cocaine Screen, Urine Negative     Methamphetamine, Ur Negative     Opiate Screen Negative     Amphetamine Screen, Urine Negative     Benzodiazepine Screen, Urine Positive     Tricyclic Antidepressants Screen Negative     Methadone Screen, Urine Negative     Barbiturates Screen, Urine Negative     Oxycodone Screen, Urine Negative     Buprenorphine, Screen, Urine Negative    Narrative:      Cutoff For Drugs Screened:    Amphetamines               500 ng/ml  Barbiturates               200 ng/ml  Benzodiazepines            150 ng/ml  Cocaine                    150 ng/ml  Methadone                  200 ng/ml  Opiates                    100 ng/ml  Phencyclidine               25 ng/ml  THC                         50 ng/ml  Methamphetamine            500 ng/ml  Tricyclic Antidepressants  300 ng/ml  Oxycodone                  100 ng/ml  Buprenorphine               10 ng/ml    The normal value for all drugs tested is negative. This report includes unconfirmed screening results, with the cutoff values listed, to be used for medical treatment purposes only.  Unconfirmed results must not be used for non-medical purposes such as employment or legal testing.  Clinical consideration should be applied to any drug of abuse test, particularly when unconfirmed results are used.      Pregnancy, Urine - Urine, Clean Catch [[id number]]  (Normal) Collected: 06/10/24 1414    Specimen: Urine, Clean Catch Updated: 06/10/24 1430     HCG, Urine QL Negative    Urinalysis With Culture If Indicated - Urine, Catheter [[id number]]  (Abnormal) Collected: 06/10/24 1413    Specimen: Urine, Catheter Updated: 06/10/24 1427     Color, UA Yellow     Appearance, UA Clear     pH, UA 5.5     Specific Gravity,  UA 1.016     Glucose, UA Negative     Ketones, UA Trace     Bilirubin, UA Negative     Blood, UA Trace     Protein, UA Trace     Leuk Esterase, UA Negative     Nitrite, UA Negative     Urobilinogen, UA 0.2 E.U./dL    Narrative:      In absence of clinical symptoms, the presence of pyuria, bacteria, and/or nitrites on the urinalysis result does not correlate with infection.    Urinalysis, Microscopic Only - Urine, Catheter [[id number]] Collected: 06/10/24 1413    Specimen: Urine, Catheter Updated: 06/10/24 1427     RBC, UA 0-2 /HPF      WBC, UA 0-2 /HPF      Comment: Urine culture not indicated.        Bacteria, UA None Seen /HPF      Squamous Epithelial Cells, UA 0-2 /HPF      Hyaline Casts, UA 0-6 /LPF      Methodology Automated Microscopy    Comprehensive Metabolic Panel [[id number]]  (Abnormal) Collected: 06/10/24 1316    Specimen: Blood Updated: 06/10/24 1349     Glucose 109 mg/dL      BUN 8 mg/dL      Creatinine 0.74 mg/dL      Sodium 139 mmol/L      Potassium 3.9 mmol/L      Comment: Slight hemolysis detected by analyzer. Result may be falsely elevated.        Chloride 108 mmol/L      CO2 18.0 mmol/L      Calcium 8.1 mg/dL      Total Protein 6.2 g/dL      Albumin 3.5 g/dL      ALT (SGPT) 18 U/L      AST (SGOT) 25 U/L      Alkaline Phosphatase 64 U/L      Total Bilirubin 0.2 mg/dL      Globulin 2.7 gm/dL      Comment: Calculated Result        A/G Ratio 1.3 g/dL      BUN/Creatinine Ratio 10.8     Anion Gap 13.0 mmol/L      eGFR 112.5 mL/min/1.73     Narrative:      GFR Normal >60  Chronic Kidney Disease <60  Kidney Failure <15      Ethanol [[id number]]  (Normal) Collected: 06/10/24 1316    Specimen: Blood Updated: 06/10/24 1349     Ethanol <10 mg/dL     Narrative:      Elevated lactic acid concentration and lactate dehydrogenase(LD) activity may falsely elevate enzymatically determined ethanol levels. Not for legal purposes.     CBC & Differential [[id number]]  (Abnormal) Collected: 06/10/24 1316    Specimen: Blood  Updated: 06/10/24 1331    Narrative:      The following orders were created for panel order CBC & Differential.  Procedure                               Abnormality         Status                     ---------                               -----------         ------                     CBC Auto Differential[[id number]]        Abnormal            Final result                 Please view results for these tests on the individual orders.    CBC Auto Differential [[id number]]  (Abnormal) Collected: 06/10/24 1316    Specimen: Blood Updated: 06/10/24 1331     WBC 12.13 10*3/mm3      RBC 4.15 10*6/mm3      Hemoglobin 11.7 g/dL      Hematocrit 37.2 %      MCV 89.6 fL      MCH 28.2 pg      MCHC 31.5 g/dL      RDW 13.8 %      RDW-SD 45.1 fl      MPV 10.3 fL      Platelets 228 10*3/mm3      Neutrophil % 87.9 %      Lymphocyte % 7.1 %      Monocyte % 4.1 %      Eosinophil % 0.2 %      Basophil % 0.2 %      Immature Grans % 0.5 %      Neutrophils, Absolute 10.67 10*3/mm3      Lymphocytes, Absolute 0.86 10*3/mm3      Monocytes, Absolute 0.50 10*3/mm3      Eosinophils, Absolute 0.02 10*3/mm3      Basophils, Absolute 0.02 10*3/mm3      Immature Grans, Absolute 0.06 10*3/mm3      nRBC 0.0 /100 WBC     POC Glucose Once [[id number]]  (Normal) Collected: 06/10/24 1242    Specimen: Blood Updated: 06/10/24 1243     Glucose 130 mg/dL             Rads:  Imaging Results (Last 72 Hours)       Procedure Component Value Units Date/Time    XR Chest 1 View [[id number]] Collected: 06/10/24 1833     Updated: 06/10/24 1838    Narrative:      XR ABDOMEN KUB, XR CHEST 1 VW    Date of Exam: 6/10/2024 5:29 PM EDT    Indication: MRI clearence    Comparison: CT abdomen pelvis 11/8/2022    Findings:  Asymmetric patchy left lower lobe opacities. No edema, large effusion or pneumothorax. Heart size normal. Osseous structures grossly unremarkable. No visualized metal.    No dilated loops of bowel to suggest ileus or obstruction. Mild formed colonic stool.  Osseous structures unremarkable. No visible metal.      Impression:      Impression:  1. No visible metal in the chest or abdomen.  2. Asymmetric patchy left lower lobe opacities. Differential includes atelectasis, aspiration and developing bronchopneumonia.  3. Nonobstructive bowel gas pattern.      Electronically Signed: Ramone Rojas MD    6/10/2024 6:35 PM EDT    Workstation ID: [id number]    XR Abdomen KUB [[id number]] Collected: 06/10/24 1833     Updated: 06/10/24 1838    Narrative:      XR ABDOMEN KUB, XR CHEST 1 VW    Date of Exam: 6/10/2024 5:29 PM EDT    Indication: MRI clearence    Comparison: CT abdomen pelvis 11/8/2022    Findings:  Asymmetric patchy left lower lobe opacities. No edema, large effusion or pneumothorax. Heart size normal. Osseous structures grossly unremarkable. No visualized metal.    No dilated loops of bowel to suggest ileus or obstruction. Mild formed colonic stool. Osseous structures unremarkable. No visible metal.      Impression:      Impression:  1. No visible metal in the chest or abdomen.  2. Asymmetric patchy left lower lobe opacities. Differential includes atelectasis, aspiration and developing bronchopneumonia.  3. Nonobstructive bowel gas pattern.      Electronically Signed: Ramone Rojas MD    6/10/2024 6:35 PM EDT    Workstation ID: [id number]    CT Head Without Contrast [[id number]] Collected: 06/10/24 1408     Updated: 06/10/24 1414    Narrative:      CT HEAD WO CONTRAST    Date of Exam: 6/10/2024 1:56 PM EDT    Indication: ams.    Comparison: None available.    Technique: Axial CT images were obtained of the head without contrast administration.  Automated exposure control and iterative construction methods were used.      Findings:  The calvarium appears intact. Included paranasal sinuses and mastoids appear clear. No gross abnormalities are seen in the orbits. The brain appears within normal limits. There is no evidence of hemorrhage, contusion, or edema, no evidence of  mass or   mass effect, hydrocephalus, or abnormal extra-axial collection. No focal or generalized encephalomalacia is seen.      Impression:      Impression:  No evidence of acute intracranial disease.        Electronically Signed: Eric Estrella MD    6/10/2024 2:11 PM EDT    Workstation ID: [id number]              Assessment: New onset partial complex epilepsy.           Plan:    Discontinue antibiotics.    Keppra 500 mg p.o. twice daily.    Activities as tolerated.    Review MRI scan.    Monitor overnight and likely discharge in the morning.    Patient follow-up Dr. Hurts in 1 month.        Comment:   Patient is advised on the state law regarding driving does not operate a motor vehicle for 3 months from the time of her last seizure.    She is also advised about daily activities that can be detrimental to your health such as bathtubs ladders hiking alone swimming etc.    I discussed the patients findings and my recommendations with patient, family, nursing staff, and primary care team    Addendum: MRI was personally reviewed and is normal without infusion.  No focal lesions hippocampal atrophy or temporal lobe changes are noted.      Gabe Lopez MD  06/11/24  11:29 EDT

## 2024-06-12 ENCOUNTER — TELEPHONE (OUTPATIENT)
Dept: NEUROLOGY | Facility: CLINIC | Age: 29
End: 2024-06-12
Payer: MEDICAID

## 2024-06-12 ENCOUNTER — READMISSION MANAGEMENT (OUTPATIENT)
Dept: CALL CENTER | Facility: HOSPITAL | Age: 29
End: 2024-06-12
Payer: MEDICAID

## 2024-06-12 VITALS
SYSTOLIC BLOOD PRESSURE: 111 MMHG | RESPIRATION RATE: 15 BRPM | BODY MASS INDEX: 26.1 KG/M2 | HEART RATE: 98 BPM | HEIGHT: 70 IN | WEIGHT: 182.32 LBS | DIASTOLIC BLOOD PRESSURE: 83 MMHG | OXYGEN SATURATION: 100 % | TEMPERATURE: 98.2 F

## 2024-06-12 LAB
ANION GAP SERPL CALCULATED.3IONS-SCNC: 9 MMOL/L (ref 5–15)
B BURGDOR IGG+IGM SER QL IA: NEGATIVE
BUN SERPL-MCNC: 6 MG/DL (ref 6–20)
BUN/CREAT SERPL: 9.5 (ref 7–25)
CALCIUM SPEC-SCNC: 8.5 MG/DL (ref 8.6–10.5)
CHLORIDE SERPL-SCNC: 108 MMOL/L (ref 98–107)
CO2 SERPL-SCNC: 25 MMOL/L (ref 22–29)
CREAT SERPL-MCNC: 0.63 MG/DL (ref 0.57–1)
EGFRCR SERPLBLD CKD-EPI 2021: 123.3 ML/MIN/1.73
GLUCOSE SERPL-MCNC: 95 MG/DL (ref 65–99)
HSV1 DNA CSF QL NAA+PROBE: NEGATIVE
HSV2 DNA CSF QL NAA+PROBE: NEGATIVE
POTASSIUM SERPL-SCNC: 3.9 MMOL/L (ref 3.5–5.2)
SODIUM SERPL-SCNC: 142 MMOL/L (ref 136–145)

## 2024-06-12 PROCEDURE — 99239 HOSP IP/OBS DSCHRG MGMT >30: CPT | Performed by: INTERNAL MEDICINE

## 2024-06-12 PROCEDURE — 99232 SBSQ HOSP IP/OBS MODERATE 35: CPT | Performed by: PSYCHIATRY & NEUROLOGY

## 2024-06-12 PROCEDURE — 80048 BASIC METABOLIC PNL TOTAL CA: CPT

## 2024-06-12 RX ORDER — LEVETIRACETAM 500 MG/1
500 TABLET ORAL EVERY 12 HOURS SCHEDULED
Qty: 60 TABLET | Refills: 1 | Status: SHIPPED | OUTPATIENT
Start: 2024-06-12

## 2024-06-12 RX ADMIN — LEVETIRACETAM 500 MG: 500 TABLET, FILM COATED ORAL at 08:47

## 2024-06-12 RX ADMIN — Medication 10 ML: at 08:48

## 2024-06-12 NOTE — DISCHARGE SUMMARY
[facility]   DISCHARGE SUMMARY    Patient Name: Melani Bhatt  : 1995  MRN: [id number]    Date of Admission: 6/10/2024  Date of Discharge:  2024  Primary Care Physician: Provider, No Known    Consults       Date and Time Order Name Status Description    6/10/2024  4:55 PM Inpatient Neurology Consult General Completed             Hospital Course     Presenting Problem:   Seizures [R56.9]      Active and Resolved Problems  Active Hospital Problems    Diagnosis  POA    **Seizures [R56.9]  Yes      Resolved Hospital Problems   No resolved problems to display.         Hospital Course:  Melani Bhatt is a 29 y.o. female who was admitted on 6/10/24 for new onset seizures. She started having generalized seizures at home.  While in the ER, she had a second seizure. Between the initial 2 seizures she was returning back to baseline but after the second seizure she continued to be extremely combative and was started on a Precedex drip and admitted to the ICU. She was given 3 grams of Keppra and then started on keppra maintenance with 500mg BID.     LP was performed at the time of admission and was unremarkable. LP cultures have been negative.     EEG was performed and showed diffuse cerebral dysfunction of a mild degree which is nonspecific. This is most commonly seen due to toxic/metabolic cause, but may also be due to sedative medication effects.     Precedex was able to be weaned off by the next morning. Neurology has followed. MRI Brain was performed and was unremarkable. The diagnosis at this time is idiopathic epilepsy. She will continue on Keppra 500mg PO BID. She will follow up with Dr. Hurst in 6-8 weeks. She cannot drive for 3 months.       Discharge Follow Up Recommendations for labs/diagnostics:  Follow up with Dr. Hurst in 6-8 weeks.  No driving for the next 3 months.     Day of Discharge     HPI: No acute events overnight. She would like to go home.       Vital Signs:  Temp:  [97.8 °F (36.6  °C)-98.6 °F (37 °C)] 98.2 °F (36.8 °C)  Heart Rate:  [77-98] 86  Resp:  [14-18] 15  BP: ()/(55-82) 104/73    Physical Exam:  Telemetry:  Normal sinus rhythm.    Constitutional:  No acute distress.  Sitting up in bed.    Eyes: No scleral icterus.   PERRL, EOM intact.    Neck:  Supple, FROM   Cardiovascular: Normal rate, regular and rhythm. Normal heart sounds.  No murmurs, gallop or rub.   Respiratory: No respiratory distress. Normal respiratory effort.  Normal breath sounds  Clear to auscultation   Abdominal:  Soft. No masses. Nontender. No distension. No HSM.   Extremities: No digital cyanosis. No clubbing.  No peripheral edema.   Skin: No rashes, lesions or ulcers   Neurological:             Alert and Oriented to person, place, and time.       Pertinent  and/or Most Recent Results     LAB RESULTS:        Lab 06/11/24  0610 06/10/24  1316   WBC 14.19* 12.13*   HEMOGLOBIN 10.6* 11.7*   HEMATOCRIT 32.8* 37.2   PLATELETS 238 228   NEUTROS ABS 11.61* 10.67*   IMMATURE GRANS (ABS) 0.06* 0.06*   LYMPHS ABS 1.52 0.86   MONOS ABS 0.98* 0.50   EOS ABS 0.00 0.02   MCV 86.8 89.6         Lab 06/12/24  0437 06/11/24  0610 06/10/24  1316   SODIUM 142 141 139   POTASSIUM 3.9 4.0 3.9   CHLORIDE 108* 111* 108*   CO2 25.0 20.0* 18.0*   ANION GAP 9.0 10.0 13.0   BUN 6 8 8   CREATININE 0.63 0.70 0.74   EGFR 123.3 120.2 112.5   GLUCOSE 95 102* 109*   CALCIUM 8.5* 8.0* 8.1*   MAGNESIUM  --  2.1  --    PHOSPHORUS  --  3.7  --          Lab 06/10/24  1316   TOTAL PROTEIN 6.2   ALBUMIN 3.5   GLOBULIN 2.7   ALT (SGPT) 18   AST (SGOT) 25   BILIRUBIN 0.2   ALK PHOS 64                     Brief Urine Lab Results  (Last result in the past 365 days)        Color   Clarity   Blood   Leuk Est   Nitrite   Protein   CREAT   Urine HCG        06/10/24 1414               Negative             Microbiology Results (last 10 days)       Procedure Component Value - Date/Time    MRSA Screen, PCR (Inpatient) - Swab, Nares [[id number]]  (Normal) Collected:  06/10/24 1858    Lab Status: Final result Specimen: Swab from Nares Updated: 06/10/24 2019     MRSA PCR Negative    Narrative:      The negative predictive value of this diagnostic test is high and should only be used to consider de-escalating anti-MRSA therapy. A positive result may indicate colonization with MRSA and must be correlated clinically.  MRSA Negative    Culture, CSF - Cerebrospinal Fluid, Lumbar Puncture [[id number]] Collected: 06/10/24 1504    Lab Status: Preliminary result Specimen: Cerebrospinal Fluid from Lumbar Puncture Updated: 06/12/24 0627     CSF Culture No growth at 2 days     Gram Stain No WBCs or organisms seen    Narrative:      <1 ml of body fluid received in the laboratory.  Culture results may be compromised by inadequate volume.          Meningitis / Encephalitis Panel, PCR - Cerebrospinal Fluid, Lumbar Puncture [[id number]]  (Normal) Collected: 06/10/24 1504    Lab Status: Final result Specimen: Cerebrospinal Fluid from Lumbar Puncture Updated: 06/10/24 1657     ESCHERICHIA COLI K1, PCR Not Detected     HAEMOPHILUS INFLUENZAE, PCR Not Detected     LISTERIA MONOCYTOGENES, PCR Not Detected     NEISSERIA MENINGITIDIS, PCR Not Detected     STREPTOCOCCUS AGALACTIAE, PCR Not Detected     STREPTOCOCCUS PNEUMONIAE, PCR Not Detected     CYTOMEGALOVIRUS (CMV), PCR Not Detected     ENTEROVIRUS, PCR Not Detected     HERPES SIMPLEX VIRUS 1 (HSV-1), PCR Not Detected     HERPES SIMPLEX VIRUS 2 (HSV-2), PCR Not Detected     HUMAN PARECHOVIRUS, PCR Not Detected     VARICELLA ZOSTER VIRUS (VZV), PCR Not Detected     CRYPTOCOCCUS NEOFORMANS / GATTII, PCR Not Detected     HUMAN HERPES VIRUS 6 PCR Not Detected            MRI Brain With & Without Contrast    Result Date: 6/11/2024  Impression: Impression: Normal contrast-enhanced MRI of the brain, including dedicated evaluation of the mesial temporal lobe structures without evidence of atrophy or focal hippocampal asymmetry to more specifically suggest  mesial temporal sclerosis. Electronically Signed: Ha Stanley MD  6/11/2024 12:32 PM EDT  Workstation ID: [id number]    EEG    Result Date: 6/10/2024  Impression: Diffuse cerebral dysfunction of mild degree, nonspecific.  This is most commonly seen due to toxic/metabolic cause, but may also impart be due to sedative medication effect No ongoing seizures are seen This report is transcribed using the Dragon dictation system.  '     XR Chest 1 View    Result Date: 6/10/2024  Impression: Impression: 1. No visible metal in the chest or abdomen. 2. Asymmetric patchy left lower lobe opacities. Differential includes atelectasis, aspiration and developing bronchopneumonia. 3. Nonobstructive bowel gas pattern. Electronically Signed: Ramone Rojas MD  6/10/2024 6:35 PM EDT  Workstation ID: [id number]    XR Abdomen KUB    Result Date: 6/10/2024  Impression: Impression: 1. No visible metal in the chest or abdomen. 2. Asymmetric patchy left lower lobe opacities. Differential includes atelectasis, aspiration and developing bronchopneumonia. 3. Nonobstructive bowel gas pattern. Electronically Signed: Ramone Rojas MD  6/10/2024 6:35 PM EDT  Workstation ID: [id number]    CT Head Without Contrast    Result Date: 6/10/2024  Impression: Impression: No evidence of acute intracranial disease. Electronically Signed: Eric Estrella MD  6/10/2024 2:11 PM EDT  Workstation ID: [id number]                 Pending Labs       Order Current Status    Anaerobic Culture - Cerebrospinal Fluid, Spine, Lumbar In process    Ehrlichia Profile DNA PCR In process    Herpes Simplex Virus (HSV) Types 1/2, Cerebrospinal Fluid (CSF), DNA PCR - Cerebrospinal Fluid, Lumbar Puncture In process    Lyme Disease Total Antibody With Reflex to Immunoassay In process    Rickettsia Species DNA, Real-Time PCR In process    Culture, CSF - Cerebrospinal Fluid, Lumbar Puncture Preliminary result          Discharge Details        Discharge Medications        New Medications         Instructions Start Date   levETIRAcetam 500 MG tablet  Commonly known as: KEPPRA   500 mg, Oral, Every 12 Hours Scheduled             Continue These Medications        Instructions Start Date   clobetasol propionate 0.05 % cream  Commonly known as: TEMOVATE   1 application , Topical, 2 Times Daily      norgestimate-ethinyl estradiol 0.25-35 MG-MCG per tablet  Commonly known as: ORTHO-CYCLEN   1 tablet, Oral, Daily      sertraline 50 MG tablet  Commonly known as: ZOLOFT   1 tablet, Oral, Daily               No Known Allergies      Discharge Disposition:  Home or Self Care    Diet:  Hospital:  Diet Order   Procedures    Diet: Regular/House; Fluid Consistency: Thin (IDDSI 0)         Discharge Activity: No driving for 3 months.        CODE STATUS:    Code Status and Medical Interventions:   Ordered at: 06/10/24 1528     Level Of Support Discussed With:    Health Care Surrogate     Code Status (Patient has no pulse and is not breathing):    CPR (Attempt to Resuscitate)     Medical Interventions (Patient has pulse or is breathing):    Full Support         Future Appointments   Date Time Provider Department Center   6/17/2024  9:00 AM Mosoe Styles APRN [facility]   6/17/2024 11:00 AM Carmen Lozano PA-C [facility]   10/3/2024 10:30 AM Eileen Hurst MD [facility]           Time spent on Discharge including face to face service:  33 minutes    Merced ESQUIVEL Case, DO

## 2024-06-12 NOTE — PROGRESS NOTES
Neurology       Patient Care Team:  Provider, Kathy Known as PCP - General    Chief complaint: New onset seizure    History: The patient is asymptomatic.  She has had no seizures.    She has no irritability or sedation with the Keppra.    She is aware of menorrhagia is transition for vegetarian to omnivore recently.  She has difficulty tolerating oral iron because of constipation.    She is being followed by primary care for the anemia is working on eating that straightened out.      Past Medical History:   Diagnosis Date    Acne     Allergic     Depression     Genital herpes     Heart murmur     as a child    Herpes genitalis     Irregular menses     Pap smear for cervical cancer screening 08/2016    Vegan diet        Vital Signs   Vitals:    06/12/24 0600 06/12/24 0700 06/12/24 0800 06/12/24 0900   BP: 101/80 108/74 106/68 104/73   BP Location: Left arm      Patient Position: Lying      Pulse: 81 82  86   Resp: 15      Temp:   98.2 °F (36.8 °C)    TempSrc:   Oral    SpO2: 98% 98% 98% 100%   Weight:       Height:           Physical Exam:   General: Pleasant and alert.                  Neuro: Oriented to person place and time.  Normal speech.    No weakness or facial asymmetries.        Results Review:  Lyme antibodies are still pending  Results from last 7 days   Lab Units 06/11/24  0610   WBC 10*3/mm3 14.19*   HEMOGLOBIN g/dL 10.6*   HEMATOCRIT % 32.8*   PLATELETS 10*3/mm3 238     Results from last 7 days   Lab Units 06/12/24  0437 06/11/24  0610 06/10/24  1316   SODIUM mmol/L 142 141 139   POTASSIUM mmol/L 3.9 4.0 3.9   CHLORIDE mmol/L 108* 111* 108*   CO2 mmol/L 25.0 20.0* 18.0*   BUN mg/dL 6 8 8   CREATININE mg/dL 0.63 0.70 0.74   CALCIUM mg/dL 8.5* 8.0* 8.1*   BILIRUBIN mg/dL  --   --  0.2   ALK PHOS U/L  --   --  64   ALT (SGPT) U/L  --   --  18   AST (SGOT) U/L  --   --  25   GLUCOSE mg/dL 95 102* 109*       Imaging Results (Last 24 Hours)       Procedure Component Value Units Date/Time    MRI Brain With &  Without Contrast [[id number]] Collected: 06/11/24 1226     Updated: 06/11/24 1235    Narrative:        MRI BRAIN W WO CONTRAST    Date of Exam: 6/11/2024 11:30 AM EDT    Indication: New Onset Seizures.     Comparison: CT 6/10/2024.    Technique:  Routine multiplanar/multisequence sequence images of the brain were obtained before and after the uneventful administration of 13 mL Multihance.      Findings:  No acute infarct is present on diffusion weighted sequences. Midline structures are normal and the craniocervical junction appears satisfactory. Gray-white differentiation is maintained and there is no evidence of intracranial hemorrhage, mass or mass   effect. The ventricles are normal in size and configuration. The orbits are normal. The paranasal sinuses are clear. Intracranial arterial flow voids are maintained. There is no abnormal brain parenchymal enhancement. Additional thin cut dedicated   evaluation of the mesial temporal lobe structures demonstrates no evidence of atrophy or other focal hippocampal asymmetry.      Impression:      Impression:  Normal contrast-enhanced MRI of the brain, including dedicated evaluation of the mesial temporal lobe structures without evidence of atrophy or focal hippocampal asymmetry to more specifically suggest mesial temporal sclerosis.        Electronically Signed: Ha Stanley MD    6/11/2024 12:32 PM EDT    Workstation ID: [id number]            Assessment:  New onset partial complex epilepsy, idiopathic    Iron deficiency anemia secondary to menorrhagia    Plan:  Patient follow-up Dr. Hurst in 6 to 8 weeks.  Office has been notified.    No driving for 3 months.        Comment:  Doing well         I discussed the patients findings and my recommendations with patient, family, nursing staff, and primary care team    Gabe Lopez MD  06/12/24  10:30 EDT

## 2024-06-12 NOTE — OUTREACH NOTE
Prep Survey      Flowsheet Row Responses   [facility] patient discharged from? [place]   Is LACE score < 7 ? Yes   Eligibility [facility]   Date of Admission 06/10/24   Date of Discharge 06/12/24   Discharge Disposition Home or Self Care   Discharge diagnosis seizures   Does the patient have one of the following disease processes/diagnoses(primary or secondary)? Other   Does the patient have Home health ordered? No   Is there a DME ordered? No   Comments regarding appointments new PCP appt   Prep survey completed? Yes            Lisa FOX - Registered Nurse

## 2024-06-12 NOTE — TELEPHONE ENCOUNTER
Caller: Melani Bhatt    Relationship to patient: Self    Best call back number:   Telephone Information:   Mobile [phone]     New or established patient?  [x] New  [] Established    Date of discharge: 6-12-24    Facility discharged from: [facility]    Diagnosis/Symptoms: SEIZURE    Specialty Only: Did you see a [facility] provider?    [] Yes  [] No  If so, who?      PT CALLED TO [facility] SHE STATES SHE WAS ADVISED 6-8 WEEKS,  NOTE STATES ONE MONTH. HUB IS BOOKED UNTIL OCTOBER. PLEASE REVIEW AND ADVISE.

## 2024-06-12 NOTE — PAYOR COMM NOTE
"Auth# [id number]   24 Discharge Summary    Utilization Review  Phone [phone]  Fax [phone]    [place] [address]           Melani Segovia (29 y.o. Female)       Date of Birth   1995    Social Security Number       Address   [address] [facility] [address]    Home Phone   [phone]    MRN   [id number]       Voodoo   None    Marital Status   Single                            Admission Date   6/10/24    Admission Type   Emergency    Admitting Provider   Michael Moncada DO    Attending Provider       Department, Room/Bed   [facility] 2B ICU, N236/1       Discharge Date   2024    Discharge Disposition   Home or Self Care    Discharge Destination                                 Attending Provider: (none)   Allergies: No Known Allergies    Isolation: None   Infection: None   Code Status: CPR    Ht: 177.8 cm (70\")   Wt: 82.7 kg (182 lb 5.1 oz)    Admission Cmt: None   Principal Problem: Seizures [R56.9]                   Active Insurance as of 6/10/2024       Primary Coverage       Payor Plan Insurance Group Employer/Plan Group    [facility] COMMUNITY PLAN [facility] COMMUNITY PLAN [facility]       Payor Plan Address Payor Plan Phone Number Payor Plan Fax Number Effective Dates    [address]   2022 - None Entered    [facility] [phone]         Subscriber Name Subscriber Birth Date Member ID       MELANI SEGOVIA 1995 [id number]                     Emergency Contacts        (Rel.) Home Phone Work Phone Mobile Phone    LUCATANYA (Mother) [phone] -- [phone]    LucaBasilio (Father) -- -- [phone]                 Discharge Summary        Case, Merced WHALEN DO at 24 1040            [facility]   DISCHARGE SUMMARY    Patient Name: Melani Segovia  : 1995  MRN: [id number]    Date of Admission: 6/10/2024  Date of Discharge:  2024  Primary Care " Physician: Provider, No Known    Consults       Date and Time Order Name Status Description    6/10/2024  4:55 PM Inpatient Neurology Consult General Completed             Hospital Course     Presenting Problem:   Seizures [R56.9]      Active and Resolved Problems  Active Hospital Problems    Diagnosis  POA    **Seizures [R56.9]  Yes      Resolved Hospital Problems   No resolved problems to display.         Hospital Course:  Melani Bhatt is a 29 y.o. female who was admitted on 6/10/24 for new onset seizures. She started having generalized seizures at home.  While in the ER, she had a second seizure. Between the initial 2 seizures she was returning back to baseline but after the second seizure she continued to be extremely combative and was started on a Precedex drip and admitted to the ICU. She was given 3 grams of Keppra and then started on keppra maintenance with 500mg BID.     LP was performed at the time of admission and was unremarkable. LP cultures have been negative.     EEG was performed and showed diffuse cerebral dysfunction of a mild degree which is nonspecific. This is most commonly seen due to toxic/metabolic cause, but may also be due to sedative medication effects.     Precedex was able to be weaned off by the next morning. Neurology has followed. MRI Brain was performed and was unremarkable. The diagnosis at this time is idiopathic epilepsy. She will continue on Keppra 500mg PO BID. She will follow up with Dr. Hurst in 6-8 weeks. She cannot drive for 3 months.       Discharge Follow Up Recommendations for labs/diagnostics:  Follow up with Dr. Hurst in 6-8 weeks.  No driving for the next 3 months.     Day of Discharge     HPI: No acute events overnight. She would like to go home.       Vital Signs:  Temp:  [97.8 °F (36.6 °C)-98.6 °F (37 °C)] 98.2 °F (36.8 °C)  Heart Rate:  [77-98] 86  Resp:  [14-18] 15  BP: ()/(55-82) 104/73    Physical Exam:  Telemetry:  Normal sinus rhythm.     Constitutional:  No acute distress.  Sitting up in bed.    Eyes: No scleral icterus.   PERRL, EOM intact.    Neck:  Supple, FROM   Cardiovascular: Normal rate, regular and rhythm. Normal heart sounds.  No murmurs, gallop or rub.   Respiratory: No respiratory distress. Normal respiratory effort.  Normal breath sounds  Clear to auscultation   Abdominal:  Soft. No masses. Nontender. No distension. No HSM.   Extremities: No digital cyanosis. No clubbing.  No peripheral edema.   Skin: No rashes, lesions or ulcers   Neurological:             Alert and Oriented to person, place, and time.       Pertinent  and/or Most Recent Results     LAB RESULTS:        Lab 06/11/24  0610 06/10/24  1316   WBC 14.19* 12.13*   HEMOGLOBIN 10.6* 11.7*   HEMATOCRIT 32.8* 37.2   PLATELETS 238 228   NEUTROS ABS 11.61* 10.67*   IMMATURE GRANS (ABS) 0.06* 0.06*   LYMPHS ABS 1.52 0.86   MONOS ABS 0.98* 0.50   EOS ABS 0.00 0.02   MCV 86.8 89.6         Lab 06/12/24  0437 06/11/24  0610 06/10/24  1316   SODIUM 142 141 139   POTASSIUM 3.9 4.0 3.9   CHLORIDE 108* 111* 108*   CO2 25.0 20.0* 18.0*   ANION GAP 9.0 10.0 13.0   BUN 6 8 8   CREATININE 0.63 0.70 0.74   EGFR 123.3 120.2 112.5   GLUCOSE 95 102* 109*   CALCIUM 8.5* 8.0* 8.1*   MAGNESIUM  --  2.1  --    PHOSPHORUS  --  3.7  --          Lab 06/10/24  1316   TOTAL PROTEIN 6.2   ALBUMIN 3.5   GLOBULIN 2.7   ALT (SGPT) 18   AST (SGOT) 25   BILIRUBIN 0.2   ALK PHOS 64                     Brief Urine Lab Results  (Last result in the past 365 days)        Color   Clarity   Blood   Leuk Est   Nitrite   Protein   CREAT   Urine HCG        06/10/24 1414               Negative             Microbiology Results (last 10 days)       Procedure Component Value - Date/Time    MRSA Screen, PCR (Inpatient) - Swab, Nares [[id number]]  (Normal) Collected: 06/10/24 2638    Lab Status: Final result Specimen: Swab from Nares Updated: 06/10/24 2019     MRSA PCR Negative    Narrative:      The negative predictive value of  this diagnostic test is high and should only be used to consider de-escalating anti-MRSA therapy. A positive result may indicate colonization with MRSA and must be correlated clinically.  MRSA Negative    Culture, CSF - Cerebrospinal Fluid, Lumbar Puncture [[id number]] Collected: 06/10/24 1504    Lab Status: Preliminary result Specimen: Cerebrospinal Fluid from Lumbar Puncture Updated: 06/12/24 0627     CSF Culture No growth at 2 days     Gram Stain No WBCs or organisms seen    Narrative:      <1 ml of body fluid received in the laboratory.  Culture results may be compromised by inadequate volume.          Meningitis / Encephalitis Panel, PCR - Cerebrospinal Fluid, Lumbar Puncture [[id number]]  (Normal) Collected: 06/10/24 1504    Lab Status: Final result Specimen: Cerebrospinal Fluid from Lumbar Puncture Updated: 06/10/24 1657     ESCHERICHIA COLI K1, PCR Not Detected     HAEMOPHILUS INFLUENZAE, PCR Not Detected     LISTERIA MONOCYTOGENES, PCR Not Detected     NEISSERIA MENINGITIDIS, PCR Not Detected     STREPTOCOCCUS AGALACTIAE, PCR Not Detected     STREPTOCOCCUS PNEUMONIAE, PCR Not Detected     CYTOMEGALOVIRUS (CMV), PCR Not Detected     ENTEROVIRUS, PCR Not Detected     HERPES SIMPLEX VIRUS 1 (HSV-1), PCR Not Detected     HERPES SIMPLEX VIRUS 2 (HSV-2), PCR Not Detected     HUMAN PARECHOVIRUS, PCR Not Detected     VARICELLA ZOSTER VIRUS (VZV), PCR Not Detected     CRYPTOCOCCUS NEOFORMANS / GATTII, PCR Not Detected     HUMAN HERPES VIRUS 6 PCR Not Detected            MRI Brain With & Without Contrast    Result Date: 6/11/2024  Impression: Impression: Normal contrast-enhanced MRI of the brain, including dedicated evaluation of the mesial temporal lobe structures without evidence of atrophy or focal hippocampal asymmetry to more specifically suggest mesial temporal sclerosis. Electronically Signed: Ha Stanley MD  6/11/2024 12:32 PM EDT  Workstation ID: [id number]    EEG    Result Date: 6/10/2024  Impression:  Diffuse cerebral dysfunction of mild degree, nonspecific.  This is most commonly seen due to toxic/metabolic cause, but may also impart be due to sedative medication effect No ongoing seizures are seen This report is transcribed using the Dragon dictation system.  '     XR Chest 1 View    Result Date: 6/10/2024  Impression: Impression: 1. No visible metal in the chest or abdomen. 2. Asymmetric patchy left lower lobe opacities. Differential includes atelectasis, aspiration and developing bronchopneumonia. 3. Nonobstructive bowel gas pattern. Electronically Signed: Ramone Rojas MD  6/10/2024 6:35 PM EDT  Workstation ID: [id number]    XR Abdomen KUB    Result Date: 6/10/2024  Impression: Impression: 1. No visible metal in the chest or abdomen. 2. Asymmetric patchy left lower lobe opacities. Differential includes atelectasis, aspiration and developing bronchopneumonia. 3. Nonobstructive bowel gas pattern. Electronically Signed: Ramone Rojas MD  6/10/2024 6:35 PM EDT  Workstation ID: [id number]    CT Head Without Contrast    Result Date: 6/10/2024  Impression: Impression: No evidence of acute intracranial disease. Electronically Signed: Eric Estrella MD  6/10/2024 2:11 PM EDT  Workstation ID: [id number]                 Pending Labs       Order Current Status    Anaerobic Culture - Cerebrospinal Fluid, Spine, Lumbar In process    Ehrlichia Profile DNA PCR In process    Herpes Simplex Virus (HSV) Types 1/2, Cerebrospinal Fluid (CSF), DNA PCR - Cerebrospinal Fluid, Lumbar Puncture In process    Lyme Disease Total Antibody With Reflex to Immunoassay In process    Rickettsia Species DNA, Real-Time PCR In process    Culture, CSF - Cerebrospinal Fluid, Lumbar Puncture Preliminary result          Discharge Details        Discharge Medications        New Medications        Instructions Start Date   levETIRAcetam 500 MG tablet  Commonly known as: KEPPRA   500 mg, Oral, Every 12 Hours Scheduled             Continue These Medications         Instructions Start Date   clobetasol propionate 0.05 % cream  Commonly known as: TEMOVATE   1 application , Topical, 2 Times Daily      norgestimate-ethinyl estradiol 0.25-35 MG-MCG per tablet  Commonly known as: ORTHO-CYCLEN   1 tablet, Oral, Daily      sertraline 50 MG tablet  Commonly known as: ZOLOFT   1 tablet, Oral, Daily               No Known Allergies      Discharge Disposition:  Home or Self Care    Diet:  Hospital:  Diet Order   Procedures    Diet: Regular/House; Fluid Consistency: Thin (IDDSI 0)         Discharge Activity: No driving for 3 months.        CODE STATUS:    Code Status and Medical Interventions:   Ordered at: 06/10/24 1528     Level Of Support Discussed With:    Health Care Surrogate     Code Status (Patient has no pulse and is not breathing):    CPR (Attempt to Resuscitate)     Medical Interventions (Patient has pulse or is breathing):    Full Support         Future Appointments   Date Time Provider Department Center   6/17/2024  9:00 AM Moose Styles APRN [facility]   6/17/2024 11:00 AM Carmen Lozano PA-C [facility]   10/3/2024 10:30 AM Eileen Hurst MD [facility]           Time spent on Discharge including face to face service:  33 minutes    Merced Walton DO    Electronically signed by Merced Walton DO at 06/12/24 1046       Discharge Order (From admission, onward)       Start     Ordered    06/12/24 1040  Discharge patient  Once        Expected Discharge Date: 06/12/24   Discharge Disposition: Home or Self Care   Physician of Record for Attribution - Please select from Treatment Team: MERCED WALTON [[id number]]   Review needed by CMO to determine Physician of Record: No      Question Answer Comment   Physician of Record for Attribution - Please select from Treatment Team MERCED WALTON    Review needed by CMO to determine Physician of Record No        06/12/24 1040

## 2024-06-12 NOTE — PLAN OF CARE
Problem: Restraint, Nonviolent  Goal: Absence of Harm or Injury  Outcome: Ongoing, Progressing  Intervention: Implement Least Restrictive Safety Strategies  Recent Flowsheet Documentation  Taken 6/12/2024 0000 by Wendie Armstrong RN  Medical Device Protection:   torso covered   tubing secured  Taken 6/11/2024 2200 by Wendie Armstrong RN  Medical Device Protection:   torso covered   tubing secured  Taken 6/11/2024 2000 by Wendie Armstrong RN  Medical Device Protection:   torso covered   tubing secured  Intervention: Protect Dignity, Rights, and Personal Wellbeing  Recent Flowsheet Documentation  Taken 6/12/2024 0000 by Wendie Armstrong RN  Trust Relationship/Rapport: choices provided  Taken 6/11/2024 2000 by Wendie Armstrong RN  Trust Relationship/Rapport:   care explained   choices provided   thoughts/feelings acknowledged  Intervention: Protect Skin and Joint Integrity  Recent Flowsheet Documentation  Taken 6/12/2024 0000 by Wendie Armstrong RN  Body Position: position changed independently  Taken 6/11/2024 2200 by Wendie Armstrong RN  Body Position: position changed independently  Taken 6/11/2024 2000 by Wendie Armstrong RN  Body Position: position changed independently     Problem: Adult Inpatient Plan of Care  Goal: Plan of Care Review  Outcome: Ongoing, Progressing  Goal: Patient-Specific Goal (Individualized)  Outcome: Ongoing, Progressing  Goal: Absence of Hospital-Acquired Illness or Injury  Outcome: Ongoing, Progressing  Intervention: Identify and Manage Fall Risk  Recent Flowsheet Documentation  Taken 6/12/2024 0000 by Armstrong, Wendie, RN  Safety Promotion/Fall Prevention: safety round/check completed  Taken 6/11/2024 2200 by Wendie Armstrong RN  Safety Promotion/Fall Prevention: safety round/check completed  Taken 6/11/2024 2000 by Armstrong, Wendie, RN  Safety Promotion/Fall Prevention: safety round/check completed  Intervention: Prevent Skin Injury  Recent  Flowsheet Documentation  Taken 6/12/2024 0000 by Wendie Armstrong RN  Body Position: position changed independently  Skin Protection:   adhesive use limited   tubing/devices free from skin contact  Taken 6/11/2024 2200 by Wendie Armstrong RN  Body Position: position changed independently  Taken 6/11/2024 2000 by Wendie Armstrong RN  Body Position: position changed independently  Skin Protection:   adhesive use limited   tubing/devices free from skin contact  Intervention: Prevent and Manage VTE (Venous Thromboembolism) Risk  Recent Flowsheet Documentation  Taken 6/12/2024 0000 by Wendie Armstrong RN  Activity Management: bedrest  VTE Prevention/Management: (lovenox) other (see comments)  Taken 6/11/2024 2200 by Wendie Armstrong RN  Activity Management: bedrest  Taken 6/11/2024 2100 by Wendie Armstrong RN  Activity Management: back to bed  Taken 6/11/2024 2000 by Wendie Armstrong RN  Activity Management: up in chair  VTE Prevention/Management: (lovenox) other (see comments)  Intervention: Prevent Infection  Recent Flowsheet Documentation  Taken 6/12/2024 0000 by Wendie Armstrong RN  Infection Prevention: rest/sleep promoted  Taken 6/11/2024 2200 by Wendie Armstrong RN  Infection Prevention: rest/sleep promoted  Taken 6/11/2024 2000 by Wendie Armstrong RN  Infection Prevention:   environmental surveillance performed   visitors restricted/screened   single patient room provided   rest/sleep promoted   hand hygiene promoted  Goal: Optimal Comfort and Wellbeing  Outcome: Ongoing, Progressing  Intervention: Provide Person-Centered Care  Recent Flowsheet Documentation  Taken 6/12/2024 0000 by Wendie Armstrong RN  Trust Relationship/Rapport: choices provided  Taken 6/11/2024 2000 by Wendie Armstrong RN  Trust Relationship/Rapport:   care explained   choices provided   thoughts/feelings acknowledged  Goal: Readiness for Transition of Care  Outcome: Ongoing, Progressing      Problem: Skin Injury Risk Increased  Goal: Skin Health and Integrity  Outcome: Ongoing, Progressing  Intervention: Optimize Skin Protection  Recent Flowsheet Documentation  Taken 6/12/2024 0000 by Wendie Armstrong RN  Pressure Reduction Techniques: frequent weight shift encouraged  Head of Bed (HOB) Positioning: HOB flat  Pressure Reduction Devices: pressure-redistributing mattress utilized  Skin Protection:   adhesive use limited   tubing/devices free from skin contact  Taken 6/11/2024 2200 by Wendie Armstrong RN  Head of Bed (HOB) Positioning: HOB flat  Taken 6/11/2024 2000 by Wendie Armstrong RN  Pressure Reduction Techniques: frequent weight shift encouraged  Head of Bed (HOB) Positioning: HOB at 30-45 degrees  Pressure Reduction Devices: pressure-redistributing mattress utilized  Skin Protection:   adhesive use limited   tubing/devices free from skin contact     Problem: Seizure, Active Management  Goal: Absence of Seizure/Seizure-Related Injury  Outcome: Ongoing, Progressing   Goal Outcome Evaluation:      Patient alert and oriented x4; GCS 15. Neurologically intact with no current signs of seizure activity. PERRLA. Mobility back at baseline; up ad lucy independently to toilet. Ambulates in room multiple times this shift.  Stable on room air.  NSR on monitor.  Last bowel movement on 6/11 per patient; scheduled Senna refused. Anticipated discharge home on 6/12. Mother at bedside. Seizure precautions remain in place. Bed low and locked with side rails padded.

## 2024-06-13 ENCOUNTER — TRANSITIONAL CARE MANAGEMENT TELEPHONE ENCOUNTER (OUTPATIENT)
Dept: CALL CENTER | Facility: HOSPITAL | Age: 29
End: 2024-06-13
Payer: MEDICAID

## 2024-06-13 LAB
BACTERIA SPEC AEROBE CULT: NORMAL
GRAM STN SPEC: NORMAL

## 2024-06-13 NOTE — OUTREACH NOTE
Call Center TCM Note      Flowsheet Row Responses   [facility] discharged from? [facility]   Does the patient have one of the following disease processes/diagnoses(primary or secondary)? Other   TCM attempt successful? Yes   Call start time 1251   Call end time 1253   Discharge diagnosis seizures   Meds reviewed with patient/caregiver? Yes   Is the patient having any side effects they believe may be caused by any medication additions or changes? No   Does the patient have all medications ordered at discharge? Yes   Is the patient taking all medications as directed (includes completed medication regime)? Yes   Does the patient have an appointment with their PCP within 7-14 days of discharge? Yes   Has home health visited the patient within 72 hours of discharge? N/A   Psychosocial issues? No   Did the patient receive a copy of their discharge instructions? Yes   Nursing interventions Reviewed instructions with patient   What is the patient's perception of their health status since discharge? Improving   Is the patient/caregiver able to teach back signs and symptoms related to disease process for when to call PCP? Yes   Is the patient/caregiver able to teach back signs and symptoms related to disease process for when to call 911? Yes   Is the patient/caregiver able to teach back the hierarchy of who to call/visit for symptoms/problems? PCP, Specialist, Home health nurse, Urgent Care, ED, 911 Yes   If the patient is a current smoker, are they able to teach back resources for cessation? Not a smoker   TCM call completed? Yes   Call end time 1253   Would this patient benefit from a Referral to Amb Social Work? No   Is the patient interested in additional calls from an ambulatory ? No            Annette Vazquez LPN    6/13/2024, 12:55 EDT

## 2024-06-14 LAB
A PHAGOCYTOPH DNA BLD QL NAA+PROBE: NEGATIVE
E CHAFFEENSIS DNA BLD QL NAA+PROBE: NEGATIVE

## 2024-06-15 LAB — BACTERIA SPEC ANAEROBE CULT: NORMAL

## 2024-06-16 LAB — RICKETTSIA RICKETTSII DNA, RT: NOT DETECTED

## 2024-06-17 ENCOUNTER — OFFICE VISIT (OUTPATIENT)
Dept: INTERNAL MEDICINE | Facility: CLINIC | Age: 29
End: 2024-06-17
Payer: MEDICAID

## 2024-06-17 ENCOUNTER — OFFICE VISIT (OUTPATIENT)
Dept: OBSTETRICS AND GYNECOLOGY | Facility: CLINIC | Age: 29
End: 2024-06-17
Payer: MEDICAID

## 2024-06-17 VITALS
OXYGEN SATURATION: 98 % | SYSTOLIC BLOOD PRESSURE: 94 MMHG | DIASTOLIC BLOOD PRESSURE: 62 MMHG | WEIGHT: 172.6 LBS | HEART RATE: 80 BPM | TEMPERATURE: 97.6 F | HEIGHT: 70 IN | BODY MASS INDEX: 24.71 KG/M2

## 2024-06-17 VITALS
DIASTOLIC BLOOD PRESSURE: 64 MMHG | BODY MASS INDEX: 24.47 KG/M2 | HEIGHT: 70 IN | WEIGHT: 170.9 LBS | SYSTOLIC BLOOD PRESSURE: 110 MMHG

## 2024-06-17 DIAGNOSIS — Z01.419 ENCOUNTER FOR ANNUAL ROUTINE GYNECOLOGICAL EXAMINATION: ICD-10-CM

## 2024-06-17 DIAGNOSIS — Z11.3 SCREENING FOR STD (SEXUALLY TRANSMITTED DISEASE): Primary | ICD-10-CM

## 2024-06-17 DIAGNOSIS — G40.309 NONINTRACTABLE GENERALIZED IDIOPATHIC EPILEPSY WITHOUT STATUS EPILEPTICUS: Primary | ICD-10-CM

## 2024-06-17 DIAGNOSIS — Z30.41 ENCOUNTER FOR SURVEILLANCE OF CONTRACEPTIVE PILLS: ICD-10-CM

## 2024-06-17 DIAGNOSIS — R19.5 LOOSE STOOLS: ICD-10-CM

## 2024-06-17 PROCEDURE — 2014F MENTAL STATUS ASSESS: CPT | Performed by: NURSE PRACTITIONER

## 2024-06-17 PROCEDURE — 1159F MED LIST DOCD IN RCRD: CPT | Performed by: NURSE PRACTITIONER

## 2024-06-17 PROCEDURE — 99204 OFFICE O/P NEW MOD 45 MIN: CPT

## 2024-06-17 PROCEDURE — 99395 PREV VISIT EST AGE 18-39: CPT | Performed by: NURSE PRACTITIONER

## 2024-06-17 PROCEDURE — 1126F AMNT PAIN NOTED NONE PRSNT: CPT

## 2024-06-17 PROCEDURE — 1160F RVW MEDS BY RX/DR IN RCRD: CPT | Performed by: NURSE PRACTITIONER

## 2024-06-17 RX ORDER — NORGESTIMATE AND ETHINYL ESTRADIOL 0.25-0.035
1 KIT ORAL DAILY
Qty: 112 TABLET | Refills: 4 | Status: SHIPPED | OUTPATIENT
Start: 2024-06-17

## 2024-06-17 RX ORDER — DIPHENOXYLATE HYDROCHLORIDE AND ATROPINE SULFATE 2.5; .025 MG/1; MG/1
TABLET ORAL DAILY
COMMUNITY

## 2024-06-17 NOTE — ASSESSMENT & PLAN NOTE
- Follow up with Dr. Hurst as scheduled  - Patient will return to the office in 3-months for physical

## 2024-06-17 NOTE — PROGRESS NOTES
Gynecologic Annual Exam Note        Annual Exam and Discuss Contraception        Subjective     HPI  Melani Bhatt is a 29 y.o.  female who presents for annual well woman exam as a established patient. Since her last visit the patient was diagnosed with seizures and was admitted to the hospital 6/10- and was prescribed Keppra to take every 12 hours . Patient's last menstrual period was 2024. Her periods occur every 30 days, lasting 5 days.  The flow is moderate-heavy. She reports dysmenorrhea is moderate occurring premenstrually and first 1-2 days of flow.     Marital Status: single.  She is sexually active. She has had new partners. STD testing recommendations have been explained to the patient and she does desire STD testing.    The patient would like to discuss the following complaints today: none    Additional OB/GYN History   contraceptive methods: OCP (estrogen/progesterone)  Desires to:  discuss  contraception due to heavy menses with being anemic  Thromboembolic Disease: none  History of migraines: no  Age of menarche: 11    History of STD: yes HSV    Last Pap : 2022. Results: negative. HPV: negative, STD testing: negative  Last Completed Pap Smear            PAP SMEAR (Every 3 Years) Next due on 2022  SCANNED - PAP SMEAR    2021  SCANNED - PAP SMEAR    2020  Done - negative.    2016  Patient-Reported (Performed Externally)                     History of abnormal Pap smear: no  Gardasil status:completed  Family history of uterine, colon, breast, or ovarian cancer:  yes - Mother, PGM and MGM had breast cancer and PGM had ovarian cancer  Performs monthly Self-Breast Exam: yes  Exercises Regularly:yes  Feelings of Anxiety or Depression: yes - both  Tobacco Usage?: No       Current Outpatient Medications:     levETIRAcetam (KEPPRA) 500 MG tablet, Take 1 tablet by mouth Every 12 (Twelve) Hours., Disp: 60 tablet, Rfl: 1    multivitamin  (MULTI-VITAMIN DAILY PO), Take  by mouth Daily., Disp: , Rfl:     norgestimate-ethinyl estradiol (ORTHO-CYCLEN) 0.25-35 MG-MCG per tablet, Take 1 tablet by mouth Daily. Take in a continuous fashion, skipping placebos, Disp: 112 tablet, Rfl: 4    sertraline (ZOLOFT) 50 MG tablet, Take 0.5 tablets by mouth Daily., Disp: , Rfl:      Patient denies the need for medication refills today.    OB History          0    Para   0    Term   0       0    AB   0    Living   0         SAB   0    IAB   0    Ectopic   0    Molar   0    Multiple   0    Live Births   0                Health Maintenance   Topic Date Due    HEPATITIS C SCREENING  Never done    ANNUAL PHYSICAL  2019    Annual Gynecologic Pelvic and Breast Exam  2024    INFLUENZA VACCINE  2024    PAP SMEAR  2025    TDAP/TD VACCINES (3 - Td or Tdap) 2026    COVID-19 Vaccine  Completed    Pneumococcal Vaccine 0-64  Aged Out       Past Medical History:   Diagnosis Date    Acne     Allergic     Depression     Genital herpes     Heart murmur     as a child    Herpes genitalis     Irregular menses     Ovarian cyst     Pap smear for cervical cancer screening 2016    Seizures     Vegan diet         Past Surgical History:   Procedure Laterality Date    WISDOM TOOTH EXTRACTION      x4       The additional following portions of the patient's history were reviewed and updated as appropriate: allergies, current medications, past family history, past medical history, past social history, past surgical history, and problem list.    Review of Systems   Constitutional: Negative.    HENT: Negative.     Eyes: Negative.    Respiratory: Negative.     Cardiovascular: Negative.    Gastrointestinal: Negative.    Endocrine: Negative.    Genitourinary: Negative.    Musculoskeletal: Negative.    Skin: Negative.    Allergic/Immunologic: Negative.    Neurological: Negative.    Hematological: Negative.    Psychiatric/Behavioral: Negative.       I have  "reviewed and agree with the HPI, ROS, and historical information as entered above. Moose Styles, APRN      Objective   /64   Ht 177.8 cm (70\")   Wt 77.5 kg (170 lb 14.4 oz)   LMP 06/11/2024   Breastfeeding No   BMI 24.52 kg/m²     Physical Exam  Vitals and nursing note reviewed. Exam conducted with a chaperone present.   Constitutional:       Appearance: Normal appearance. She is well-developed.   HENT:      Head: Normocephalic and atraumatic.   Neck:      Thyroid: No thyroid mass or thyromegaly.   Cardiovascular:      Rate and Rhythm: Normal rate.      Heart sounds: No murmur heard.  Pulmonary:      Effort: Pulmonary effort is normal. No retractions.      Breath sounds: No wheezing, rhonchi or rales.   Chest:      Chest wall: No mass or tenderness.   Breasts:     Right: Normal. No mass, nipple discharge, skin change or tenderness.      Left: Normal. No mass, nipple discharge, skin change or tenderness.   Abdominal:      Palpations: Abdomen is soft. Abdomen is not rigid. There is no mass.      Tenderness: There is no abdominal tenderness. There is no guarding.      Hernia: No hernia is present.   Genitourinary:     General: Normal vulva.      Exam position: Lithotomy position.      Labia:         Right: No rash, tenderness or lesion.         Left: No rash, tenderness or lesion.       Vagina: Normal. No vaginal discharge or lesions.      Cervix: No cervical motion tenderness, discharge, lesion or cervical bleeding.      Uterus: Normal. Not enlarged, not fixed and not tender.       Adnexa: Right adnexa normal and left adnexa normal.        Right: No mass or tenderness.          Left: No mass or tenderness.        Rectum: Normal. No external hemorrhoid.   Musculoskeletal:      Cervical back: Normal range of motion. No muscular tenderness.   Neurological:      Mental Status: She is alert and oriented to person, place, and time.   Psychiatric:         Behavior: Behavior normal.          Assessment " and Plan    Problem List Items Addressed This Visit    None  Visit Diagnoses       Screening for STD (sexually transmitted disease)    -  Primary    Relevant Orders    Chlamydia trachomatis, Neisseria gonorrhoeae, Trichomonas vaginalis, PCR - Swab, Cervix    Encounter for annual routine gynecological examination        Relevant Medications    norgestimate-ethinyl estradiol (ORTHO-CYCLEN) 0.25-35 MG-MCG per tablet    Other Relevant Orders    Chlamydia trachomatis, Neisseria gonorrhoeae, Trichomonas vaginalis, PCR - Swab, Cervix    Encounter for surveillance of contraceptive pills        Relevant Medications    norgestimate-ethinyl estradiol (ORTHO-CYCLEN) 0.25-35 MG-MCG per tablet            GYN annual well woman exam.   Reviewed pap guidelines. Pap not due. STI testing today.  Doing well on current ocp but would like to start taking in a continuous fashion in order to try to improve anemia. Rx refilled for continuous use.  Fibrocystic breast changes - Encouraged decreasing caffeine, supportive bra, low dose vitamin E supplementation.  Reviewed monthly self breast exams.  Instructed to call with lumps, pain, or breast discharge.    Reviewed exercise as a preventative health measures.   Reccommended Flu Vaccine in Fall of each year.  RTC in 1 year or PRN with problems      Moose Styles, APRN  06/17/2024

## 2024-06-17 NOTE — PROGRESS NOTES
"Transitional Care Follow Up Visit  Subjective     Melani Bhatt is a 29 y.o. female who presents for a transitional care management visit.    Within 48 business hours after discharge our office contacted her via telephone to coordinate her care and needs.      I reviewed and discussed the details of that call along with the discharge summary, hospital problems, inpatient lab results, inpatient diagnostic studies, and consultation reports with Melani.     Current outpatient and discharge medications have been reconciled for the patient.  Reviewed by: Carmen Lozano PA-C          6/12/2024     6:43 PM   Date of TCM Phone Call   [facility]   Date of Admission 6/10/2024   Date of Discharge 6/12/2024   Discharge Disposition Home or Self Care     Risk for Readmission (LACE) Score: 5 (6/12/2024  6:00 AM)      History of Present Illness   Course During Hospital Stay: Patient was admitted on 6/10/2024 for new onset seizures.  She had an LP performed that was unremarkable.  EEG was performed and showed diffuse cerebral dysfunction of a mild degree which was ruled nonspecific.  She was initiated on Keppra 500 mg p.o. twice daily.  Diagnosis at discharge was idiopathic epilepsy.  She was advised to follow-up with Dr. Hurst in 6-8 weeks and has an appointment scheduled for 07/25/2024.    Since discharge, patient reports she has been doing well overall. She has continued to feel a little \"fuzzy\" and tired but this is improving. Patient states she has been taking Keppra as prescribed. She has not been driving. She lives at home with her parents.  She has been slowly reducing Zoloft and is currently taking 25 mg but would like to discontinue in the future.     She does report intermittent diarrhea since discharge. She was given IV antibiotics while admitted but these have been discontinued since.     She previously was not established with a primary care provider prior to admission.     The following " "portions of the patient's history were reviewed and updated as appropriate: allergies, current medications, past family history, past medical history, past social history, past surgical history, and problem list.    Review of Systems   Constitutional:  Positive for fatigue. Negative for chills and fever.   Respiratory:  Negative for shortness of breath.    Cardiovascular:  Negative for chest pain.   Gastrointestinal:  Positive for diarrhea. Negative for abdominal pain, blood in stool, constipation and nausea.   Genitourinary:  Negative for dysuria.   Neurological:  Positive for seizures. Negative for syncope and headaches.   Psychiatric/Behavioral:  Negative for suicidal ideas.        Objective   BP 94/62   Pulse 80   Temp 97.6 °F (36.4 °C)   Ht 177.8 cm (70\")   Wt 78.3 kg (172 lb 9.6 oz)   SpO2 98% Comment: ra  BMI 24.77 kg/m²     Physical Exam  Vitals and nursing note reviewed.   Constitutional:       General: She is not in acute distress.     Appearance: Normal appearance.   HENT:      Head: Normocephalic and atraumatic.      Right Ear: Tympanic membrane, ear canal and external ear normal.      Left Ear: Tympanic membrane, ear canal and external ear normal.      Mouth/Throat:      Pharynx: Oropharynx is clear.   Eyes:      Extraocular Movements: Extraocular movements intact.      Conjunctiva/sclera: Conjunctivae normal.      Pupils: Pupils are equal, round, and reactive to light.   Cardiovascular:      Rate and Rhythm: Normal rate and regular rhythm.      Pulses: Normal pulses.      Heart sounds: Normal heart sounds. No murmur heard.  Pulmonary:      Effort: Pulmonary effort is normal. No respiratory distress.      Breath sounds: Normal breath sounds. No wheezing.   Abdominal:      General: Bowel sounds are normal.      Palpations: Abdomen is soft.      Tenderness: There is no abdominal tenderness.   Musculoskeletal:         General: No swelling. Normal range of motion.      Cervical back: Normal range of " motion and neck supple.   Skin:     General: Skin is warm and dry.   Neurological:      General: No focal deficit present.      Mental Status: She is alert and oriented to person, place, and time.   Psychiatric:         Mood and Affect: Mood normal.         Behavior: Behavior normal.         Assessment & Plan   Diagnoses and all orders for this visit:    1. Nonintractable generalized idiopathic epilepsy without status epilepticus (Primary)  Assessment & Plan:  - Follow up with Dr. Hurst as scheduled  - Patient will return to the office in 3-months for physical      2. Loose stools  Assessment & Plan:  - Patient is to call the office by the end of the week if sx do not resolve        Carmen Lozano PA-C

## 2024-06-20 RX ORDER — ONDANSETRON 4 MG/1
4 TABLET, ORALLY DISINTEGRATING ORAL EVERY 8 HOURS PRN
Qty: 20 TABLET | Refills: 0 | Status: SHIPPED | OUTPATIENT
Start: 2024-06-20 | End: 2024-06-21 | Stop reason: SDUPTHER

## 2024-06-20 RX ORDER — ONDANSETRON 4 MG/1
4 TABLET, ORALLY DISINTEGRATING ORAL EVERY 8 HOURS PRN
Qty: 20 TABLET | Refills: 0 | OUTPATIENT
Start: 2024-06-20

## 2024-06-21 LAB
C TRACH RRNA SPEC QL NAA+PROBE: NEGATIVE
N GONORRHOEA RRNA SPEC QL NAA+PROBE: NEGATIVE
T VAGINALIS RRNA SPEC QL NAA+PROBE: NEGATIVE

## 2024-06-21 RX ORDER — ONDANSETRON 4 MG/1
4 TABLET, ORALLY DISINTEGRATING ORAL EVERY 8 HOURS PRN
Qty: 20 TABLET | Refills: 0 | Status: SHIPPED | OUTPATIENT
Start: 2024-06-21

## 2024-06-21 NOTE — TELEPHONE ENCOUNTER
Caller: LUCATANYA    Relationship: Mother    Best call back number [phone]     Requested Prescriptions:   Requested Prescriptions     Pending Prescriptions Disp Refills    ondansetron ODT (ZOFRAN-ODT) 4 MG disintegrating tablet 20 tablet 0     Sig: Take 1 tablet by mouth Every 8 (Eight) Hours As Needed for Nausea or Vomiting.        Pharmacy where request should be sent: [facility]/PHARMACY #[id number] - [place], [place] - [address] [phone] [facility] [phone]      Last office visit with prescribing clinician: 6/17/2024   Last telemedicine visit with prescribing clinician: Visit date not found   Next office visit with prescribing clinician: 9/16/2024     Additional details provided by patient: PHARMACY STATES THEY NEVER RECEIVED A REQUEST FOR THIS MEDICATION.     Does the patient have less than a 3 day supply:  [x] Yes  [] No    Would you like a call back once the refill request has been completed: [] Yes [x] No    If the office needs to give you a call back, can they leave a voicemail: [] Yes [x] No    Cadance Dunaway, RegSched Rep   06/21/24 13:27 EDT

## 2024-07-06 ENCOUNTER — PATIENT MESSAGE (OUTPATIENT)
Dept: INTERNAL MEDICINE | Facility: CLINIC | Age: 29
End: 2024-07-06
Payer: MEDICAID

## 2024-07-06 DIAGNOSIS — F32.A DEPRESSION, UNSPECIFIED DEPRESSION TYPE: Primary | ICD-10-CM

## 2024-07-25 ENCOUNTER — OFFICE VISIT (OUTPATIENT)
Dept: NEUROLOGY | Facility: CLINIC | Age: 29
End: 2024-07-25
Payer: MEDICAID

## 2024-07-25 VITALS
HEIGHT: 70 IN | HEART RATE: 85 BPM | BODY MASS INDEX: 24.91 KG/M2 | SYSTOLIC BLOOD PRESSURE: 118 MMHG | OXYGEN SATURATION: 99 % | DIASTOLIC BLOOD PRESSURE: 74 MMHG | WEIGHT: 174 LBS

## 2024-07-25 DIAGNOSIS — R56.9 NEW ONSET SEIZURE: Primary | ICD-10-CM

## 2024-07-25 NOTE — PROGRESS NOTES
Subjective:    CC: Melani Bhatt is seen today in consultation at the [facility] for Seizures       HPI:  29-year-old female accompanied by her mother with a past medical history of depression presents with new onset seizures.  As per patient she was cooking a meal in the kitchen on the morning of 6/10 when she went down to sit.  Soon afterwards her mother noticed that she was slumped over in the chair trying to reach out with her right hand.  When she tried to straighten her up the patient stiffened up and started to have whole body shaking.  The episode lasted for a few minutes.  She denies any tongue bite or bowel/bladder incontinence.  By the time EMS arrived patient was getting back to her normal self answering questions although she has no recollection of this.  In the ER while talking to the physician she had another generalized tonic-clonic seizure.  Was extremely agitated and combative after that for about 30 minutes and had to be given IV sedation including Versed several times.  She had a CT head and MRI brain both of which did not show any significant chronic ischemic changes, mesial temporal sclerosis or acute intracranial abnormalities.  Also had an EEG that showed mild generalized slowing as well as excessive beta activity but no epileptiform activity.  In addition she had a lumbar puncture that was unremarkable.  Her white count was slightly elevated at 12.1 and calcium was 8.1.  U tox was positive for THC and benzos.  Alcohol was negative even though patient states that she typically has 3-4 alcoholic drinks twice a week on her days off (Sunday and Monday).  Does not remember if she had any alcohol the night before or not. Also denies being sick or sleep deprived.  She was started on Keppra 500 mg twice daily that she has continued to take without any adverse effects.  A few months prior to these episodes she started to have brief episodes of anxiety where she has facial flushing, feels  nauseous with a sense of déjà vu lasting for about 1 to 2 minutes.  These were occurring about once a month but since her seizures in June have now been occurring about once a week.  Past history-mom denies any abnormal birth history (she was born 3 weeks prematurely but was sent home immediately), family history of seizures, concussions or febrile seizures as a child  Of note-I personally reviewed her MRI brain and the hospital notes    The following portions of the patient's history were reviewed today and updated as of 07/25/2024  : allergies, current medications, past family history, past medical history, past social history, past surgical history, and problem list  These document will be scanned to patient's chart.      Current Outpatient Medications:     levETIRAcetam (KEPPRA) 500 MG tablet, Take 1 tablet by mouth Every 12 (Twelve) Hours., Disp: 60 tablet, Rfl: 1    multivitamin (MULTI-VITAMIN DAILY PO), Take  by mouth Daily., Disp: , Rfl:     norgestimate-ethinyl estradiol (ORTHO-CYCLEN) 0.25-35 MG-MCG per tablet, Take 1 tablet by mouth Daily. Take in a continuous fashion, skipping placebos, Disp: 112 tablet, Rfl: 4    sertraline (ZOLOFT) 50 MG tablet, Take 1 tablet by mouth Daily., Disp: 30 tablet, Rfl: 3   Past Medical History:   Diagnosis Date    Acne     Allergic     Depression     Genital herpes     Heart murmur     as a child    Herpes genitalis     Irregular menses     Ovarian cyst     Pap smear for cervical cancer screening 08/2016    Seizures     Vegan diet       Past Surgical History:   Procedure Laterality Date    WISDOM TOOTH EXTRACTION      x4      Family History   Problem Relation Age of Onset    Breast cancer Mother     Arthritis Father     Hypertension Father     Arthritis Maternal Grandmother     Breast cancer Maternal Grandmother     Hypertension Maternal Grandmother     Hypertension Maternal Grandfather     Arthritis Paternal Grandmother     Ovarian cancer Paternal Grandmother     Breast  "cancer Paternal Grandmother     Heart attack Paternal Grandmother     Hypertension Paternal Grandmother     Dementia Paternal Grandmother     Hypertension Paternal Grandfather     Stroke Paternal Grandfather     Kidney cancer Other       Social History     Socioeconomic History    Marital status: Single   Tobacco Use    Smoking status: Never     Passive exposure: Never    Smokeless tobacco: Never   Vaping Use    Vaping status: Never Used   Substance and Sexual Activity    Alcohol use: Yes     Alcohol/week: 8.0 standard drinks of alcohol     Types: 8 Glasses of wine per week    Drug use: No    Sexual activity: Yes     Partners: Female, Male     Birth control/protection: Condom, OCP     Review of Systems    Objective:    /74   Pulse 85   Ht 177.8 cm (70\")   Wt 78.9 kg (174 lb)   SpO2 99%   BMI 24.97 kg/m²     Neurology Exam:    General apperance: NAD.     Mental status: Alert, awake and oriented to time place and person.    Recent and Remote memory: Intact.    Attention span and Concentration: Normal.     Language and Speech: Intact- No dysarthria.    Fluency, Naming , Repitition and Comprehension:  Intact    Cranial Nerves:   CN II: Visual fields are full. Intact. Fundi - Normal, No papillederma, Pupils - AUDI  CN III, IV and VI: Extraocular movements are intact. Normal saccades.   CN V: Facial sensation is intact.   CN VII: Muscles of facial expression reveal no asymmetry. Intact.   CN VIII: Hearing is intact. Whispered voice intact.   CN IX and X: Palate elevates symmetrically. Intact  CN XI: Shoulder shrug is intact.   CN XII: Tongue is midline without evidence of atrophy or fasciculation.     Ophthalmoscopic exam of optic disc-normal    Motor:  Right UE muscle strength 5/5. Normal tone.     Left UE muscle strength 5/5. Normal tone.      Right LE muscle strength5/5. Normal tone.     Left LE muscle strength 5/5. Normal tone.      Sensory: Normal light touch, vibration and pinprick sensation " bilaterally.    DTRs: 2+ bilaterally in upper and lower extremities.    Babinski: Negative bilaterally.    Co-ordination: Normal finger-to-nose, heel to shin B/L.    Rhomberg: Negative.    Gait: Normal.    Cardiovascular: Regular rate and rhythm without murmur, gallop or rub.    Assessment and Plan:  1. New onset seizure  Based on her episode semiology she could have focal seizures with secondary generalization.  The episodes that she is having now could be simple focal seizures.  MRI brain and routine EEG were unremarkable  I will get a longer sleep deprived video EEG and will increase her dose of Keppra after the EEG is completed  Till then she can continue her dose of 500 mg twice daily.  May take an extra dose if she feels her auras  Counseling given on seizure precautions, medication compliance and avoiding binge alcohol drinking    - EEG Continuous Monitoring With Video; Future       Return in about 2 months (around 9/25/2024).     I spent over 40 minutes with the patient face to face out of which over 50% (30 minutes) was spent in management, instructions and education.     Eileen Hurst MD

## 2024-08-08 RX ORDER — LEVETIRACETAM 500 MG/1
500 TABLET ORAL EVERY 12 HOURS SCHEDULED
Qty: 60 TABLET | Refills: 1 | OUTPATIENT
Start: 2024-08-08

## 2024-08-08 NOTE — TELEPHONE ENCOUNTER
Last filled 6/12/24 - [facility]   levETIRAcetam (KEPPRA) 500 MG    60 w/ 1 refills    LOV:  06/17/24  NOV: 09/16/24

## 2024-08-09 NOTE — TELEPHONE ENCOUNTER
Called and left VM with provider message as well as message to return call to office.       Called Pharmacy to see if Dr. Hurst  had filled prescription. Per Pharmacist last time prescription was filled was July 6.

## 2024-08-09 NOTE — TELEPHONE ENCOUNTER
MEDICATION REFILL REQUEST    Caller: LUCATANYA    Relationship: Mother    Best call back number: [phone]    Requested Prescriptions:   Requested Prescriptions     Pending Prescriptions Disp Refills    levETIRAcetam (KEPPRA) 500 MG tablet 60 tablet 1     Sig: Take 1 tablet by mouth Every 12 (Twelve) Hours.      Pharmacy where request should be sent: [facility]/PHARMACY #[id number] - [place], [place] - [address] [phone] [facility] [phone]      Last office visit with prescribing clinician: 7/25/2024   Last telemedicine visit with prescribing clinician: Visit date not found   Next office visit with prescribing clinician: 9/30/2024     Additional details provided by patient: PT'S MOTHER STATES PT HAS LESS THAN A 3-DAY SUPPLY OF THE KEPPRA MEDICATION; MEDICATION WAS PRESCRIBED WHILE PT WAS ADMITTED TO THE HOSPITAL; SHE ASKS IF DR. NAIR WILL TAKE OVER THE RX.    Does the patient have less than a 3 day supply?:  [x] Yes  [] No    Would you like a call back once the refill request has been completed?: [] Yes  [x] No    If the office needs to give you a call back, can they leave a voicemail?: [] Yes  [x] No    PLEASE REVIEW AND ADVISE.    Pablo Hanson Rep   08/09/24 13:03 EDT

## 2024-08-12 RX ORDER — LEVETIRACETAM 500 MG/1
500 TABLET ORAL EVERY 12 HOURS SCHEDULED
Qty: 60 TABLET | Refills: 1 | Status: SHIPPED | OUTPATIENT
Start: 2024-08-12

## 2024-08-19 ENCOUNTER — HOSPITAL ENCOUNTER (OUTPATIENT)
Dept: NEUROLOGY | Facility: HOSPITAL | Age: 29
Discharge: HOME | End: 2024-08-19
Admitting: PSYCHIATRY & NEUROLOGY
Payer: MEDICAID

## 2024-08-19 DIAGNOSIS — R56.9 NEW ONSET SEIZURE: ICD-10-CM

## 2024-08-19 PROCEDURE — 95713 VEEG 2-12 HR CONT MNTR: CPT

## 2024-08-22 ENCOUNTER — TELEPHONE (OUTPATIENT)
Dept: NEUROLOGY | Facility: CLINIC | Age: 29
End: 2024-08-22
Payer: MEDICAID

## 2024-08-22 NOTE — TELEPHONE ENCOUNTER
Spoke with Melani, per Dr. Hurst EEG is normal but she should continue Keppra as is. Verbalized understanding and no further questions at this time, will call back if any.

## 2024-08-26 ENCOUNTER — OFFICE VISIT (OUTPATIENT)
Dept: BEHAVIORAL HEALTH | Facility: CLINIC | Age: 29
End: 2024-08-26
Payer: MEDICAID

## 2024-08-26 DIAGNOSIS — F41.1 GAD (GENERALIZED ANXIETY DISORDER): Primary | ICD-10-CM

## 2024-08-26 DIAGNOSIS — F33.2 SEVERE EPISODE OF RECURRENT MAJOR DEPRESSIVE DISORDER, WITHOUT PSYCHOTIC FEATURES: ICD-10-CM

## 2024-08-26 PROCEDURE — 90791 PSYCH DIAGNOSTIC EVALUATION: CPT | Performed by: SOCIAL WORKER

## 2024-08-26 NOTE — PROGRESS NOTES
[facility] [place]                  Initial Assessment      Initial Adult Note     Date:2024   Patient Name: Melani Bhatt  : 1995   MRN: [id number]   Time IN: 1:20 pm    Time OUT: 2:08 pm     Referring Provider: Carmen Lozano PA-C    Chief Complaint:      ICD-10-CM ICD-9-CM   1. LORE (generalized anxiety disorder)  F41.1 300.02   2. Severe episode of recurrent major depressive disorder, without psychotic features  F33.2 296.33        History of Present Illness:   Melani Bhatt is a 29 y.o. female who is being seen today for counseling for anxiety and depression.      Past Psychiatric History:   Past outpatient services.  Patient reports the experiences were not helpful.    Subjective      Review of Systems    PHQ-9: Brief Depression Severity Measure Score: 11    LORE 7 Total Score: 12    Patient's Support Network Includes:  parents    Functional Status: Mild impairment     Significant Life Events:   Verbal, physical, sexual abuse? No  Has patient experienced a death / loss of relationship? Yes  Has patient experienced a major accident or tragic events? Yes    Work History:   Highest level of education obtained: college  Ever been active duty in the ? No  Patient's Occupation: Works for [employer] in [employer]    Interpersonal/Relational:  Marital Status: single  Support system: two parent,  family and friends    Mental/Behavioral Health History:  History of prior treatment or hospitalization: Outpatient behavioral health services  Past diagnoses: Anxiety and depression  Are there any significant health issues (current or past): See problem list  History of seizures: No    Family Psychiatric History:  None reported    History of Substance Use:  Patient answered: Patient uses delta 8 or delta 9 THC regularly.  Patient describes himself as a social drinker.    Triggers:  (Persons/Places/Things/Events/Thought/Emotions): Personal health and the health of loved ones, mistreatment of animals, stated the environment, political and stable on rest welfare of family and friends.    Social History     Socioeconomic History    Marital status: Single   Tobacco Use    Smoking status: Never     Passive exposure: Never    Smokeless tobacco: Never   Vaping Use    Vaping status: Never Used   Substance and Sexual Activity    Alcohol use: Yes     Alcohol/week: 8.0 standard drinks of alcohol     Types: 8 Glasses of wine per week    Drug use: No    Sexual activity: Yes     Partners: Female, Male     Birth control/protection: Condom, OCP        Past Medical History:   Diagnosis Date    Acne     Allergic     Depression     Genital herpes     Heart murmur     as a child    Herpes genitalis     Irregular menses     Ovarian cyst     Pap smear for cervical cancer screening 08/2016    Seizures     Vegan diet        Past Surgical History:   Procedure Laterality Date    WISDOM TOOTH EXTRACTION      x4       Family History   Problem Relation Age of Onset    Breast cancer Mother     Arthritis Father     Hypertension Father     Arthritis Maternal Grandmother     Breast cancer Maternal Grandmother     Hypertension Maternal Grandmother     Hypertension Maternal Grandfather     Arthritis Paternal Grandmother     Ovarian cancer Paternal Grandmother     Breast cancer Paternal Grandmother     Heart attack Paternal Grandmother     Hypertension Paternal Grandmother     Dementia Paternal Grandmother     Hypertension Paternal Grandfather     Stroke Paternal Grandfather     Kidney cancer Other          Current Outpatient Medications:     levETIRAcetam (KEPPRA) 500 MG tablet, Take 1 tablet by mouth Every 12 (Twelve) Hours., Disp: 60 tablet, Rfl: 1    multivitamin (MULTI-VITAMIN DAILY PO), Take  by mouth Daily., Disp: , Rfl:     norgestimate-ethinyl estradiol (ORTHO-CYCLEN) 0.25-35 MG-MCG per tablet, Take 1 tablet by mouth  Daily. Take in a continuous fashion, skipping placebos, Disp: 112 tablet, Rfl: 4    sertraline (ZOLOFT) 50 MG tablet, Take 1 tablet by mouth Daily., Disp: 30 tablet, Rfl: 3    No Known Allergies    Objective     Physical Exam:  Vital Signs: There were no vitals filed for this visit.  There is no height or weight on file to calculate BMI.     Physical Exam    Mental Status Exam:   Hygiene:   good  Cooperation:  Cooperative  Eye Contact:  Good  Psychomotor Behavior:  Appropriate  Affect:  Full range  Mood: normal  Speech:  Normal  Thought Process:  Goal directed  Thought Content:  Normal  Suicidal:  None  Homicidal:  None  Hallucinations:  None  Delusion:  None  Memory:  Intact  Orientation:  Person, Place, Time, and Situation  Reliability:  good  Insight:  Good  Judgement:  Good  Impulse Control:  Good  Physical/Medical Issues:   See problem list      SUICIDE RISK ASSESSMENT/CSSRS:  1. Does patient have thoughts of suicide? no  2. Does patient have intent for suicide? no  3. Does patient have a current plan for suicide? no  4. History of suicide attempts: no  5. Family history of suicide or attempts: no  6. History of violent behaviors towards others or property or thoughts of committing suicide: no  7. History of sexual aggression toward others: no  8. Access to firearms or weapons: no    Assessment / Plan      Diagnosis  Diagnoses and all orders for this visit:    1. LORE (generalized anxiety disorder) (Primary)  -     Ambulatory Referral to Psychiatry    2. Severe episode of recurrent major depressive disorder, without psychotic features  -     Ambulatory Referral to Psychiatry    Patient presented for intake with clinician.  Patient states that they currently reside in [place] with their parents.  Patient reports a stable and supportive relationship with her parents.  Patient reports that they are currently employed by the [employer] and [employer] in [facility].  Patient reports receiving  their degree in environmental science from [employer].  Patient reports presenting for treatment due to being overwhelmed by their present circumstances.  Patient reports having 2 seizures since June.  Patient states that he currently cannot improve as a result of the seizures and are being driven to and from work by their parents and/or family friends.  Patient states that they often feel that they are not independent enough due to their current situation.  Patient reports that the cause of the seizures elevated to medical professionals despite several visits to their PCP and neurology.  Patient states they suspect alcohol as being the primary cause for the seizures.  Patient states they often see alcohol auras on days following consuming 4 or more drinks of alcohol.  Patient reports that they are currently overwhelmed by work.  Patient states they have a good working relationship with coworkers but that their boss is not a good communicator and does not advocate for them.  Patient states they are currently single.  Patient states they are very frustrated with the current political climate and status of world events.  Patient states that they are extremely concerned about the environment and the future of the ecosystem.  Patient reports current social alcohol use.  Patient reports regular THC use since the age of 19.  Patient reports using approximately 300 mg of delta 8 or 9 THC daily.  Patient reports relief from the use of the THC presented as relaxation and clear headedness.  Symptoms of depression to note are as follows: Mild anhedonia, feeling down depressed and/or hopeless, chronic fatigue, poor appetite, low self-esteem reinforced by the belief that they are not independent of due to living with their parents, trouble concentrating.  Symptoms of anxiety to note are as follows: Nervousness or anxiousness, uncontrollable worry, worrying too much about different things, trouble relaxing, restlessness,  becoming easily annoyed or irritated and catastrophization.    Prognosis  Dependent upon continuity of treatment    Progress toward goal: Fair with ongoing treatment      PLAN:  Clinician introduced a cognitive behavioral intervention which focuses on self-care and mindfulness.  Patient was receptive to intervention.  Follow-ups will occur approximately every 21 days will last from 45 to 60 minutes in duration.    Safety: No acute safety concerns  Risk Assessment: Risk of self-harm acutely is low. Risk of self-harm chronically is also low, but could be further elevated in the event of treatment noncompliance and/or AODA.    Treatment Plan/Goals: Continue supportive psychotherapy efforts and medications as indicated. Treatment and medication options discussed during today's visit. Patient ackowledged and verbally consented to continue with current treatment plan and was educated on the importance of compliance with treatment and follow-up appointments. Patient seems reasonably able to adhere to treatment plan.      Assisted Patient in processing above session content; acknowledged and normalized patient’s thoughts, feelings, and concerns.  Rationalized patient thought process regarding anxiety and depression.      Allowed Patient to freely discuss issues  without interruption or judgement with unconditional positive regard, active listening skills, and empathy. Therapist provided a safe, confidential environment to facilitate the development of a positive therapeutic relationship and encouraged open, honest communication. Assisted Patient in identifying risk factors which would indicate the need for higher level of care including thoughts to harm self or others and/or self-harming behavior and encouraged Patient to contact this office, call 911, or present to the nearest emergency room should any of these events occur. Discussed crisis intervention services and means to access. Patient adamantly and convincingly  denies current suicidal or homicidal ideation or perceptual disturbance. Assisted Patient in processing session content; acknowledged and normalized Patient’s thoughts, feelings, and concerns by utilizing a person-centered approach in efforts to build appropriate rapport and a positive therapeutic relationship with open and honest communication.     Part of this note may be an electronic transcription/translation of spoken language to printed text using the Dragon Dictation System.       Follow Up:   Return in about 3 weeks (around 9/16/2024) for Next scheduled follow up.    Andres Padilla LCSW

## 2024-08-29 ENCOUNTER — TELEPHONE (OUTPATIENT)
Dept: OBSTETRICS AND GYNECOLOGY | Facility: CLINIC | Age: 29
End: 2024-08-29
Payer: MEDICAID

## 2024-08-29 NOTE — TELEPHONE ENCOUNTER
Returned patient's call. Informed her she has refills for OCPs available; she can contact her pharmacy to refill. She v/u and agreed.

## 2024-08-29 NOTE — TELEPHONE ENCOUNTER
norgestimate-ethinyl estradiol (ORTHO-CYCLEN) 0.25-35 MG-MCG  PT is calling to refill this script.

## 2024-09-06 RX ORDER — LEVETIRACETAM 500 MG/1
500 TABLET ORAL EVERY 12 HOURS SCHEDULED
Qty: 60 TABLET | Refills: 5 | Status: SHIPPED | OUTPATIENT
Start: 2024-09-06

## 2024-09-06 NOTE — TELEPHONE ENCOUNTER
Rx Refill Note  Requested Prescriptions     Pending Prescriptions Disp Refills    levETIRAcetam (KEPPRA) 500 MG tablet 60 tablet 1     Sig: Take 1 tablet by mouth Every 12 (Twelve) Hours.      Last filled:08/12/24  Last office visit with prescribing clinician: 7/25/2024      Next office visit with prescribing clinician: 9/30/2024     Breanne Cardenas MA  09/06/24, 14:43 EDT    Sent the Rx to patients pharmacy with 5 refills.

## 2024-09-09 ENCOUNTER — OFFICE VISIT (OUTPATIENT)
Dept: BEHAVIORAL HEALTH | Facility: CLINIC | Age: 29
End: 2024-09-09
Payer: MEDICAID

## 2024-09-09 VITALS
SYSTOLIC BLOOD PRESSURE: 116 MMHG | WEIGHT: 168.2 LBS | HEIGHT: 70 IN | DIASTOLIC BLOOD PRESSURE: 72 MMHG | BODY MASS INDEX: 24.08 KG/M2 | OXYGEN SATURATION: 98 % | HEART RATE: 91 BPM

## 2024-09-09 DIAGNOSIS — F33.1 MODERATE EPISODE OF RECURRENT MAJOR DEPRESSIVE DISORDER: Primary | ICD-10-CM

## 2024-09-09 RX ORDER — FLUOXETINE 10 MG/1
10 CAPSULE ORAL DAILY
Qty: 30 CAPSULE | Refills: 0 | Status: SHIPPED | OUTPATIENT
Start: 2024-09-09

## 2024-09-09 NOTE — PROGRESS NOTES
New Patient Office Visit      Patient Name: Melani Bhatt  : 1995   MRN: [id number]     Referring Provider: Carmen Lozano PA-C    Chief Complaint:      ICD-10-CM ICD-9-CM   1. Moderate episode of recurrent major depressive disorder  F33.1 296.32        History of Present Illness:   Melani Bhatt is a 29 y.o. female who is here today for evaluation and medication management. Patient reports  Panic attacks, cries so much that she can't breathe-last break up, summer 2022  History of Present Illness  The patient presents for evaluation of depression.    She has been dealing with depression for most of her life without any formal treatment. Her therapeutic journey began in  following a significant breakup, during which she also started medication. She has a history of therapy, which she discontinued after an unsatisfactory initial session with And[facility]. She is currently seeking a new therapist. Despite a supportive home environment and close friends, she continues to struggle with her mood, which she describes as rarely good or bad, but often just okay. She has never had suicidal thoughts but has experienced self-harm ideation in high school. She has had panic attacks characterized by intense crying and difficulty breathing. She has not been hospitalized for psychiatric reasons. She has no history of trauma or abuse. She denies current suicidal thoughts, thoughts of harming others, or hallucinations. She occasionally gets stuck in obsessive thoughts but reports no eating disorders. She is interested in trying Wellbutrin to reduce alcohol cravings.    She is currently on Zoloft, which she believes may be affecting her mood along with other medications. She has been on Zoloft since the end of , initially at 100 mg, then reduced to 50 mg. She reports that Zoloft causes her to clench her jaw and that her teeth are in poor condition. Her current medication regimen includes Zoloft, Ortho-Cyclen,  Keppra 500 mg twice daily, vitamins, and Allegra for allergies. She was informed by her pharmacist that Keppra could induce depressive thoughts and episodes, adding to her confusion. She has discussed her premenstrual dysphoric disorder (PMDD) and mood fluctuations with her OB/GYN but feels dismissed.    She consumes alcohol and marijuana, which she believes helps her social interactions at work. She works full-time as a , which leaves her little time for exercise and meal preparation. She is dissatisfied with her diet and digestion. She has difficulty eating sweets and experiences alternating constipation and diarrhea, nausea, and occasional vomiting. She has been experiencing interrupted sleep, waking up early and unable to return to sleep. She suspects her evening drinking may be contributing to this. She has noticed that dehydration in the morning can trigger seizures. She reports no other significant medical issues and is a . She enjoys her job and lives with her parents. She has no legal issues related to alcohol use.    She had a seizure and has seen her neurologist for the seizures. She has noticed that dehydration in the morning can trigger seizures.    She denies having weapons at home or engaging in risky behaviors. She reports no history of smoking. She experiences headaches about once a week, for which she takes Advil or Aleve.    SOCIAL HISTORY  She drinks beer with dinner every night and probably 2 or 3 times a week it turns into 3 or 4 drinks. She takes 150 of the delta-9 at work about 3 or 4 times a week because it makes her talkative and much more tolerant of interacting with people. She does not smoke tobacco. She has a cup of coffee in the morning and maybe once or twice a week she will have another in the afternoon. She is a . She works as a  5 days a week.     FAMILY HISTORY  Her middle half-sister who is closest with her has  just recently been diagnosed with ADHD and she has also struggled with alcohol. Her dad has been to therapy before, but she does not think her mom or her dad or anyone else in the family have been diagnosed with any mental health conditions. Her great-great grandfather may have  by suicide. Her dad drinks about everyday. Her mom used to always have a glass of wine with dinner.       Current Treatments: see problem list  Medications: see medication record on file  Therapy: history, not current     Subjective      Review of Systems:   Review of Systems   Constitutional:  Negative for appetite change and unexpected weight change.   Eyes:  Negative for visual disturbance.   Respiratory:  Negative for chest tightness and shortness of breath.    Cardiovascular:  Negative for chest pain.   Musculoskeletal:  Negative for gait problem.   Skin:  Negative for rash and wound.   Neurological:  Positive for seizures. Negative for dizziness, tremors, weakness and light-headedness.   Psychiatric/Behavioral:  Positive for dysphoric mood and sleep disturbance. Negative for agitation, behavioral problems, confusion, decreased concentration, hallucinations, self-injury and suicidal ideas. The patient is nervous/anxious. The patient is not hyperactive.      seizures  Sleep pattern: interrupted  Appetite: not eating well, N/V     Past Medical History:   Past Medical History:   Diagnosis Date    Acne     Allergic     Depression     Genital herpes     Heart murmur     as a child    Herpes genitalis     Irregular menses     Ovarian cyst     Pap smear for cervical cancer screening 2016    Seizures     Vegan diet        Past Surgical History:   Past Surgical History:   Procedure Laterality Date    WISDOM TOOTH EXTRACTION      x4       Family History:   Family History   Problem Relation Age of Onset    Breast cancer Mother     Arthritis Father     Hypertension Father     Arthritis Maternal Grandmother     Breast cancer Maternal  Grandmother     Hypertension Maternal Grandmother     Hypertension Maternal Grandfather     Arthritis Paternal Grandmother     Ovarian cancer Paternal Grandmother     Breast cancer Paternal Grandmother     Heart attack Paternal Grandmother     Hypertension Paternal Grandmother     Dementia Paternal Grandmother     Hypertension Paternal Grandfather     Stroke Paternal Grandfather     Kidney cancer Other        PHQ-9 Depression Screening  Little interest or pleasure in doing things? 1-->several days   Feeling down, depressed, or hopeless? 1-->several days   Trouble falling or staying asleep, or sleeping too much? 1-->several days   Feeling tired or having little energy? 1-->several days   Poor appetite or overeating? 1-->several days   Feeling bad about yourself - or that you are a failure or have let yourself or your family down? 1-->several days   Trouble concentrating on things, such as reading the newspaper or watching television? 1-->several days   Moving or speaking so slowly that other people could have noticed? Or the opposite - being so fidgety or restless that you have been moving around a lot more than usual? 0-->not at all   Thoughts that you would be better off dead, or of hurting yourself in some way? 0-->not at all   PHQ-9 Total Score 7   If you checked off any problems, how difficult have these problems made it for you to do your work, take care of things at home, or get along with other people? very difficult     LORE-7 Anxiety Screening  Over the last two weeks, how often have you been bothered by the following problems?  Feeling nervous, anxious or on edge: More than half the days  Not being able to stop or control worrying: More than half the days  Worrying too much about different things: More than half the days  Trouble Relaxing: More than half the days  Being so restless that it is hard to sit still: Several days  Becoming easily annoyed or irritable: Nearly every day  Feeling afraid as if  something awful might happen: Several days  LORE 7 Total Score: 13  If you checked any problems, how difficult have these problems made it for you to do your work, take care of things at home, or get along with other people: Very difficult    Psychiatric History:     Previous medications: zoloft did go up to 100 mg  Inpatient admissions: denies  History of suicide/self harm attempts: dug nails into her arms in high school, occurred for less than a month  Trauma/Abuse History: last relationship, not feeling heard or seen  Developmental History: on target, a lot of issues in early elementary school  Patient denies any history of parul or hypomania. No thought disturbances or psychosis reported with no thought blocking or thought broadcasting noted. No history of eating disorders, ADHD or OCD. Patient reports symptoms of depression including hopelessness, helplessness, and anhedonia. History of self-harm behavior once in high school, but no suicidal ideation. She denies any homicidal thinking. No history of abuse (verbal, physical or sexual) and denies any symptoms of PTSD.  No personality disorders noted at this time.    RISK ASSESSMENT:    Does patient have intent for suicide? denies  Does patient have thoughts of suicide? denies  Does patient have a current plan for suicide? denies  Access to firearms or weapons: denies  History of suicide attempts: denies  History of homicidal ideation: denies  Family history of suicide or attempts: great-great grandfather  Risk Taking/Impulsive Behavior (current or past): current Describe:  meeting people online  Protective factors: family    Social History:    Highest level of education obtained: environmental science  Living situation: with parents  Patient's Occupation: , 5 days a week, commutes extra 2 hours,   Leisure and Recreation:  animals, plants, people in her life, [place], sunshine, water, earth, air  Support system: family  Illicit  "substance use: delta 8/9, helps with work, makes her talkative and interact with other people  Alcohol use: a beer with dinner every night, 2-3 nights it turns into 3-4 drinks  Tobacco use: denies  Coffee: 1-2 times a day    Legal History:   No legal history noted today.     Medications:     Current Outpatient Medications:     FLUoxetine (PROzac) 10 MG capsule, Take 1 capsule by mouth Daily., Disp: 30 capsule, Rfl: 0    levETIRAcetam (KEPPRA) 500 MG tablet, Take 1 tablet by mouth Every 12 (Twelve) Hours., Disp: 60 tablet, Rfl: 5    multivitamin (MULTI-VITAMIN DAILY PO), Take  by mouth Daily., Disp: , Rfl:     norgestimate-ethinyl estradiol (ORTHO-CYCLEN) 0.25-35 MG-MCG per tablet, Take 1 tablet by mouth Daily. Take in a continuous fashion, skipping placebos, Disp: 112 tablet, Rfl: 4    sertraline (ZOLOFT) 50 MG tablet, Take 1 tablet by mouth Daily., Disp: 30 tablet, Rfl: 3    Medication Considerations:  RAZA reviewed and appropriate.      Allergies:   No Known Allergies    Results  Reviewed most recent labs       Objective     Physical Exam:  Vital Signs:   Vitals:    09/09/24 1612   BP: 116/72   Pulse: 91   SpO2: 98%   Weight: 76.3 kg (168 lb 3.2 oz)   Height: 177.8 cm (70\")     Body mass index is 24.13 kg/m².     Mental Status Exam:   Hygiene:   good  Cooperation:  Cooperative  Eye Contact:  Good  Psychomotor Behavior:  Appropriate  Affect:   tearful  Mood: sad, depressed, and anxious  Speech:  Normal  Thought Process:  Goal directed and Linear  Thought Content:  Normal  Suicidal:  None  Homicidal:  None  Hallucinations:  None  Delusion:  None  Memory:  Intact  Orientation:  Person, Place, Time, and Situation  Reliability:  good  Insight:  Fair  Judgement:  Fair  Impulse Control:  Fair  Physical/Medical Issues:   see problem list      Assessment / Plan      Visit Diagnosis/Orders Placed This Visit:  Diagnoses and all orders for this visit:    1. Moderate episode of recurrent major depressive disorder " (Primary)  -     FLUoxetine (PROzac) 10 MG capsule; Take 1 capsule by mouth Daily.  Dispense: 30 capsule; Refill: 0         Functional Status: No impairment    Prognosis: Good with Ongoing Treatment     Impression/Formulation:  Patient appeared alert and oriented.  Patient is voluntarily requesting to begin outpatient psychiatric treatment at [facility]. Patient is receptive to assistance with maintaining a stable lifestyle.  Patient presents with history of     ICD-10-CM ICD-9-CM   1. Moderate episode of recurrent major depressive disorder  F33.1 296.32   Reviewed patient's previous provider notes. Reviewed most recent labs. Patient meets DSM V diagnostic criteria for diagnoses. Diagnoses may be updated as more information becomes available.    Differential diagnoses include: LORE, alcohol use disorder    Assessment & Plan  1. Depression.  A prescription for Prozac 10 mg will be initiated, while concurrently reducing the Zoloft dosage to 25 mg. She is advised to halve her current Zoloft 50 mg tablets. The potential side effects of Prozac, including gastrointestinal upset and changes in sleep patterns, have been discussed. She is encouraged to contact the crisis line at [phone] should she experience suicidal thoughts. It is recommended that she gradually reduce her alcohol consumption to prevent potential seizures. The [place] outpatient program is suggested as a resource for substance use and abuse. Should she encounter any issues with Prozac, she is advised to discontinue its use.    Follow-up  The patient will follow up in 2 weeks or sooner if needed.    Treatment Plan:   Decrease zoloft to 25 mg daily.   Begin prozac 10 mg daily  Research and begin substance abuse program  Begin individual therapy  Patient will continue supportive psychotherapy efforts and medications as indicated. Clinic will obtain release of information for current treatment team for continuity of care as needed. Patient will  contact this office, call 911 or present to the nearest emergency room should suicidal or homicidal ideations occur. Discussed medication options and treatment plan of prescribed medication(s) as well as the risks, benefits, and potential side effects. Patient acknowledged and verbally consented to continue with current treatment plan and was educated on the importance of compliance with treatment and follow-up appointments.     Patient instructions:   Instructed will take 4-6 weeks for full therapeutic effect. Medication risks and side effects discussed with patient including risk for worsening mood, changes in behavior, thoughts of suicide or homicide, induction of parul, serotonin syndrome, rare hyponatremia, rare SIADH, withdrawal symptoms if abrupt withdrawal, sexual dysfunction.   If any thoughts of SI or HI, worsening mood or changes in behavior, call 911 or crisis line [phone], or go to nearest ER at once.  Pt.verbalizes understanding and consents to treatment with this medication.     Follow Up:   Return in about 2 weeks (around 9/23/2024) for Med Check.      Patient or patient representative verbalized consent for the use of Ambient Listening during the visit with  QUINN Hooker for chart documentation. 9/12/2024  08:32 EDT      QUINN Hooker, [facility]

## 2024-09-12 PROBLEM — F33.1 MODERATE EPISODE OF RECURRENT MAJOR DEPRESSIVE DISORDER: Status: ACTIVE | Noted: 2024-09-12

## 2024-09-13 ENCOUNTER — TELEPHONE (OUTPATIENT)
Dept: NEUROLOGY | Facility: CLINIC | Age: 29
End: 2024-09-13

## 2024-09-13 RX ORDER — LEVETIRACETAM 750 MG/1
750 TABLET ORAL 2 TIMES DAILY
Qty: 60 TABLET | Refills: 5 | Status: SHIPPED | OUTPATIENT
Start: 2024-09-13

## 2024-09-13 NOTE — TELEPHONE ENCOUNTER
Provider: DR NAIR    Caller: PATIENT    Relationship to Patient: SELF    Phone Number: [phone]    Reason for Call: PATIENT STATES THIS MORNING SHE IS REALLY NOT FEELING WELL. STATES SHE HAS HAD MULTIPLE SEIZURE AURAS. WOULD LIKE TO SEE IF SHE NEEDS TO TAKE MORE MEDICATION.    SPOKE WITH KARIS, STATES SHE WILL CHECK WITH MARTIR & CALL PATIENT BACK SHORTLY.

## 2024-09-13 NOTE — TELEPHONE ENCOUNTER
Spoke with Dr. Hurst and she increasing her Keppra to 750 mg BID and will discuss using nayzilam with her at her appt at the end of the month.  Patient acknowledged understanding and will call back with any questions or concerns.

## 2024-09-16 ENCOUNTER — LAB (OUTPATIENT)
Dept: LAB | Facility: HOSPITAL | Age: 29
End: 2024-09-16
Payer: MEDICAID

## 2024-09-16 ENCOUNTER — OFFICE VISIT (OUTPATIENT)
Dept: INTERNAL MEDICINE | Facility: CLINIC | Age: 29
End: 2024-09-16
Payer: MEDICAID

## 2024-09-16 VITALS
HEIGHT: 70 IN | WEIGHT: 168.2 LBS | RESPIRATION RATE: 16 BRPM | TEMPERATURE: 97.2 F | OXYGEN SATURATION: 100 % | BODY MASS INDEX: 24.08 KG/M2 | HEART RATE: 66 BPM

## 2024-09-16 DIAGNOSIS — E83.51 HYPOCALCEMIA: ICD-10-CM

## 2024-09-16 DIAGNOSIS — Z23 NEED FOR INFLUENZA VACCINATION: ICD-10-CM

## 2024-09-16 DIAGNOSIS — D50.8 IRON DEFICIENCY ANEMIA SECONDARY TO INADEQUATE DIETARY IRON INTAKE: ICD-10-CM

## 2024-09-16 DIAGNOSIS — F33.1 MODERATE EPISODE OF RECURRENT MAJOR DEPRESSIVE DISORDER: ICD-10-CM

## 2024-09-16 DIAGNOSIS — Z00.00 ANNUAL PHYSICAL EXAM: Primary | ICD-10-CM

## 2024-09-16 DIAGNOSIS — R56.9 SEIZURES: ICD-10-CM

## 2024-09-16 PROCEDURE — 99395 PREV VISIT EST AGE 18-39: CPT

## 2024-09-16 PROCEDURE — 80053 COMPREHEN METABOLIC PANEL: CPT

## 2024-09-16 PROCEDURE — 2014F MENTAL STATUS ASSESS: CPT

## 2024-09-16 PROCEDURE — 1126F AMNT PAIN NOTED NONE PRSNT: CPT

## 2024-09-16 PROCEDURE — 1160F RVW MEDS BY RX/DR IN RCRD: CPT

## 2024-09-16 PROCEDURE — 85025 COMPLETE CBC W/AUTO DIFF WBC: CPT

## 2024-09-16 PROCEDURE — 90656 IIV3 VACC NO PRSV 0.5 ML IM: CPT

## 2024-09-16 PROCEDURE — 90471 IMMUNIZATION ADMIN: CPT

## 2024-09-16 PROCEDURE — 1159F MED LIST DOCD IN RCRD: CPT

## 2024-09-17 LAB
ALBUMIN SERPL-MCNC: 4.1 G/DL (ref 3.5–5.2)
ALBUMIN/GLOB SERPL: 1.5 G/DL
ALP SERPL-CCNC: 62 U/L (ref 39–117)
ALT SERPL W P-5'-P-CCNC: 24 U/L (ref 1–33)
ANION GAP SERPL CALCULATED.3IONS-SCNC: 10.9 MMOL/L (ref 5–15)
AST SERPL-CCNC: 30 U/L (ref 1–32)
BASOPHILS # BLD AUTO: 0.03 10*3/MM3 (ref 0–0.2)
BASOPHILS NFR BLD AUTO: 0.3 % (ref 0–1.5)
BILIRUB SERPL-MCNC: <0.2 MG/DL (ref 0–1.2)
BUN SERPL-MCNC: 10 MG/DL (ref 6–20)
BUN/CREAT SERPL: 14.5 (ref 7–25)
CALCIUM SPEC-SCNC: 9.2 MG/DL (ref 8.6–10.5)
CHLORIDE SERPL-SCNC: 104 MMOL/L (ref 98–107)
CO2 SERPL-SCNC: 23.1 MMOL/L (ref 22–29)
CREAT SERPL-MCNC: 0.69 MG/DL (ref 0.57–1)
DEPRECATED RDW RBC AUTO: 44 FL (ref 37–54)
EGFRCR SERPLBLD CKD-EPI 2021: 120.7 ML/MIN/1.73
EOSINOPHIL # BLD AUTO: 0.08 10*3/MM3 (ref 0–0.4)
EOSINOPHIL NFR BLD AUTO: 0.9 % (ref 0.3–6.2)
ERYTHROCYTE [DISTWIDTH] IN BLOOD BY AUTOMATED COUNT: 13.5 % (ref 12.3–15.4)
GLOBULIN UR ELPH-MCNC: 2.7 GM/DL
GLUCOSE SERPL-MCNC: 74 MG/DL (ref 65–99)
HCT VFR BLD AUTO: 38.5 % (ref 34–46.6)
HGB BLD-MCNC: 12.4 G/DL (ref 12–15.9)
IMM GRANULOCYTES # BLD AUTO: 0.03 10*3/MM3 (ref 0–0.05)
IMM GRANULOCYTES NFR BLD AUTO: 0.3 % (ref 0–0.5)
LYMPHOCYTES # BLD AUTO: 2.32 10*3/MM3 (ref 0.7–3.1)
LYMPHOCYTES NFR BLD AUTO: 25.9 % (ref 19.6–45.3)
MCH RBC QN AUTO: 29 PG (ref 26.6–33)
MCHC RBC AUTO-ENTMCNC: 32.2 G/DL (ref 31.5–35.7)
MCV RBC AUTO: 90.2 FL (ref 79–97)
MONOCYTES # BLD AUTO: 0.76 10*3/MM3 (ref 0.1–0.9)
MONOCYTES NFR BLD AUTO: 8.5 % (ref 5–12)
NEUTROPHILS NFR BLD AUTO: 5.74 10*3/MM3 (ref 1.7–7)
NEUTROPHILS NFR BLD AUTO: 64.1 % (ref 42.7–76)
NRBC BLD AUTO-RTO: 0 /100 WBC (ref 0–0.2)
PLATELET # BLD AUTO: 312 10*3/MM3 (ref 140–450)
PMV BLD AUTO: 10.7 FL (ref 6–12)
POTASSIUM SERPL-SCNC: 4.3 MMOL/L (ref 3.5–5.2)
PROT SERPL-MCNC: 6.8 G/DL (ref 6–8.5)
RBC # BLD AUTO: 4.27 10*6/MM3 (ref 3.77–5.28)
SODIUM SERPL-SCNC: 138 MMOL/L (ref 136–145)
WBC NRBC COR # BLD AUTO: 8.96 10*3/MM3 (ref 3.4–10.8)

## 2024-09-20 ENCOUNTER — PATIENT MESSAGE (OUTPATIENT)
Dept: NEUROLOGY | Facility: CLINIC | Age: 29
End: 2024-09-20
Payer: MEDICAID

## 2024-09-23 ENCOUNTER — OFFICE VISIT (OUTPATIENT)
Dept: BEHAVIORAL HEALTH | Facility: CLINIC | Age: 29
End: 2024-09-23
Payer: MEDICAID

## 2024-09-23 VITALS
BODY MASS INDEX: 24.21 KG/M2 | WEIGHT: 169.1 LBS | SYSTOLIC BLOOD PRESSURE: 120 MMHG | OXYGEN SATURATION: 98 % | DIASTOLIC BLOOD PRESSURE: 78 MMHG | HEART RATE: 100 BPM | HEIGHT: 70 IN

## 2024-09-23 DIAGNOSIS — F33.1 MODERATE EPISODE OF RECURRENT MAJOR DEPRESSIVE DISORDER: Primary | ICD-10-CM

## 2024-09-23 PROCEDURE — 99214 OFFICE O/P EST MOD 30 MIN: CPT | Performed by: REGISTERED NURSE

## 2024-09-23 PROCEDURE — 1159F MED LIST DOCD IN RCRD: CPT | Performed by: REGISTERED NURSE

## 2024-09-23 PROCEDURE — 1160F RVW MEDS BY RX/DR IN RCRD: CPT | Performed by: REGISTERED NURSE

## 2024-09-23 RX ORDER — FEXOFENADINE HCL 60 MG/1
60 TABLET, FILM COATED ORAL DAILY
COMMUNITY

## 2024-09-24 ENCOUNTER — TELEPHONE (OUTPATIENT)
Dept: NEUROLOGY | Facility: CLINIC | Age: 29
End: 2024-09-24

## 2024-09-24 NOTE — TELEPHONE ENCOUNTER
Caller: Melani Bhatt    Relationship: Self    Best call back number: [phone]    What form or medical record are you requesting: [employer]IVERS LICENSE PPW    Who is requesting this form or medical record from you: [place] [employer]    How would you like to receive the form or medical records (pick-up, mail, fax): FAX  If fax, what is the fax number: ON PAPERWORK      Timeframe paperwork needed: ASAP    Additional notes: PATIENT BROUGHT DMV PPW TO OFFICE ON 9/16 AND IS CALLING TO CHECK THE STATUS OF THESE FORMS. PLEASE ADVISE.

## 2024-10-01 ENCOUNTER — TELEPHONE (OUTPATIENT)
Dept: NEUROLOGY | Facility: CLINIC | Age: 29
End: 2024-10-01
Payer: MEDICAID

## 2024-10-01 NOTE — TELEPHONE ENCOUNTER
Provider: KOREY    Caller: PATIENT    Relationship to Patient: SELF    Phone Number: [phone]    Reason for Call: PT IS CALLING REGARDING A TEXT FROM MARTIR THIS  MORNING.  PT WOULD LIKE A CALL OR TEXT BACK PLEASE.      THANK YOU

## 2024-10-07 ENCOUNTER — OFFICE VISIT (OUTPATIENT)
Dept: NEUROLOGY | Facility: CLINIC | Age: 29
End: 2024-10-07
Payer: MEDICAID

## 2024-10-07 ENCOUNTER — LAB (OUTPATIENT)
Dept: LAB | Facility: HOSPITAL | Age: 29
End: 2024-10-07
Payer: MEDICAID

## 2024-10-07 VITALS
OXYGEN SATURATION: 99 % | HEART RATE: 88 BPM | DIASTOLIC BLOOD PRESSURE: 76 MMHG | SYSTOLIC BLOOD PRESSURE: 120 MMHG | BODY MASS INDEX: 24.05 KG/M2 | WEIGHT: 168 LBS | HEIGHT: 70 IN

## 2024-10-07 DIAGNOSIS — G40.109 PARTIAL SYMPTOMATIC EPILEPSY WITH SIMPLE PARTIAL SEIZURES, NOT INTRACTABLE, WITHOUT STATUS EPILEPTICUS: Primary | ICD-10-CM

## 2024-10-07 DIAGNOSIS — G40.109 PARTIAL SYMPTOMATIC EPILEPSY WITH SIMPLE PARTIAL SEIZURES, NOT INTRACTABLE, WITHOUT STATUS EPILEPTICUS: ICD-10-CM

## 2024-10-07 PROCEDURE — 1159F MED LIST DOCD IN RCRD: CPT | Performed by: PSYCHIATRY & NEUROLOGY

## 2024-10-07 PROCEDURE — 80177 DRUG SCRN QUAN LEVETIRACETAM: CPT

## 2024-10-07 PROCEDURE — 99214 OFFICE O/P EST MOD 30 MIN: CPT | Performed by: PSYCHIATRY & NEUROLOGY

## 2024-10-07 PROCEDURE — 1160F RVW MEDS BY RX/DR IN RCRD: CPT | Performed by: PSYCHIATRY & NEUROLOGY

## 2024-10-07 PROCEDURE — 36415 COLL VENOUS BLD VENIPUNCTURE: CPT

## 2024-10-07 RX ORDER — MIDAZOLAM 5 MG/.1ML
5 SPRAY NASAL AS NEEDED
Qty: 2 EACH | Refills: 5 | Status: SHIPPED | OUTPATIENT
Start: 2024-10-07

## 2024-10-07 NOTE — PROGRESS NOTES
Subjective:    CC: Melani Bhatt is seen todayl for New onset seizure       Current visit-patient denies having any generalized tonic-clonic seizures since she last saw me with her last episode being on 6/10.  She has however continued to have her auras where she gets a sense of déjà vu with a feeling of facial flushing/nausea.  Along with these auras she has also had occasional word finding difficulties specially at work.  She had her 2-hour sleep deprived video EEG that was normal.  On 9/13 I had increased her Keppra to 750 mg twice daily which she is tolerating well however she did have 1 aura on 9/26 and 2 on 9/27 without any loss of awareness or confusion.  She sleeps well at night and has also cut back on her alcohol intake.    Initial [id number] female accompanied by her mother with a past medical history of depression presents with new onset seizures.  As per patient she was cooking a meal in the kitchen on the morning of 6/10 when she went down to sit.  Soon afterwards her mother noticed that she was slumped over in the chair trying to reach out with her right hand.  When she tried to straighten her up the patient stiffened up and started to have whole body shaking.  The episode lasted for a few minutes.  She denies any tongue bite or bowel/bladder incontinence.  By the time EMS arrived patient was getting back to her normal self answering questions although she has no recollection of this.  In the ER while talking to the physician she had another generalized tonic-clonic seizure.  Was extremely agitated and combative after that for about 30 minutes and had to be given IV sedation including Versed several times.  She had a CT head and MRI brain both of which did not show any significant chronic ischemic changes, mesial temporal sclerosis or acute intracranial abnormalities.  Also had an EEG that showed mild generalized slowing as well as excessive beta activity but no epileptiform activity.  In  addition she had a lumbar puncture that was unremarkable.  Her white count was slightly elevated at 12.1 and calcium was 8.1.  U tox was positive for THC and benzos.  Alcohol was negative even though patient states that she typically has 3-4 alcoholic drinks twice a week on her days off (Sunday and Monday).  Does not remember if she had any alcohol the night before or not. Also denies being sick or sleep deprived.  She was started on Keppra 500 mg twice daily that she has continued to take without any adverse effects.  A few months prior to these episodes she started to have brief episodes of anxiety where she has facial flushing, feels nauseous with a sense of déjà vu lasting for about 1 to 2 minutes.  These were occurring about once a month but since her seizures in June have now been occurring about once a week.  Past history-mom denies any abnormal birth history (she was born 3 weeks prematurely but was sent home immediately), family history of seizures, concussions or febrile seizures as a child  Of note-I personally reviewed her MRI brain and the hospital notes    The following portions of the patient's history were reviewed today and updated as of 07/25/2024  : allergies, current medications, past family history, past medical history, past social history, past surgical history, and problem list  These document will be scanned to patient's chart.      Current Outpatient Medications:     fexofenadine (ALLEGRA) 60 MG tablet, Take 1 tablet by mouth Daily., Disp: , Rfl:     FLUoxetine (PROzac) 20 MG capsule, Take 1 capsule by mouth Daily., Disp: 30 capsule, Rfl: 0    levETIRAcetam (KEPPRA) 750 MG tablet, Take 1 tablet by mouth 2 (Two) Times a Day., Disp: 60 tablet, Rfl: 5    Midazolam (Nayzilam) 5 MG/0.1ML solution, Administer 0.1 mL into the nostril(s) as directed by provider As Needed (At the onset of her seizure.  May repeat once in 10 minutes if needed.)., Disp: 2 each, Rfl: 5    multivitamin (MULTI-VITAMIN  "DAILY PO), Take  by mouth Daily., Disp: , Rfl:     norgestimate-ethinyl estradiol (ORTHO-CYCLEN) 0.25-35 MG-MCG per tablet, Take 1 tablet by mouth Daily. Take in a continuous fashion, skipping placebos, Disp: 112 tablet, Rfl: 4   Past Medical History:   Diagnosis Date    Acne     Allergic     Depression     Genital herpes     Heart murmur     as a child    Herpes genitalis     Irregular menses     Ovarian cyst     Pap smear for cervical cancer screening 08/2016    Seizures     Vegan diet       Past Surgical History:   Procedure Laterality Date    WISDOM TOOTH EXTRACTION      x4      Family History   Problem Relation Age of Onset    Breast cancer Mother     Arthritis Father     Hypertension Father     Arthritis Maternal Grandmother     Breast cancer Maternal Grandmother     Hypertension Maternal Grandmother     Hypertension Maternal Grandfather     Arthritis Paternal Grandmother     Ovarian cancer Paternal Grandmother     Breast cancer Paternal Grandmother     Heart attack Paternal Grandmother     Hypertension Paternal Grandmother     Dementia Paternal Grandmother     Hypertension Paternal Grandfather     Stroke Paternal Grandfather     Kidney cancer Other       Social History     Socioeconomic History    Marital status: Single   Tobacco Use    Smoking status: Never     Passive exposure: Never    Smokeless tobacco: Never   Vaping Use    Vaping status: Former    Start date: 1/1/2020    Quit date: 1/1/2023    Substances: Delta 8   Substance and Sexual Activity    Alcohol use: Yes     Alcohol/week: 12.0 standard drinks of alcohol     Types: 10 Cans of beer, 2 Drinks containing 0.5 oz of alcohol per week    Drug use: No    Sexual activity: Not Currently     Partners: Female, Male     Birth control/protection: Condom, Birth control pill, OCP     Review of Systems   Neurological:  Positive for seizures.   All other systems reviewed and are negative.      Objective:    /76   Pulse 88   Ht 177.8 cm (70\")   Wt 76.2 " kg (168 lb)   LMP 07/29/2024 (Approximate)   SpO2 99%   BMI 24.11 kg/m²     Neurology Exam:    General apperance: NAD.     Mental status: Alert, awake and oriented to time place and person.    Recent and Remote memory: Intact.    Attention span and Concentration: Normal.     Language and Speech: Intact- No dysarthria.    Fluency, Naming , Repitition and Comprehension:  Intact    Cranial Nerves:   CN II: Visual fields are full. Intact. Fundi - Normal, No papillederma, Pupils - AUDI  CN III, IV and VI: Extraocular movements are intact. Normal saccades.   CN V: Facial sensation is intact.   CN VII: Muscles of facial expression reveal no asymmetry. Intact.   CN VIII: Hearing is intact. Whispered voice intact.   CN IX and X: Palate elevates symmetrically. Intact  CN XI: Shoulder shrug is intact.   CN XII: Tongue is midline without evidence of atrophy or fasciculation.     Ophthalmoscopic exam of optic disc-normal    Motor:  Right UE muscle strength 5/5. Normal tone.     Left UE muscle strength 5/5. Normal tone.      Right LE muscle strength5/5. Normal tone.     Left LE muscle strength 5/5. Normal tone.      Sensory: Normal light touch, vibration and pinprick sensation bilaterally.    DTRs: 2+ bilaterally in upper and lower extremities.    Babinski: Negative bilaterally.    Co-ordination: Normal finger-to-nose, heel to shin B/L.    Rhomberg: Negative.    Gait: Normal.    Cardiovascular: Regular rate and rhythm without murmur, gallop or rub.    Assessment and Plan:  1.  Focal epilepsy  Based on her episode semiology she most likely has focal seizures.  Previously MRI brain and routine EEG were unremarkable.  2 hours sleep deprived video EEG was also normal (she did not sleep at all during the test)  I have told her to continue Keppra 750 mg twice daily.  I will check a medication level today and increase her dose if need be as she has had a few simple partial seizures  I will also prescribe her Nayzilam 5 mg to take as  needed at the onset of her aura  Counseling given on seizure precautions, medication compliance and avoiding binge alcohol drinking         Return in about 3 months (around 1/7/2025).     I spent over 25 minutes with the patient face to face out of which over 50% (20 minutes) was spent in management, instructions and education.     Eileen Hurst MD

## 2024-10-11 LAB — LEVETIRACETAM SERPL-MCNC: 25.9 UG/ML (ref 10–40)

## 2024-10-15 RX ORDER — LEVETIRACETAM 1000 MG/1
1000 TABLET ORAL 2 TIMES DAILY
Qty: 60 TABLET | Refills: 6 | Status: SHIPPED | OUTPATIENT
Start: 2024-10-15

## 2024-10-21 ENCOUNTER — OFFICE VISIT (OUTPATIENT)
Dept: BEHAVIORAL HEALTH | Facility: CLINIC | Age: 29
End: 2024-10-21
Payer: MEDICAID

## 2024-10-21 VITALS
OXYGEN SATURATION: 99 % | HEART RATE: 86 BPM | WEIGHT: 165 LBS | HEIGHT: 70 IN | SYSTOLIC BLOOD PRESSURE: 118 MMHG | DIASTOLIC BLOOD PRESSURE: 74 MMHG | BODY MASS INDEX: 23.62 KG/M2

## 2024-10-21 DIAGNOSIS — F41.1 GENERALIZED ANXIETY DISORDER: ICD-10-CM

## 2024-10-21 DIAGNOSIS — F33.1 MODERATE EPISODE OF RECURRENT MAJOR DEPRESSIVE DISORDER: Primary | ICD-10-CM

## 2024-10-21 PROCEDURE — 1159F MED LIST DOCD IN RCRD: CPT | Performed by: REGISTERED NURSE

## 2024-10-21 PROCEDURE — 96127 BRIEF EMOTIONAL/BEHAV ASSMT: CPT | Performed by: REGISTERED NURSE

## 2024-10-21 PROCEDURE — 99214 OFFICE O/P EST MOD 30 MIN: CPT | Performed by: REGISTERED NURSE

## 2024-10-21 PROCEDURE — 1160F RVW MEDS BY RX/DR IN RCRD: CPT | Performed by: REGISTERED NURSE

## 2024-10-21 RX ORDER — FLUOXETINE 40 MG/1
40 CAPSULE ORAL DAILY
Qty: 30 CAPSULE | Refills: 0 | Status: SHIPPED | OUTPATIENT
Start: 2024-10-21

## 2024-10-21 NOTE — PROGRESS NOTES
Follow Up Office Visit      Patient Name: Melani Bhatt  : 1995   MRN: [id number]     Referring Provider: Carmen Lozano PA-C    Chief Complaint:      ICD-10-CM ICD-9-CM   1. Moderate episode of recurrent major depressive disorder  F33.1 296.32   2. Generalized anxiety disorder  F41.1 300.02        History of Present Illness:   Melani Bhatt is a 29 y.o. female who is here today for follow up and medication management    Subjective      Patient Reports:   History of Present Illness  The patient presents for evaluation of anxiety.    She has been taking Prozac 20 mg in the morning, with a half dose of Zoloft, as per the previous recommendation to skip days. However, she reports no significant improvement since her last visit. Despite the increase in Prozac dosage, she has not noticed any significant changes. She is uncertain about the effectiveness of Prozac. Since the recent panic attack, she has been continuing her Zoloft intake. She has not used any as needed medication specifically for anxiety.     She experienced a panic attack last week, which was triggered by external stress factors, particularly legal issues related to her previous employment. This incident led to an emotional outburst, including crying and pacing for about an hour, and subsequent sleep disturbance. She has had fewer panic attacks overall, but the most recent one was pretty bad and she still requires considerable time to recover from them.     Her sleep has improved, averaging 8 hours per night, although she feels tired during the day and takes naps on weekends. She has experienced a decrease in appetite over the past month. She reports no suicidal or homicidal thoughts but admits to feeling frustrated and having unusual urges to hit something.    She has reduced her alcohol consumption during weekdays, limiting it to a few drinks on weekends, and reports no withdrawal symptoms. She has not undergone [employer] testing, but  is interested in this.    Her Keppra levels were found to be suboptimal despite an increased dosage, but she has not had any seizures.    Review of Systems:   Review of Systems   Constitutional:  Negative for appetite change and unexpected weight change.   Eyes:  Negative for visual disturbance.   Respiratory:  Negative for chest tightness and shortness of breath.    Cardiovascular:  Negative for chest pain.   Musculoskeletal:  Negative for gait problem.   Skin:  Negative for rash and wound.   Neurological:  Positive for seizures. Negative for dizziness, tremors, weakness and light-headedness.   Psychiatric/Behavioral:  Positive for dysphoric mood and sleep disturbance. Negative for agitation, behavioral problems, confusion, decreased concentration, hallucinations, self-injury and suicidal ideas. The patient is nervous/anxious. The patient is not hyperactive.      Sleep pattern: 8 hours, tired during the day  Appetite: this last week decreased appetite, after coming off of panic attack     PHQ-9 Depression Screening  Little interest or pleasure in doing things? Several days   Feeling down, depressed, or hopeless? Several days   PHQ-2 Total Score 2   Trouble falling or staying asleep, or sleeping too much? Several days   Feeling tired or having little energy? Several days   Poor appetite or overeating? Several days   Feeling bad about yourself - or that you are a failure or have let yourself or your family down? Several days   Trouble concentrating on things, such as reading the newspaper or watching television? Several days   Moving or speaking so slowly that other people could have noticed? Or the opposite - being so fidgety or restless that you have been moving around a lot more than usual? Not at all   Thoughts that you would be better off dead, or of hurting yourself in some way? Not at all   PHQ-9 Total Score 7   If you checked off any problems, how difficult have these problems made it for you to do your work,  take care of things at home, or get along with other people? Very difficult       LORE-7 Anxiety Screening  Over the last two weeks, how often have you been bothered by the following problems?  Feeling nervous, anxious or on edge: Several days  Not being able to stop or control worrying: Several days  Worrying too much about different things: Several days  Trouble Relaxing: Several days  Being so restless that it is hard to sit still: Several days  Becoming easily annoyed or irritable: More than half the days  Feeling afraid as if something awful might happen: More than half the days  LORE 7 Total Score: 9  If you checked any problems, how difficult have these problems made it for you to do your work, take care of things at home, or get along with other people: Very difficult    RISK ASSESSMENT:  Patient denies any thoughts or intent of suicide today. Patient denies any impulsive behavior today.     Medications:     Current Outpatient Medications:     fexofenadine (ALLEGRA) 60 MG tablet, Take 1 tablet by mouth Daily., Disp: , Rfl:     levETIRAcetam (KEPPRA) 1000 MG tablet, Take 1 tablet by mouth 2 (Two) Times a Day., Disp: 60 tablet, Rfl: 6    Midazolam (Nayzilam) 5 MG/0.1ML solution, Administer 0.1 mL into the nostril(s) as directed by provider As Needed (At the onset of her seizure.  May repeat once in 10 minutes if needed.)., Disp: 2 each, Rfl: 5    multivitamin (MULTI-VITAMIN DAILY PO), Take  by mouth Daily., Disp: , Rfl:     norgestimate-ethinyl estradiol (ORTHO-CYCLEN) 0.25-35 MG-MCG per tablet, Take 1 tablet by mouth Daily. Take in a continuous fashion, skipping placebos, Disp: 112 tablet, Rfl: 4    FLUoxetine (PROzac) 40 MG capsule, Take 1 capsule by mouth Daily., Disp: 30 capsule, Rfl: 0    Medication Considerations:  RAZA reviewed and appropriate.      Allergies:   No Known Allergies         Objective     Physical Exam:  Vital Signs:   Vitals:    10/21/24 1151 10/21/24 1153   BP:  118/74   Pulse:  86  "  SpO2:  99%   Weight: 74.8 kg (165 lb)    Height: 177.8 cm (70\")      Body mass index is 23.68 kg/m².     Mental Status Exam:   Hygiene:   good   Cooperation:  Cooperative  Eye Contact:  Good  Psychomotor Behavior:  Appropriate  Affect:   tearful  Mood: normal  Speech:  Normal  Thought Process:  Goal directed and Linear  Thought Content:  Normal  Suicidal:  None  Homicidal:  None  Hallucinations:  None  Delusion:  None  Memory:  Intact  Orientation:  Person, Place, Time, and Situation  Reliability:  good  Insight:  Fair  Judgement:  Fair  Impulse Control:  Fair  Physical/Medical Issues:   see problem list      Assessment / Plan      Visit Diagnosis/Orders Placed This Visit:  Diagnoses and all orders for this visit:    1. Moderate episode of recurrent major depressive disorder (Primary)  -     FLUoxetine (PROzac) 40 MG capsule; Take 1 capsule by mouth Daily.  Dispense: 30 capsule; Refill: 0    2. Generalized anxiety disorder  -     FLUoxetine (PROzac) 40 MG capsule; Take 1 capsule by mouth Daily.  Dispense: 30 capsule; Refill: 0         Functional Status: Mild impairment     Prognosis: Good with Ongoing Treatment     Impression/Formulation:  Patient appeared alert and oriented.  Patient is voluntarily requesting to continue outpatient psychiatric treatment at [facility].  Patient is receptive to assistance with maintaining a stable lifestyle.  Patient presents with history of     ICD-10-CM ICD-9-CM   1. Moderate episode of recurrent major depressive disorder  F33.1 296.32   2. Generalized anxiety disorder  F41.1 300.02   .    Reviewed patient's previous provider notes. Reviewed most recent labs. Patient meets DSM V diagnostic criteria for diagnoses. Diagnoses may be updated as more information becomes available.     Assessment & Plan  1. Anxiety.  The patient reports no significant improvement in symptoms since the last visit, despite increasing the Prozac dosage and skipping days on Zoloft. " She experienced a panic attack last week, primarily due to external stress factors. The potential benefits of [employer] testing were discussed, and she was advised to contact her insurance provider regarding the cost. Increasing the Prozac dosage to 40 mg was recommended, and a prescription for Prozac 40 mg for 30 days will be provided. Hydroxyzine was considered as an as-needed option for anxiety, but the patient declined. The potential side effects of antidepressants, such as irritability, agitation, and anger, were explained. She was advised to monitor for any severe side effects or suicidal ideation with the increased Prozac dosage and to revert to the 20 mg dosage and inform the office immediately if such symptoms occur.    2. Depression.  The patient reports feeling the same with no significant improvement. Increasing the Prozac dosage to 40 mg was recommended to achieve a therapeutic level. She was advised to monitor for any severe side effects or suicidal ideation and to revert to the 20 mg dosage and inform the office immediately if such symptoms occur. The potential side effects of antidepressants, such as irritability, agitation, and anger, were explained.    3. Medication Management.  The patient is currently taking Keppra for seizures, and her levels were not at the desired range despite dosage increases. The potential use of lamotrigine, a medication prescribed for seizures, was discussed for mood stabilization depending on her results from the increase in prozac.     Follow-up  Return in 1 month for follow up or sooner if needed.    Treatment Plan:   Continue to wean off of zoloft  Increase fluoxetine to 40 mg daily    Patient will continue supportive psychotherapy efforts and medications as indicated. Clinic will obtain release of information for current treatment team for continuity of care as needed. Patient will contact this office, call 911 or present to the nearest emergency room should suicidal  or homicidal ideations occur.  Discussed medication options and treatment plan of prescribed medication(s) as well as the risks, benefits, and potential side effects. Patient acknowledged and verbally consented to continue with current treatment plan and was educated on the importance of compliance with treatment and follow-up appointments.     Patient instructions:  Instructed will take 4-6 weeks for full therapeutic effect. Medication risks and side effects discussed with patient including risk for worsening mood, changes in behavior, thoughts of suicide or homicide, induction of parul, serotonin syndrome.   If any thoughts of SI or HI, worsening mood or changes in behavior, call 911 or crisis line [phone], or go to nearest ER at once. Pt.verbalizes understanding and consents to treatment with this medication.     Follow Up:   Return in about 1 month (around 11/21/2024) for Med Check.    Patient or patient representative verbalized consent for the use of Ambient Listening during the visit with  QUINN Hooker for chart documentation. 10/21/2024  12:57 EDT    QUINN Hooker, [facility]

## 2024-11-18 ENCOUNTER — TELEPHONE (OUTPATIENT)
Dept: INTERNAL MEDICINE | Facility: CLINIC | Age: 29
End: 2024-11-18
Payer: MEDICAID

## 2024-11-18 DIAGNOSIS — F33.1 MODERATE EPISODE OF RECURRENT MAJOR DEPRESSIVE DISORDER: ICD-10-CM

## 2024-11-18 DIAGNOSIS — F41.1 GENERALIZED ANXIETY DISORDER: ICD-10-CM

## 2024-11-18 RX ORDER — FLUOXETINE 40 MG/1
40 CAPSULE ORAL DAILY
Qty: 30 CAPSULE | Refills: 0 | OUTPATIENT
Start: 2024-11-18

## 2024-11-23 DIAGNOSIS — F33.1 MODERATE EPISODE OF RECURRENT MAJOR DEPRESSIVE DISORDER: ICD-10-CM

## 2024-11-23 DIAGNOSIS — F41.1 GENERALIZED ANXIETY DISORDER: ICD-10-CM

## 2024-11-23 NOTE — TELEPHONE ENCOUNTER
Last appointment: 10/21/2024  Next appointment: 12/2/2024      Last Refill: 10/21/2024 quantity of 30 with 0 refills. Patient will not have enough to last until his next appointment. Okay to fill short supply?

## 2024-11-25 RX ORDER — FLUOXETINE 40 MG/1
40 CAPSULE ORAL DAILY
Qty: 30 CAPSULE | Refills: 0 | Status: SHIPPED | OUTPATIENT
Start: 2024-11-25 | End: 2024-12-02 | Stop reason: SDUPTHER

## 2024-12-02 ENCOUNTER — OFFICE VISIT (OUTPATIENT)
Dept: BEHAVIORAL HEALTH | Facility: CLINIC | Age: 29
End: 2024-12-02
Payer: MEDICAID

## 2024-12-02 VITALS
HEART RATE: 79 BPM | OXYGEN SATURATION: 99 % | SYSTOLIC BLOOD PRESSURE: 108 MMHG | DIASTOLIC BLOOD PRESSURE: 72 MMHG | HEIGHT: 70 IN | WEIGHT: 165 LBS | BODY MASS INDEX: 23.62 KG/M2

## 2024-12-02 DIAGNOSIS — F33.1 MODERATE EPISODE OF RECURRENT MAJOR DEPRESSIVE DISORDER: ICD-10-CM

## 2024-12-02 DIAGNOSIS — F41.1 GENERALIZED ANXIETY DISORDER: ICD-10-CM

## 2024-12-02 PROCEDURE — 1159F MED LIST DOCD IN RCRD: CPT | Performed by: REGISTERED NURSE

## 2024-12-02 PROCEDURE — 99214 OFFICE O/P EST MOD 30 MIN: CPT | Performed by: REGISTERED NURSE

## 2024-12-02 PROCEDURE — 1160F RVW MEDS BY RX/DR IN RCRD: CPT | Performed by: REGISTERED NURSE

## 2024-12-02 PROCEDURE — 96127 BRIEF EMOTIONAL/BEHAV ASSMT: CPT | Performed by: REGISTERED NURSE

## 2024-12-02 RX ORDER — FLUOXETINE 40 MG/1
40 CAPSULE ORAL DAILY
Qty: 30 CAPSULE | Refills: 2 | Status: SHIPPED | OUTPATIENT
Start: 2024-12-02

## 2024-12-02 NOTE — PROGRESS NOTES
Follow Up Office Visit      Patient Name: Melani Bhatt  : 1995   MRN: [id number]     Referring Provider: Caremn Lozano PA-C    Chief Complaint:      ICD-10-CM ICD-9-CM   1. Moderate episode of recurrent major depressive disorder  F33.1 296.32   2. Generalized anxiety disorder  F41.1 300.02        History of Present Illness:   Melani Bhatt is a 29 y.o. female who is here today for follow up and medication management    Subjective      Patient Reports:   History of Present Illness  The patient presents for evaluation of depression and anxiety.    She has discontinued Zoloft prior to her last visit and is currently on Prozac 40 mg. She reports feeling slightly better than when she was on Zoloft, attributing this improvement to engaging in other activities. She does not endorse experiencing suicidal ideation, thoughts of harming others, or hallucinations.    Her sleep pattern has been irregular due to the holiday season and her work season ending and fewer commitments. She gets 8 to 9 hours of sleep, including some napping. She has noticed a decrease in her appetite since starting the medication, consuming only half of what she used to eat, but without any weight loss. She is not interested in increasing her medication dosage at this time. She is looking forward to her upcoming time off work to establish a more consistent daily routine.    Review of Systems:   Review of Systems   Constitutional:  Negative for appetite change and unexpected weight change.   Eyes:  Negative for visual disturbance.   Respiratory:  Negative for chest tightness and shortness of breath.    Cardiovascular:  Negative for chest pain.   Musculoskeletal:  Negative for gait problem.   Skin:  Negative for rash and wound.   Neurological:  Positive for seizures. Negative for dizziness, tremors, weakness and light-headedness.   Psychiatric/Behavioral:  Positive for dysphoric mood and sleep disturbance. Negative for agitation,  behavioral problems, confusion, decreased concentration, hallucinations, self-injury and suicidal ideas. The patient is nervous/anxious. The patient is not hyperactive.      Sleep pattern: 8-9 hours, have napped some  Appetite: decreased appetite     PHQ-9 Depression Screening  Little interest or pleasure in doing things? Several days   Feeling down, depressed, or hopeless? Several days   PHQ-2 Total Score 2   Trouble falling or staying asleep, or sleeping too much? Several days   Feeling tired or having little energy? Several days   Poor appetite or overeating? Several days   Feeling bad about yourself - or that you are a failure or have let yourself or your family down? Not at all   Trouble concentrating on things, such as reading the newspaper or watching television? Not at all   Moving or speaking so slowly that other people could have noticed? Or the opposite - being so fidgety or restless that you have been moving around a lot more than usual? Not at all   Thoughts that you would be better off dead, or of hurting yourself in some way? Not at all   PHQ-9 Total Score 5   If you checked off any problems, how difficult have these problems made it for you to do your work, take care of things at home, or get along with other people? Somewhat difficult       LORE-7 Anxiety Screening  Over the last two weeks, how often have you been bothered by the following problems?  Feeling nervous, anxious or on edge: Not at all  Not being able to stop or control worrying: Not at all  Worrying too much about different things: Not at all  Trouble Relaxing: Several days  Being so restless that it is hard to sit still: Not at all  Becoming easily annoyed or irritable: Several days  Feeling afraid as if something awful might happen: Not at all  LORE 7 Total Score: 2  If you checked any problems, how difficult have these problems made it for you to do your work, take care of things at home, or get along with other people: Somewhat  "difficult    RISK ASSESSMENT:  Patient denies any thoughts or intent of suicide today. Patient denies any impulsive behavior today.     Medications:     Current Outpatient Medications:     fexofenadine (ALLEGRA) 60 MG tablet, Take 1 tablet by mouth Daily., Disp: , Rfl:     FLUoxetine (PROzac) 40 MG capsule, Take 1 capsule by mouth Daily., Disp: 30 capsule, Rfl: 2    levETIRAcetam (KEPPRA) 1000 MG tablet, Take 1 tablet by mouth 2 (Two) Times a Day., Disp: 60 tablet, Rfl: 6    Midazolam (Nayzilam) 5 MG/0.1ML solution, Administer 0.1 mL into the nostril(s) as directed by provider As Needed (At the onset of her seizure.  May repeat once in 10 minutes if needed.)., Disp: 2 each, Rfl: 5    multivitamin (MULTI-VITAMIN DAILY PO), Take  by mouth Daily., Disp: , Rfl:     norgestimate-ethinyl estradiol (ORTHO-CYCLEN) 0.25-35 MG-MCG per tablet, Take 1 tablet by mouth Daily. Take in a continuous fashion, skipping placebos, Disp: 112 tablet, Rfl: 4    Medication Considerations:  RAZA reviewed and appropriate.      Allergies:   No Known Allergies       Objective     Physical Exam:  Vital Signs:   Vitals:    12/02/24 1216   BP: 108/72   Pulse: 79   SpO2: 99%   Weight: 74.8 kg (165 lb)   Height: 177.8 cm (70\")     Body mass index is 23.68 kg/m².     Mental Status Exam:   Hygiene:   good   Cooperation:  Cooperative  Eye Contact:  Good  Psychomotor Behavior:  Appropriate  Affect:  Appropriate  Mood: normal  Speech:  Normal  Thought Process:  Goal directed and Linear  Thought Content:  Normal  Suicidal:  None  Homicidal:  None  Hallucinations:  None  Delusion:  None  Memory:  Intact  Orientation:  Person, Place, Time, and Situation  Reliability:  good  Insight:  Good  Judgement:  Good  Impulse Control:  Good  Physical/Medical Issues:   see problem list      Assessment / Plan      Visit Diagnosis/Orders Placed This Visit:  Diagnoses and all orders for this visit:    1. Moderate episode of recurrent major depressive disorder  -     " FLUoxetine (PROzac) 40 MG capsule; Take 1 capsule by mouth Daily.  Dispense: 30 capsule; Refill: 2    2. Generalized anxiety disorder  -     FLUoxetine (PROzac) 40 MG capsule; Take 1 capsule by mouth Daily.  Dispense: 30 capsule; Refill: 2             Functional Status: No impairment    Prognosis: Good with Ongoing Treatment     Impression/Formulation:  Patient appeared alert and oriented.  Patient is voluntarily requesting to continue outpatient psychiatric treatment at [facility].  Patient is receptive to assistance with maintaining a stable lifestyle.  Patient presents with history of     ICD-10-CM ICD-9-CM   1. Moderate episode of recurrent major depressive disorder  F33.1 296.32   2. Generalized anxiety disorder  F41.1 300.02   .    Reviewed patient's previous provider notes. Reviewed most recent labs. Patient meets DSM V diagnostic criteria for diagnoses. Diagnoses may be updated as more information becomes available.     Assessment & Plan  1. Depression.  Her depression screenings have shown improvement from her initial appointment. She reports feeling slightly better than when she was on Zoloft. Currently, she is on Prozac 40 mg. She prefers to stay the course with the current dose rather than increasing it. A refill of Prozac at the current dose will be provided. She is advised to contact the office if her condition deteriorates or if she experiences any severe symptoms such as suicidal thoughts.    2. Anxiety.  Her anxiety screenings have also shown improvement. She reports no suicidal thoughts, thoughts of hurting others, or hallucinations. She is getting 8-9 hours of sleep. She has experienced a decreased appetite but has not lost any weight.    Follow-up  Return in 2 months for follow up or sooner if needed.    Treatment Plan:   Continue prozac 40 mg daily      Patient will continue supportive psychotherapy efforts and medications as indicated. Clinic will obtain release of  information for current treatment team for continuity of care as needed. Patient will contact this office, call 911 or present to the nearest emergency room should suicidal or homicidal ideations occur.  Discussed medication options and treatment plan of prescribed medication(s) as well as the risks, benefits, and potential side effects. Patient acknowledged and verbally consented to continue with current treatment plan and was educated on the importance of compliance with treatment and follow-up appointments.     Patient instructions:  Medication risks and side effects discussed with patient including risk for worsening mood, changes in behavior, thoughts of suicide or homicide, induction of parul, serotonin syndrome.   If any thoughts of SI or HI, worsening mood or changes in behavior, call 911 or crisis line [phone], or go to nearest ER at once. Pt.verbalizes understanding and consents to treatment with this medication.     Follow Up:   Return in about 2 months (around 2/2/2025) for Med Check.    Patient or patient representative verbalized consent for the use of Ambient Listening during the visit with  QUINN Hooker for chart documentation. 12/2/2024  13:24 EST    QUINN Hooker, [facility]

## 2025-01-07 ENCOUNTER — OFFICE VISIT (OUTPATIENT)
Dept: NEUROLOGY | Facility: CLINIC | Age: 30
End: 2025-01-07
Payer: MEDICAID

## 2025-01-07 VITALS
BODY MASS INDEX: 24.05 KG/M2 | SYSTOLIC BLOOD PRESSURE: 114 MMHG | DIASTOLIC BLOOD PRESSURE: 70 MMHG | WEIGHT: 168 LBS | OXYGEN SATURATION: 98 % | HEIGHT: 70 IN | HEART RATE: 92 BPM

## 2025-01-07 DIAGNOSIS — G40.109 PARTIAL SYMPTOMATIC EPILEPSY WITH SIMPLE PARTIAL SEIZURES, NOT INTRACTABLE, WITHOUT STATUS EPILEPTICUS: Primary | ICD-10-CM

## 2025-01-07 PROCEDURE — 1159F MED LIST DOCD IN RCRD: CPT | Performed by: PSYCHIATRY & NEUROLOGY

## 2025-01-07 PROCEDURE — 1160F RVW MEDS BY RX/DR IN RCRD: CPT | Performed by: PSYCHIATRY & NEUROLOGY

## 2025-01-07 PROCEDURE — 99213 OFFICE O/P EST LOW 20 MIN: CPT | Performed by: PSYCHIATRY & NEUROLOGY

## 2025-01-07 RX ORDER — LEVETIRACETAM 1000 MG/1
1000 TABLET ORAL 2 TIMES DAILY
Qty: 60 TABLET | Refills: 6 | Status: SHIPPED | OUTPATIENT
Start: 2025-01-07

## 2025-01-07 NOTE — PROGRESS NOTES
Subjective:    CC: Melani Bhatt is seen todayl for Partial symptomatic epilepsy with simple partial seizures,        Current visit-patient denies having any seizures since she last saw me.  Her last GTC was on 6/10 she did have a minor aura lasting for 1 to 2 seconds recently when she was dehydrated but otherwise denies having any episodes with loss of awareness/déjà vu with her last episode being in September.  In October she did increase her dose of Keppra to 1000 mg twice daily.  Her last Keppra level prior to the change was 25.9 (normal 10-40).  Her Nayzilam was also approved but she has not had to use it.  She has cut back on her binge alcohol drinking over the weekend but did drink some over the holidays.    Last visit-patient denies having any generalized tonic-clonic seizures since she last saw me with her last episode being on 6/10.  She has however continued to have her auras where she gets a sense of déjà vu with a feeling of facial flushing/nausea.  Along with these auras she has also had occasional word finding difficulties specially at work.  She had her 2-hour sleep deprived video EEG that was normal.  On 9/13 I had increased her Keppra to 750 mg twice daily which she is tolerating well however she did have 1 aura on 9/26 and 2 on 9/27 without any loss of awareness or confusion.  She sleeps well at night and has also cut back on her alcohol intake.    Initial yrftp-47-cgov-old female accompanied by her mother with a past medical history of depression presents with new onset seizures.  As per patient she was cooking a meal in the kitchen on the morning of 6/10 when she went down to sit.  Soon afterwards her mother noticed that she was slumped over in the chair trying to reach out with her right hand.  When she tried to straighten her up the patient stiffened up and started to have whole body shaking.  The episode lasted for a few minutes.  She denies any tongue bite or bowel/bladder incontinence.  By  the time EMS arrived patient was getting back to her normal self answering questions although she has no recollection of this.  In the ER while talking to the physician she had another generalized tonic-clonic seizure.  Was extremely agitated and combative after that for about 30 minutes and had to be given IV sedation including Versed several times.  She had a CT head and MRI brain both of which did not show any significant chronic ischemic changes, mesial temporal sclerosis or acute intracranial abnormalities.  Also had an EEG that showed mild generalized slowing as well as excessive beta activity but no epileptiform activity.  In addition she had a lumbar puncture that was unremarkable.  Her white count was slightly elevated at 12.1 and calcium was 8.1.  U tox was positive for THC and benzos.  Alcohol was negative even though patient states that she typically has 3-4 alcoholic drinks twice a week on her days off (Sunday and Monday).  Does not remember if she had any alcohol the night before or not. Also denies being sick or sleep deprived.  She was started on Keppra 500 mg twice daily that she has continued to take without any adverse effects.  A few months prior to these episodes she started to have brief episodes of anxiety where she has facial flushing, feels nauseous with a sense of déjà vu lasting for about 1 to 2 minutes.  These were occurring about once a month but since her seizures in June have now been occurring about once a week.  Past history-mom denies any abnormal birth history (she was born 3 weeks prematurely but was sent home immediately), family history of seizures, concussions or febrile seizures as a child  Of note-I personally reviewed her MRI brain and the hospital notes    The following portions of the patient's history were reviewed today and updated as of 07/25/2024  : allergies, current medications, past family history, past medical history, past social history, past surgical history,  and problem list  These document will be scanned to patient's chart.      Current Outpatient Medications:     fexofenadine (ALLEGRA) 60 MG tablet, Take 1 tablet by mouth Daily., Disp: , Rfl:     FLUoxetine (PROzac) 40 MG capsule, Take 1 capsule by mouth Daily., Disp: 30 capsule, Rfl: 2    levETIRAcetam (KEPPRA) 1000 MG tablet, Take 1 tablet by mouth 2 (Two) Times a Day., Disp: 60 tablet, Rfl: 6    Midazolam (Nayzilam) 5 MG/0.1ML solution, Administer 0.1 mL into the nostril(s) as directed by provider As Needed (At the onset of her seizure.  May repeat once in 10 minutes if needed.)., Disp: 2 each, Rfl: 5    norgestimate-ethinyl estradiol (ORTHO-CYCLEN) 0.25-35 MG-MCG per tablet, Take 1 tablet by mouth Daily. Take in a continuous fashion, skipping placebos, Disp: 112 tablet, Rfl: 4   Past Medical History:   Diagnosis Date    Acne     Allergic     Depression     Genital herpes     Heart murmur     as a child    Herpes genitalis     Irregular menses     Ovarian cyst     Pap smear for cervical cancer screening 08/2016    Seizures     Vegan diet       Past Surgical History:   Procedure Laterality Date    WISDOM TOOTH EXTRACTION      x4      Family History   Problem Relation Age of Onset    Breast cancer Mother     Arthritis Father     Hypertension Father     Arthritis Maternal Grandmother     Breast cancer Maternal Grandmother     Hypertension Maternal Grandmother     Hypertension Maternal Grandfather     Arthritis Paternal Grandmother     Ovarian cancer Paternal Grandmother     Breast cancer Paternal Grandmother     Heart attack Paternal Grandmother     Hypertension Paternal Grandmother     Dementia Paternal Grandmother     Hypertension Paternal Grandfather     Stroke Paternal Grandfather     Kidney cancer Other       Social History     Socioeconomic History    Marital status: Single   Tobacco Use    Smoking status: Never     Passive exposure: Never    Smokeless tobacco: Never   Vaping Use    Vaping status: Former     "Start date: 1/1/2020    Quit date: 1/1/2023    Substances: Delta 8   Substance and Sexual Activity    Alcohol use: Yes     Alcohol/week: 12.0 standard drinks of alcohol     Types: 10 Cans of beer, 2 Drinks containing 0.5 oz of alcohol per week    Drug use: No    Sexual activity: Not Currently     Partners: Female, Male     Birth control/protection: Condom, Birth control pill, OCP     Review of Systems   Neurological:  Positive for seizures.   All other systems reviewed and are negative.      Objective:    /70   Pulse 92   Ht 177.8 cm (70\")   Wt 76.2 kg (168 lb)   SpO2 98%   BMI 24.11 kg/m²     Neurology Exam:    General apperance: NAD.     Mental status: Alert, awake and oriented to time place and person.    Recent and Remote memory: Intact.    Attention span and Concentration: Normal.     Language and Speech: Intact- No dysarthria.    Fluency, Naming , Repitition and Comprehension:  Intact    Cranial Nerves:   CN II: Visual fields are full. Intact. Fundi - Normal, No papillederma, Pupils - AUDI  CN III, IV and VI: Extraocular movements are intact. Normal saccades.   CN V: Facial sensation is intact.   CN VII: Muscles of facial expression reveal no asymmetry. Intact.   CN VIII: Hearing is intact. Whispered voice intact.   CN IX and X: Palate elevates symmetrically. Intact  CN XI: Shoulder shrug is intact.   CN XII: Tongue is midline without evidence of atrophy or fasciculation.     Ophthalmoscopic exam of optic disc-normal    Motor:  Right UE muscle strength 5/5. Normal tone.     Left UE muscle strength 5/5. Normal tone.      Right LE muscle strength5/5. Normal tone.     Left LE muscle strength 5/5. Normal tone.      Sensory: Normal light touch, vibration and pinprick sensation bilaterally.    DTRs: 2+ bilaterally in upper and lower extremities.    Babinski: Negative bilaterally.    Co-ordination: Normal finger-to-nose, heel to shin B/L.    Rhomberg: Negative.    Gait: Normal.    Cardiovascular: Regular " rate and rhythm without murmur, gallop or rub.    Assessment and Plan:  1.  Focal epilepsy  Based on her episode semiology she most likely has focal seizures.  Previously MRI brain and routine EEG were unremarkable.  2 hours sleep deprived video EEG was also normal (she did not sleep at all during the test)  She should continue Keppra 1000 mg twice daily and Nayzilam 5 mg as needed at the onset of her aura  Counseling given on seizure precautions, medication compliance and avoiding binge alcohol drinking.  She should also keep herself well-hydrated         Return in about 5 months (around 6/7/2025).       Eileen Hurst MD

## 2025-01-21 ENCOUNTER — TELEPHONE (OUTPATIENT)
Dept: NEUROLOGY | Facility: CLINIC | Age: 30
End: 2025-01-21
Payer: MEDICAID

## 2025-01-21 NOTE — TELEPHONE ENCOUNTER
Caller: Melani Bhatt    Relationship: Self    Best call back number: [phone]     What was the call regarding: THE PT JUST RECEIVED A HARDCOPY OF PPW FROM THE [employer] AND THEY INFORMED HER THE PPW WOULD NEED TO BE DONE EVERY 3 MONTHS. SHE WANTS TO SPEAK TO SOMEONE REGARDING THIS TIME TABLE AND SET UP A SYSTEM FOR THIS.     PLEASE ADVISE  THANK YOU     Is it okay if the provider responds through [employer]: YES

## 2025-01-22 NOTE — TELEPHONE ENCOUNTER
PATIENT CAME IN WITH [facility] MEDICAL REVIEW EXAM FORMS TO BE COMPLETED BY DR NAIR. WAS FRUSTRATED BECAUSE SHE NEEDS TO BE SEEN EVERY 3 MONTHS AND THAT THERE IS A FORM FEE OF $15.00. TRIED TO PAY BUT SYSTEM WAS FROZEN. SHE WILL PAY WHEN WE FAX THE FORMS IN.     PAPERWORK PLACED IN DR. NAIR'S BASKET UP FRONT.

## 2025-02-03 ENCOUNTER — OFFICE VISIT (OUTPATIENT)
Dept: BEHAVIORAL HEALTH | Facility: CLINIC | Age: 30
End: 2025-02-03
Payer: MEDICAID

## 2025-02-03 VITALS
OXYGEN SATURATION: 99 % | WEIGHT: 163 LBS | BODY MASS INDEX: 23.34 KG/M2 | SYSTOLIC BLOOD PRESSURE: 108 MMHG | DIASTOLIC BLOOD PRESSURE: 64 MMHG | HEIGHT: 70 IN | HEART RATE: 84 BPM

## 2025-02-03 DIAGNOSIS — F33.1 MODERATE EPISODE OF RECURRENT MAJOR DEPRESSIVE DISORDER: Primary | ICD-10-CM

## 2025-02-03 DIAGNOSIS — F41.1 GENERALIZED ANXIETY DISORDER: ICD-10-CM

## 2025-02-03 DIAGNOSIS — G47.9 SLEEP DISORDER: ICD-10-CM

## 2025-02-03 PROCEDURE — 96127 BRIEF EMOTIONAL/BEHAV ASSMT: CPT | Performed by: REGISTERED NURSE

## 2025-02-03 PROCEDURE — 1159F MED LIST DOCD IN RCRD: CPT | Performed by: REGISTERED NURSE

## 2025-02-03 PROCEDURE — 99214 OFFICE O/P EST MOD 30 MIN: CPT | Performed by: REGISTERED NURSE

## 2025-02-03 PROCEDURE — 1160F RVW MEDS BY RX/DR IN RCRD: CPT | Performed by: REGISTERED NURSE

## 2025-02-03 RX ORDER — FLUOXETINE 40 MG/1
40 CAPSULE ORAL DAILY
Qty: 90 CAPSULE | Refills: 0 | Status: SHIPPED | OUTPATIENT
Start: 2025-02-03

## 2025-02-03 NOTE — PROGRESS NOTES
Follow Up Office Visit      Patient Name: Melani Bhatt  : 1995   MRN: [id number]     Referring Provider: Carmen Lozano PA-C    Chief Complaint:      ICD-10-CM ICD-9-CM   1. Moderate episode of recurrent major depressive disorder  F33.1 296.32   2. Generalized anxiety disorder  F41.1 300.02   3. Sleep disorder  G47.9 780.50        History of Present Illness:   Melani Bhatt is a 29 y.o. female who is here today for follow up and medication management    Subjective      Patient Reports:   History of Present Illness  The patient presents via virtual visit for evaluation of anxiety and depression.    She reports a stable condition with no significant changes in her symptoms. 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 Her sleep pattern is irregular, often waking up between 2:00 AM and 3:00 AM and struggling to return to sleep until 6:00 AM or 7:00 AM. Despite this, she feels rested upon waking. During these wakeful periods, she engages in activities such as listening to music or watching television. She estimates her total sleep duration to be between 6 to 8 hours. Her dietary habits have been inconsistent, often skipping lunch and consuming either two meals or one substantial meal with a few snacks. This is not a deliberate choice but rather a result of diminished appetite, particularly in the morning. She typically consumes a late breakfast and attempts to sustain herself until dinner. She reports no suicidal ideation, homicidal thoughts, or hallucinations. She experiences vivid dreams, which she describes as realistic, and occasionally wakes up due to anxiety dreams, occurring approximately once a week. She is uncertain if her medication affects her sleep and has not tried melatonin. She is currently on a regimen of Prozac 40 mg, which she takes at night, and expresses a desire to maintain  this dosage until after she starts back to work and sees how things go.      Review of Systems:   Review of Systems   Constitutional:  Negative for appetite change and unexpected weight change.   Eyes:  Negative for visual disturbance.   Respiratory:  Negative for chest tightness and shortness of breath.    Cardiovascular:  Negative for chest pain.   Musculoskeletal:  Negative for gait problem.   Skin:  Negative for rash and wound.   Neurological:  Negative for dizziness, tremors, weakness and light-headedness.   Psychiatric/Behavioral:  Positive for sleep disturbance. Negative for agitation, behavioral problems, confusion, decreased concentration, hallucinations, self-injury and suicidal ideas. The patient is nervous/anxious. The patient is not hyperactive.      Sleep pattern: up at 2-3 in the morning and can't go back to sleep, gets around 6-8 hours  Appetite: not eating regular meals, two meals      PHQ-9 Depression Screening  Little interest or pleasure in doing things? Several days   Feeling down, depressed, or hopeless? Several days   PHQ-2 Total Score 2   Trouble falling or staying asleep, or sleeping too much? Several days   Feeling tired or having little energy? Several days   Poor appetite or overeating? Several days   Feeling bad about yourself - or that you are a failure or have let yourself or your family down? Several days   Trouble concentrating on things, such as reading the newspaper or watching television? Several days   Moving or speaking so slowly that other people could have noticed? Or the opposite - being so fidgety or restless that you have been moving around a lot more than usual? Several days   Thoughts that you would be better off dead, or of hurting yourself in some way? Not at all   PHQ-9 Total Score 8   If you checked off any problems, how difficult have these problems made it for you to do your work, take care of things at home, or get along with other people? Somewhat difficult  "      LORE-7 Anxiety Screening  Over the last two weeks, how often have you been bothered by the following problems?  Feeling nervous, anxious or on edge: Several days  Not being able to stop or control worrying: Not at all  Worrying too much about different things: Not at all  Trouble Relaxing: Not at all  Being so restless that it is hard to sit still: Not at all  Becoming easily annoyed or irritable: More than half the days  Feeling afraid as if something awful might happen: Not at all  LORE 7 Total Score: 3  If you checked any problems, how difficult have these problems made it for you to do your work, take care of things at home, or get along with other people: Somewhat difficult    RISK ASSESSMENT:  Patient denies any thoughts or intent of suicide today. Patient denies any impulsive behavior today.     Medications:     Current Outpatient Medications:     fexofenadine (ALLEGRA) 60 MG tablet, Take 1 tablet by mouth Daily., Disp: , Rfl:     FLUoxetine (PROzac) 40 MG capsule, Take 1 capsule by mouth Daily., Disp: 90 capsule, Rfl: 0    levETIRAcetam (KEPPRA) 1000 MG tablet, Take 1 tablet by mouth 2 (Two) Times a Day., Disp: 60 tablet, Rfl: 6    Midazolam (Nayzilam) 5 MG/0.1ML solution, Administer 0.1 mL into the nostril(s) as directed by provider As Needed (At the onset of her seizure.  May repeat once in 10 minutes if needed.)., Disp: 2 each, Rfl: 5    norgestimate-ethinyl estradiol (ORTHO-CYCLEN) 0.25-35 MG-MCG per tablet, Take 1 tablet by mouth Daily. Take in a continuous fashion, skipping placebos, Disp: 112 tablet, Rfl: 4    Medication Considerations:  RAZA reviewed and appropriate.      Allergies:   No Known Allergies        Objective     Physical Exam:  Vital Signs:   Vitals:    02/03/25 1014   BP: 108/64   Pulse: 84   SpO2: 99%   Weight: 73.9 kg (163 lb)   Height: 177.8 cm (70\")     Body mass index is 23.39 kg/m².     Mental Status Exam:   Hygiene:   good   Cooperation:  Cooperative  Eye Contact:  " Good  Psychomotor Behavior:  Appropriate  Affect:  Appropriate  Mood: normal  Speech:  Normal  Thought Process:  Goal directed and Linear  Thought Content:  Normal  Suicidal:  None  Homicidal:  None  Hallucinations:  None  Delusion:  None  Memory:  Intact  Orientation:  Person, Place, Time, and Situation  Reliability:  good  Insight:  Good  Judgement:  Good  Impulse Control:  Good  Physical/Medical Issues:   see problem list      Assessment / Plan      Visit Diagnosis/Orders Placed This Visit:  Diagnoses and all orders for this visit:    1. Moderate episode of recurrent major depressive disorder (Primary)  -     FLUoxetine (PROzac) 40 MG capsule; Take 1 capsule by mouth Daily.  Dispense: 90 capsule; Refill: 0    2. Generalized anxiety disorder  -     FLUoxetine (PROzac) 40 MG capsule; Take 1 capsule by mouth Daily.  Dispense: 90 capsule; Refill: 0    3. Sleep disorder             Functional Status: No impairment    Prognosis: Good with Ongoing Treatment     Impression/Formulation:  Patient appeared alert and oriented.  Patient is voluntarily requesting to continue outpatient psychiatric treatment at [facility].  Patient is receptive to assistance with maintaining a stable lifestyle.  Patient presents with history of     ICD-10-CM ICD-9-CM   1. Moderate episode of recurrent major depressive disorder  F33.1 296.32   2. Generalized anxiety disorder  F41.1 300.02   3. Sleep disorder  G47.9 780.50   .    Reviewed patient's previous provider notes. Reviewed most recent labs. Patient meets DSM V diagnostic criteria for diagnoses. Diagnoses may be updated as more information becomes available.     Assessment & Plan  1. Anxiety.  Her anxiety levels are currently manageable with the existing dosage of Prozac 40 mg. She reports that her sleep pattern is irregular, often waking up in the middle of the night and unable to return to sleep until later in the morning. She does not feel the need to adjust her  medication at this time. She is advised to consider the use of melatonin 5 mg or magnesium to aid in sleep regulation. A prescription for a 90-day supply of Prozac 40 mg [facility] [address].    2. Depression.  Her depression screens are decent, and she feels stable on her current medication regimen. She does not report any suicidal thoughts, homicidal thoughts, or hallucinations. She will continue taking Prozac 40 mg at night. A follow-up appointment is scheduled for 3 months from now to reassess her condition and the effectiveness of the medication.    Follow-up  The patient will follow up in 3 months or sooner if needed.    Treatment Plan:   Continue prozac 40 mg daily      Patient will continue supportive psychotherapy efforts and medications as indicated. Clinic will obtain release of information for current treatment team for continuity of care as needed. Patient will contact this office, call 911 or present to the nearest emergency room should suicidal or homicidal ideations occur.  Discussed medication options and treatment plan of prescribed medication(s) as well as the risks, benefits, and potential side effects. Patient acknowledged and verbally consented to continue with current treatment plan and was educated on the importance of compliance with treatment and follow-up appointments.     Patient instructions:  Medication risks and side effects discussed with patient including risk for worsening mood, changes in behavior, thoughts of suicide or homicide, induction of parul, serotonin syndrome.   If any thoughts of SI or HI, worsening mood or changes in behavior, call 911 or crisis line [phone], or go to nearest ER at once.  Pt.verbalizes understanding and consents to treatment with this medication.     Follow Up:   Return in about 1 month (around 3/3/2025).    Patient or patient representative verbalized consent for the use of Ambient Listening during the visit with  Mee Guillermo  QUINN for chart documentation. 2/3/2025  10:38 EST    QUINN Hooker, PMHNP-BC [facility]

## 2025-05-05 ENCOUNTER — TELEPHONE (OUTPATIENT)
Dept: INTERNAL MEDICINE | Facility: CLINIC | Age: 30
End: 2025-05-05
Payer: MEDICAID

## 2025-05-05 ENCOUNTER — OFFICE VISIT (OUTPATIENT)
Dept: BEHAVIORAL HEALTH | Facility: CLINIC | Age: 30
End: 2025-05-05
Payer: MEDICAID

## 2025-05-05 VITALS
SYSTOLIC BLOOD PRESSURE: 104 MMHG | WEIGHT: 162.6 LBS | HEART RATE: 76 BPM | BODY MASS INDEX: 23.28 KG/M2 | OXYGEN SATURATION: 99 % | DIASTOLIC BLOOD PRESSURE: 74 MMHG | HEIGHT: 70 IN

## 2025-05-05 DIAGNOSIS — F41.1 GENERALIZED ANXIETY DISORDER: ICD-10-CM

## 2025-05-05 DIAGNOSIS — F33.1 MODERATE EPISODE OF RECURRENT MAJOR DEPRESSIVE DISORDER: ICD-10-CM

## 2025-05-05 PROCEDURE — 99214 OFFICE O/P EST MOD 30 MIN: CPT | Performed by: REGISTERED NURSE

## 2025-05-05 PROCEDURE — 1160F RVW MEDS BY RX/DR IN RCRD: CPT | Performed by: REGISTERED NURSE

## 2025-05-05 PROCEDURE — 1159F MED LIST DOCD IN RCRD: CPT | Performed by: REGISTERED NURSE

## 2025-05-05 PROCEDURE — 96127 BRIEF EMOTIONAL/BEHAV ASSMT: CPT | Performed by: REGISTERED NURSE

## 2025-05-05 RX ORDER — FLUOXETINE HYDROCHLORIDE 40 MG/1
40 CAPSULE ORAL DAILY
Qty: 90 CAPSULE | Refills: 0 | Status: SHIPPED | OUTPATIENT
Start: 2025-05-05

## 2025-05-05 NOTE — PROGRESS NOTES
Follow Up Office Visit      Patient Name: Melani Bhatt  : 1995   MRN: [id number]     Referring Provider: Carmen Lozano PA-C    Chief Complaint:      ICD-10-CM ICD-9-CM   1. Moderate episode of recurrent major depressive disorder  F33.1 296.32   2. Generalized anxiety disorder  F41.1 300.02        History of Present Illness:   Melani Bhatt is a 30 y.o. female who is here today for follow up and medication management    Subjective      Patient Reports:   History of Present Illness  The patient presents for evaluation of depression.    She was last seen 3 months ago, at which time no changes were made to her treatment regimen. She has been on Prozac since then and reports no significant changes in her condition. She does not forget to take the medication. She continues to experience fatigue, particularly when she is tired. Her sleep pattern varies, with early bedtime around 8:00 PM and waking up at 5:00 AM, resulting in fatigue by evening. She typically gets 9 hours of sleep but often feels unrested due to the demands of her job. Morning awakenings are characterized by body soreness, which hinders her from getting up. On weekends, she tends to sleep in and lacks motivation to engage in activities outside of work. Her diet primarily consists of microwave and fast food due to her work schedule, but she reports no weight changes. She does not endorse suicidal ideation, homicidal thoughts, or hallucinations. She expresses concern about her lifestyle, noting a lack of socialization and quality family time. She finds talk therapy unhelpful and misses casual conversations. She has not found a therapist that suits her needs. She is worried about Carmen her PCP leaving because she is the only primary care physician she has ever liked. She has not consulted with other nurse practitioners yet. She has not experienced any adverse effects from Prozac. She works 40 hours a week, with an additional 2 hours for  commuting. She enjoys her job but finds it exhausting, especially when she is tired at the end of the day. She used to practice yoga daily and misses it. She initially took Prozac in the morning but switched to nighttime administration without noticing any difference. She recalls an instance where she took 80 mg of Prozac by accident, which resulted in heightened emotions.    Social History:  - Works 40 hours a week with an additional 2 hours for commuting  - Enjoys job but finds it exhausting  - Used to practice yoga daily  - Lacks socialization and quality family time        Review of Systems:   Review of Systems   Constitutional:  Negative for appetite change and unexpected weight change.   Eyes:  Negative for visual disturbance.   Respiratory:  Negative for chest tightness and shortness of breath.    Cardiovascular:  Negative for chest pain.   Musculoskeletal:  Negative for gait problem.   Skin:  Negative for rash and wound.   Neurological:  Negative for dizziness, tremors, weakness and light-headedness.   Psychiatric/Behavioral:  Positive for dysphoric mood and sleep disturbance. Negative for agitation, behavioral problems, confusion, decreased concentration, hallucinations, self-injury and suicidal ideas. The patient is nervous/anxious. The patient is not hyperactive.      Sleep pattern: 9 hours, feels like she is always tired  Appetite: no changes     PHQ-9 Depression Screening  Little interest or pleasure in doing things? Not at all   Feeling down, depressed, or hopeless? Several days   PHQ-2 Total Score 1   Trouble falling or staying asleep, or sleeping too much? Several days   Feeling tired or having little energy? Several days   Poor appetite or overeating? Several days   Feeling bad about yourself - or that you are a failure or have let yourself or your family down? Several days   Trouble concentrating on things, such as reading the newspaper or watching television? Several days   Moving or speaking so  slowly that other people could have noticed? Or the opposite - being so fidgety or restless that you have been moving around a lot more than usual? Not at all   Thoughts that you would be better off dead, or of hurting yourself in some way? Not at all   PHQ-9 Total Score 6   If you checked off any problems, how difficult have these problems made it for you to do your work, take care of things at home, or get along with other people? Somewhat difficult       OLRE-7 Anxiety Screening  Over the last two weeks, how often have you been bothered by the following problems?  Feeling nervous, anxious or on edge: Several days  Not being able to stop or control worrying: Several days  Worrying too much about different things: Several days  Trouble Relaxing: Several days  Being so restless that it is hard to sit still: Several days  Becoming easily annoyed or irritable: Several days  Feeling afraid as if something awful might happen: Several days  LORE 7 Total Score: 7  If you checked any problems, how difficult have these problems made it for you to do your work, take care of things at home, or get along with other people: Somewhat difficult    RISK ASSESSMENT:  Patient denies any thoughts or intent of suicide today. Patient denies any impulsive behavior today.     Medications:     Current Outpatient Medications:     fexofenadine (ALLEGRA) 60 MG tablet, Take 1 tablet by mouth Daily., Disp: , Rfl:     FLUoxetine (PROzac) 40 MG capsule, Take 1 capsule by mouth Daily., Disp: 90 capsule, Rfl: 0    levETIRAcetam (KEPPRA) 1000 MG tablet, Take 1 tablet by mouth 2 (Two) Times a Day., Disp: 60 tablet, Rfl: 6    Midazolam (Nayzilam) 5 MG/0.1ML solution, Administer 0.1 mL into the nostril(s) as directed by provider As Needed (At the onset of her seizure.  May repeat once in 10 minutes if needed.)., Disp: 2 each, Rfl: 5    norgestimate-ethinyl estradiol (ORTHO-CYCLEN) 0.25-35 MG-MCG per tablet, Take 1 tablet by mouth Daily. Take in a  "continuous fashion, skipping placebos, Disp: 112 tablet, Rfl: 4    FLUoxetine (PROzac) 20 MG capsule, Take 1 capsule by mouth Daily., Disp: 30 capsule, Rfl: 0    Medication Considerations:  RAZA reviewed and appropriate.      Allergies:   No Known Allergies      Objective     Physical Exam:  Vital Signs:   Vitals:    05/05/25 1001 05/05/25 1003   BP:  104/74   Pulse:  76   SpO2:  99%   Weight: 73.8 kg (162 lb 9.6 oz)    Height: 177.8 cm (70\")      Body mass index is 23.33 kg/m².     Mental Status Exam:   Hygiene:   good   Cooperation:  Cooperative  Eye Contact:  Good  Psychomotor Behavior:  Appropriate  Affect:  Appropriate  Mood: anxious  Speech:  Normal  Thought Process:  Goal directed and Linear  Thought Content:  Normal  Suicidal:  None  Homicidal:  None  Hallucinations:  None  Delusion:  None  Memory:  Intact  Orientation:  Person, Place, Time, and Situation  Reliability:  good  Insight:  Good  Judgement:  Good  Impulse Control:  Good  Physical/Medical Issues:   see problem list      Assessment / Plan      Visit Diagnosis/Orders Placed This Visit:  Diagnoses and all orders for this visit:    1. Moderate episode of recurrent major depressive disorder  -     FLUoxetine (PROzac) 40 MG capsule; Take 1 capsule by mouth Daily.  Dispense: 90 capsule; Refill: 0  -     FLUoxetine (PROzac) 20 MG capsule; Take 1 capsule by mouth Daily.  Dispense: 30 capsule; Refill: 0    2. Generalized anxiety disorder  -     FLUoxetine (PROzac) 40 MG capsule; Take 1 capsule by mouth Daily.  Dispense: 90 capsule; Refill: 0  -     FLUoxetine (PROzac) 20 MG capsule; Take 1 capsule by mouth Daily.  Dispense: 30 capsule; Refill: 0             Functional Status: No impairment    Prognosis: Good with Ongoing Treatment     Impression/Formulation:  Patient appeared alert and oriented.  Patient is voluntarily requesting to continue outpatient psychiatric treatment at [facility].  Patient is receptive to assistance with " maintaining a stable lifestyle.  Patient presents with history of     ICD-10-CM ICD-9-CM   1. Moderate episode of recurrent major depressive disorder  F33.1 296.32   2. Generalized anxiety disorder  F41.1 300.02   .    Reviewed patient's previous provider notes. Reviewed most recent labs. Patient meets DSM V diagnostic criteria for diagnoses. Diagnoses may be updated as more information becomes available.     Assessment & Plan  The patient discussed her ongoing symptoms of depression, including persistent fatigue and lack of significant improvement since switching from Zoloft to Prozac. She mentioned her physically demanding job and lack of social interaction as contributing factors to her fatigue. The patient expressed concerns about her medication's effectiveness and discussed her daily routine, including sleep patterns and eating habits. She also shared her feelings about missing yoga and the impact of her commute on her overall well-being.    The patient continues to experience symptoms of depression, with no significant improvement noted since the switch from Zoloft to Prozac. She reports persistent fatigue, which may be exacerbated by her physically demanding job and lack of social interaction. Despite taking Prozac consistently, she feels that her mood has not significantly improved. The patient does not report any suicidal thoughts, homicidal thoughts, or hallucinations. Her sleep patterns and eating habits indicate potential lifestyle factors contributing to her symptoms.    The decision was made to continue with Prozac for now. The importance of self-care and maintaining a balanced lifestyle was emphasized.    Plan:  - Increase Prozac dosage to 60 mg daily, using a combination of 40 mg and 20 mg capsules due to insurance limitations.  - Provide a prescription of both 40 mg and 20 mg capsules.  - Reevaluate the patient's response to the increased dosage after 1 month.  - Consider transitioning to an  alternative medication if there is no improvement after 1 month.    Follow-up:  - Schedule a follow-up appointment in 1 month to assess the patient's response to the increased Prozac dosage.  - The patient may contact the clinic sooner if necessary.      Patient will continue supportive psychotherapy efforts and medications as indicated. Clinic will obtain release of information for current treatment team for continuity of care as needed. Patient will contact this office, call [phone] or present to the nearest emergency room should suicidal or homicidal ideations occur.  Discussed medication options and treatment plan of prescribed medication(s) as well as the risks, benefits, and potential side effects. Patient acknowledged and verbally consented to continue with current treatment plan and was educated on the importance of compliance with treatment and follow-up appointments.     Patient instructions:  Instructed will take 4-6 weeks for full therapeutic effect. Medication risks and side effects discussed with patient including risk for worsening mood, changes in behavior, thoughts of suicide or homicide, induction of parul, serotonin syndrome.   If any thoughts of SI or HI, worsening mood or changes in behavior, call 911 or crisis line [phone], or go to nearest ER at once. . Pt.verbalizes understanding and consents to treatment with this medication.         Patient or patient representative verbalized consent for the use of Ambient Listening during the visit with  QUINN Hooker for chart documentation. 5/5/2025  11:55 EDT    QUINN Hooker, [facility]-BC [facility]

## 2025-05-05 NOTE — TELEPHONE ENCOUNTER
HUB TO READ    PATIENT CAME IN FOR VISIT WITH CHAR DAY AND I NOTICED THAT ALEXIA REDD IS HER PCP.  I MENTIONED THAT ALEXIA WILL BE LEAVING OUR PRACTICE AND IF SHE WANTED TO ESTABLISH WITH A NEW PROVIDER.  SHE DECLINES AT THIS TIME.

## 2025-06-09 ENCOUNTER — OFFICE VISIT (OUTPATIENT)
Dept: NEUROLOGY | Facility: CLINIC | Age: 30
End: 2025-06-09
Payer: MEDICAID

## 2025-06-09 ENCOUNTER — OFFICE VISIT (OUTPATIENT)
Dept: BEHAVIORAL HEALTH | Facility: CLINIC | Age: 30
End: 2025-06-09
Payer: MEDICAID

## 2025-06-09 VITALS
HEART RATE: 77 BPM | OXYGEN SATURATION: 98 % | WEIGHT: 160 LBS | HEIGHT: 70 IN | BODY MASS INDEX: 22.9 KG/M2 | SYSTOLIC BLOOD PRESSURE: 118 MMHG | DIASTOLIC BLOOD PRESSURE: 76 MMHG

## 2025-06-09 VITALS
OXYGEN SATURATION: 100 % | BODY MASS INDEX: 23.02 KG/M2 | WEIGHT: 160.8 LBS | SYSTOLIC BLOOD PRESSURE: 112 MMHG | HEART RATE: 76 BPM | DIASTOLIC BLOOD PRESSURE: 70 MMHG | HEIGHT: 70 IN

## 2025-06-09 DIAGNOSIS — F33.1 MODERATE EPISODE OF RECURRENT MAJOR DEPRESSIVE DISORDER: ICD-10-CM

## 2025-06-09 DIAGNOSIS — G40.109 PARTIAL SYMPTOMATIC EPILEPSY WITH SIMPLE PARTIAL SEIZURES, NOT INTRACTABLE, WITHOUT STATUS EPILEPTICUS: Primary | ICD-10-CM

## 2025-06-09 DIAGNOSIS — F41.1 GENERALIZED ANXIETY DISORDER: ICD-10-CM

## 2025-06-09 PROCEDURE — 1159F MED LIST DOCD IN RCRD: CPT | Performed by: REGISTERED NURSE

## 2025-06-09 PROCEDURE — 1159F MED LIST DOCD IN RCRD: CPT | Performed by: PSYCHIATRY & NEUROLOGY

## 2025-06-09 PROCEDURE — 1160F RVW MEDS BY RX/DR IN RCRD: CPT | Performed by: PSYCHIATRY & NEUROLOGY

## 2025-06-09 PROCEDURE — 99214 OFFICE O/P EST MOD 30 MIN: CPT | Performed by: REGISTERED NURSE

## 2025-06-09 PROCEDURE — 99213 OFFICE O/P EST LOW 20 MIN: CPT | Performed by: PSYCHIATRY & NEUROLOGY

## 2025-06-09 PROCEDURE — 96127 BRIEF EMOTIONAL/BEHAV ASSMT: CPT | Performed by: REGISTERED NURSE

## 2025-06-09 PROCEDURE — 1160F RVW MEDS BY RX/DR IN RCRD: CPT | Performed by: REGISTERED NURSE

## 2025-06-09 RX ORDER — LEVETIRACETAM 1000 MG/1
1000 TABLET ORAL 2 TIMES DAILY
Qty: 60 TABLET | Refills: 9 | Status: SHIPPED | OUTPATIENT
Start: 2025-06-09

## 2025-06-09 RX ORDER — FLUOXETINE HYDROCHLORIDE 40 MG/1
40 CAPSULE ORAL DAILY
Qty: 90 CAPSULE | Refills: 0 | Status: SHIPPED | OUTPATIENT
Start: 2025-06-09

## 2025-06-09 NOTE — PROGRESS NOTES
"     Follow Up Office Visit      Patient Name: Melani Bhatt  : 1995   MRN: [id number]     Referring Provider: Carmen Lozano PA-C    Chief Complaint:      ICD-10-CM ICD-9-CM   1. Moderate episode of recurrent major depressive disorder  F33.1 296.32   2. Generalized anxiety disorder  F41.1 300.02        History of Present Illness:   Melani Bhatt is a 30 y.o. female who is here today for follow up and medication management    Subjective      Patient Reports:   History of Present Illness  The patient presents for evaluation of depression.    She reports a positive response to the increased dosage of Prozac, with no significant changes in her condition. She has been experiencing fatigue due to work-related stress and has recently initiated a romantic relationship. Her sleep pattern is consistent, retiring at 9:00 or 9:30 PM and waking up between 6:00 and 6:30 AM, although she does not always feel fully rested upon awakening. She attributes this lack of restfulness to physical exertion at work, which results in morning stiffness. She has incorporated yoga into her daily routine as a means of managing this stiffness. She takes her Prozac at night and does not believe it contributes to her sleepiness. She reports no alterations in appetite, suicidal ideation, homicidal thoughts, auditory or visual hallucinations, or increased irritability or agitation. She is not currently engaged in therapy and has no interest in pursuing it. She has been socially active this month, which has included occasional alcohol consumption, particularly during weekends and a recent wedding event. She acknowledges that this may be contributing to her fatigue.    The patient reports feeling \"pretty good\" overall with the increased Prozac dosage. She has been exhausted due to work and has been sleeping \"really hard.\" Despite these issues, she feels \"pretty good\" and notes no significant changes in her condition.    - Recently started " a romantic relationship  - Socially active this month  - Attended a wedding event    The patient drinks alcohol occasionally, primarily on weekends. She consumed alcohol over three consecutive days during a recent wedding event.      SOCIAL HISTORY  The patient drinks alcohol on the weekends, usually two or three drinks, but had more during a recent wedding.    Review of Systems:   Review of Systems   Constitutional:  Negative for appetite change and unexpected weight change.   Eyes:  Negative for visual disturbance.   Respiratory:  Negative for chest tightness and shortness of breath.    Cardiovascular:  Negative for chest pain.   Musculoskeletal:  Negative for gait problem.   Skin:  Negative for rash and wound.   Neurological:  Negative for dizziness, tremors, weakness and light-headedness.   Psychiatric/Behavioral:  Negative for agitation, behavioral problems, confusion, decreased concentration, hallucinations, self-injury and suicidal ideas. The patient is not hyperactive.      Sleep pattern: 8-9 hours, not always well rested  Appetite: no changes     PHQ-9 Depression Screening  Little interest or pleasure in doing things? Several days   Feeling down, depressed, or hopeless? Several days   PHQ-2 Total Score 2   Trouble falling or staying asleep, or sleeping too much? Several days   Feeling tired or having little energy? Over half   Poor appetite or overeating? Not at all   Feeling bad about yourself - or that you are a failure or have let yourself or your family down? Not at all   Trouble concentrating on things, such as reading the newspaper or watching television? Several days   Moving or speaking so slowly that other people could have noticed? Or the opposite - being so fidgety or restless that you have been moving around a lot more than usual? Not at all   Thoughts that you would be better off dead, or of hurting yourself in some way? Not at all   PHQ-9 Total Score 6   If you checked off any problems, how  difficult have these problems made it for you to do your work, take care of things at home, or get along with other people? Somewhat difficult       LORE-7 Anxiety Screening  Over the last two weeks, how often have you been bothered by the following problems?  Feeling nervous, anxious or on edge: Several days  Not being able to stop or control worrying: Several days  Worrying too much about different things: Several days  Trouble Relaxing: Not at all  Being so restless that it is hard to sit still: Not at all  Becoming easily annoyed or irritable: Several days  Feeling afraid as if something awful might happen: Not at all  LORE 7 Total Score: 4  If you checked any problems, how difficult have these problems made it for you to do your work, take care of things at home, or get along with other people: Somewhat difficult    RISK ASSESSMENT:  Patient denies any thoughts or intent of suicide today. Patient denies any impulsive behavior today.     Medications:     Current Outpatient Medications:     fexofenadine (ALLEGRA) 60 MG tablet, Take 1 tablet by mouth Daily., Disp: , Rfl:     FLUoxetine (PROzac) 20 MG capsule, Take 1 capsule by mouth Daily., Disp: 90 capsule, Rfl: 0    FLUoxetine (PROzac) 40 MG capsule, Take 1 capsule by mouth Daily., Disp: 90 capsule, Rfl: 0    levETIRAcetam (KEPPRA) 1000 MG tablet, Take 1 tablet by mouth 2 (Two) Times a Day., Disp: 60 tablet, Rfl: 6    Midazolam (Nayzilam) 5 MG/0.1ML solution, Administer 0.1 mL into the nostril(s) as directed by provider As Needed (At the onset of her seizure.  May repeat once in 10 minutes if needed.)., Disp: 2 each, Rfl: 5    norgestimate-ethinyl estradiol (ORTHO-CYCLEN) 0.25-35 MG-MCG per tablet, Take 1 tablet by mouth Daily. Take in a continuous fashion, skipping placebos, Disp: 112 tablet, Rfl: 4    Medication Considerations:  RAZA reviewed and appropriate.      Allergies:   No Known Allergies      Objective     Physical Exam:  Vital Signs:   Vitals:     "06/09/25 1224 06/09/25 1225 06/09/25 1226   BP:   118/76   Pulse:   77   SpO2:   98%   Weight:  72.6 kg (160 lb)    Height: 177.8 cm (70\")       Body mass index is 22.96 kg/m².     Mental Status Exam:   Hygiene:   good   Cooperation:  Cooperative  Eye Contact:  Good  Psychomotor Behavior:  Appropriate  Affect:  Appropriate  Mood: normal  Speech:  Normal  Thought Process:  Goal directed and Linear  Thought Content:  Normal  Suicidal:  None  Homicidal:  None  Hallucinations:  None  Delusion:  None  Memory:  Intact  Orientation:  Person, Place, Time, and Situation  Reliability:  good  Insight:  Good  Judgement:  Good  Impulse Control:  Good  Physical/Medical Issues:  see problem list      Assessment / Plan      Visit Diagnosis/Orders Placed This Visit:  Diagnoses and all orders for this visit:    1. Moderate episode of recurrent major depressive disorder  -     FLUoxetine (PROzac) 40 MG capsule; Take 1 capsule by mouth Daily.  Dispense: 90 capsule; Refill: 0  -     FLUoxetine (PROzac) 20 MG capsule; Take 1 capsule by mouth Daily.  Dispense: 90 capsule; Refill: 0    2. Generalized anxiety disorder  -     FLUoxetine (PROzac) 40 MG capsule; Take 1 capsule by mouth Daily.  Dispense: 90 capsule; Refill: 0  -     FLUoxetine (PROzac) 20 MG capsule; Take 1 capsule by mouth Daily.  Dispense: 90 capsule; Refill: 0             Functional Status: No impairment    Prognosis: Good with Ongoing Treatment     Impression/Formulation:  Patient appeared alert and oriented.  Patient is voluntarily requesting to continue outpatient psychiatric treatment at [facility].  Patient is receptive to assistance with maintaining a stable lifestyle.  Patient presents with history of     ICD-10-CM ICD-9-CM   1. Moderate episode of recurrent major depressive disorder  F33.1 296.32   2. Generalized anxiety disorder  F41.1 300.02   .    Reviewed patient's previous provider notes. Reviewed most recent labs. Patient meets DSM V " diagnostic criteria for diagnoses. Diagnoses may be updated as more information becomes available.     Assessment & Plan    - Discussed the patient's response to the increased dose of Prozac (60 mg).  - Explored feelings of exhaustion related to work and social activities.  - Addressed sleep patterns and physical soreness due to work.  - Talked about social activities, including alcohol consumption and its impact on mental health.  - Confirmed no suicidal thoughts, thoughts of harming others, or hallucinations.    The patient reports feeling good on the current dose of Prozac 60 mg, with no significant changes in mood or increased irritability. She is sleeping adequately, averaging 8-9 hours per night, but feels tired and physically sore due to her physically demanding job and social activities. She has been more social recently, which includes drinking alcohol on weekends. She acknowledges the potential impact of alcohol on her mental health and medication. There are no reports of suicidal ideation, thoughts of harming others, or hallucinations.    - Reframed thoughts about the impact of alcohol on mental health and medication.  - Encouraged monitoring and reducing alcohol intake.  - Discussed the importance of maintaining the current medication regimen.    Plan:  - Continue Prozac 60 mg (40 mg and 20 mg tablets).  - Monitor alcohol intake and reduce consumption.  - Maintain current sleep hygiene practices.  - Engage in regular physical activity, such as yoga, to alleviate physical soreness.    Follow-up:  - Follow-up appointment scheduled in 6 weeks.      Patient will continue supportive psychotherapy efforts and medications as indicated. Clinic will obtain release of information for current treatment team for continuity of care as needed. Patient will contact this office, call [phone] or present to the nearest emergency room should suicidal or homicidal ideations occur.  Discussed medication options and treatment  plan of prescribed medication(s) as well as the risks, benefits, and potential side effects. Patient acknowledged and verbally consented to continue with current treatment plan and was educated on the importance of compliance with treatment and follow-up appointments.     Patient instructions:  Medication risks and side effects discussed with patient including risk for worsening mood, changes in behavior, thoughts of suicide or homicide, induction of parul, serotonin syndrome.   If any thoughts of SI or HI, worsening mood or changes in behavior, call 911 or crisis line [phone], or go to nearest ER at once. Pt.verbalizes understanding and consents to treatment with this medication.         Patient or patient representative verbalized consent for the use of Ambient Listening during the visit with  QUINN Hooker for chart documentation. 6/9/2025  13:41 EDT    QUINN Hooker, [facility]

## 2025-06-09 NOTE — PROGRESS NOTES
Subjective:    CC: Melani Bhatt is seen todayl for Follow-up ( Partial symptomatic epilepsy with simple partial seizures, not intractable, without status epilepticus)       Current visit-patient denies any seizures since she last saw me with her last episode of confusion being in September 2024 and last GTC in June of last year.  She continues to be compliant with Keppra 1000 mg twice daily.  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  She did have 1 episode of binge alcohol drinking at a social gathering but otherwise denies any heavy alcohol use.    Last visit-patient denies having any seizures since she last saw me.  Her last GTC was on 6/10 she did have a minor aura lasting for 1 to 2 seconds recently when she was dehydrated but otherwise denies having any episodes with loss of awareness/déjà vu with her last episode being in September.  In October she did increase her dose of Keppra to 1000 mg twice daily.  Her last Keppra level prior to the change was 25.9 (normal 10-40).  Her Nayzilam was also approved but she has not had to use it.  She has cut back on her binge alcohol drinking over the weekend but did drink some over the holidays.    Last visit-patient denies having any generalized tonic-clonic seizures since she last saw me with her last episode being on 6/10.  She has however continued to have her auras where she gets a sense of déjà vu with a feeling of facial flushing/nausea.  Along with these auras she has also had occasional word finding difficulties specially at work.  She had her 2-hour sleep deprived video EEG that was normal.  On 9/13 I had increased her Keppra to 750 mg twice daily which she is tolerating well however she did have 1 aura on 9/26 and 2 on 9/27 without any loss of awareness or confusion.  She sleeps well at night and has also cut back on her alcohol intake.    Initial  [id number]-old female accompanied by her mother with a past medical history of depression presents with new onset seizures.  As per patient she was cooking a meal in the kitchen on the morning of 6/10 when she went down to sit.  Soon afterwards her mother noticed that she was slumped over in the chair trying to reach out with her right hand.  When she tried to straighten her up the patient stiffened up and started to have whole body shaking.  The episode lasted for a few minutes.  She denies any tongue bite or bowel/bladder incontinence.  By the time EMS arrived patient was getting back to her normal self answering questions although she has no recollection of this.  In the ER while talking to the physician she had another generalized tonic-clonic seizure.  Was extremely agitated and combative after that for about 30 minutes and had to be given IV sedation including Versed several times.  She had a CT head and MRI brain both of which did not show any significant chronic ischemic changes, mesial temporal sclerosis or acute intracranial abnormalities.  Also had an EEG that showed mild generalized slowing as well as excessive beta activity but no epileptiform activity.  In addition she had a lumbar puncture that was unremarkable.  Her white count was slightly elevated at 12.1 and calcium was 8.1.  U tox was positive for THC and benzos.  Alcohol was negative even though patient states that she typically has 3-4 alcoholic drinks twice a week on her days off (Sunday and Monday).  Does not remember if she had any alcohol the night before or not. Also denies being sick or sleep deprived.  She was started on Keppra 500 mg twice daily that she has continued to take without any adverse effects.  A few months prior to these episodes she started to have brief episodes of anxiety where she has facial flushing, feels nauseous with a sense of déjà vu lasting for about 1 to 2 minutes.  These were occurring about once a month but  since her seizures in June have now been occurring about once a week.  Past history-mom denies any abnormal birth history (she was born 3 weeks prematurely but was sent home immediately), family history of seizures, concussions or febrile seizures as a child  Of note-I personally reviewed her MRI brain and the hospital notes    The following portions of the patient's history were reviewed today and updated as of 07/25/2024  : allergies, current medications, past family history, past medical history, past social history, past surgical history, and problem list  These document will be scanned to patient's chart.      Current Outpatient Medications:     fexofenadine (ALLEGRA) 60 MG tablet, Take 1 tablet by mouth Daily., Disp: , Rfl:     FLUoxetine (PROzac) 40 MG capsule, Take 1 capsule by mouth Daily., Disp: 90 capsule, Rfl: 0    levETIRAcetam (KEPPRA) 1000 MG tablet, Take 1 tablet by mouth 2 (Two) Times a Day., Disp: 60 tablet, Rfl: 9    Midazolam (Nayzilam) 5 MG/0.1ML solution, Administer 0.1 mL into the nostril(s) as directed by provider As Needed (At the onset of her seizure.  May repeat once in 10 minutes if needed.)., Disp: 2 each, Rfl: 5    norgestimate-ethinyl estradiol (ORTHO-CYCLEN) 0.25-35 MG-MCG per tablet, Take 1 tablet by mouth Daily. Take in a continuous fashion, skipping placebos, Disp: 112 tablet, Rfl: 4    FLUoxetine (PROzac) 20 MG capsule, Take 1 capsule by mouth Daily. (Patient not taking: Reported on 6/9/2025), Disp: 90 capsule, Rfl: 0   Past Medical History:   Diagnosis Date    Acne     Allergic     Depression     Genital herpes     Heart murmur     as a child    Herpes genitalis     Irregular menses     Ovarian cyst     Pap smear for cervical cancer screening 08/2016    Seizures     Sleep disorder 2/3/2025    Vegan diet       Past Surgical History:   Procedure Laterality Date    WISDOM TOOTH EXTRACTION      x4      Family History   Problem Relation Age of Onset    Breast cancer Mother      "Arthritis Father     Hypertension Father     Arthritis Maternal Grandmother     Breast cancer Maternal Grandmother     Hypertension Maternal Grandmother     Hypertension Maternal Grandfather     Arthritis Paternal Grandmother     Ovarian cancer Paternal Grandmother     Breast cancer Paternal Grandmother     Heart attack Paternal Grandmother     Hypertension Paternal Grandmother     Dementia Paternal Grandmother     Hypertension Paternal Grandfather     Stroke Paternal Grandfather     Kidney cancer Other     ADD / ADHD Sister       Social History     Socioeconomic History    Marital status: Single   Tobacco Use    Smoking status: Never     Passive exposure: Never    Smokeless tobacco: Never   Vaping Use    Vaping status: Every Day    Start date: 1/1/2020    Last attempt to quit: 1/1/2023    Substances: THC, Delta 8    Devices: Pre-filled or refillable cartridge    Passive vaping exposure: Yes   Substance and Sexual Activity    Alcohol use: Yes     Alcohol/week: 12.0 standard drinks of alcohol     Types: 10 Cans of beer, 2 Drinks containing 0.5 oz of alcohol per week    Drug use: No    Sexual activity: Yes     Partners: Female, Male     Birth control/protection: Condom, Birth control pill, OCP     Review of Systems   Neurological:  Positive for seizures.   All other systems reviewed and are negative.      Objective:    /70   Pulse 76   Ht 177.8 cm (70\")   Wt 72.9 kg (160 lb 12.8 oz)   SpO2 100%   Breastfeeding No   BMI 23.07 kg/m²     Neurology Exam:    General apperance: NAD.     Mental status: Alert, awake and oriented to time place and person.    Recent and Remote memory: Intact.    Attention span and Concentration: Normal.     Language and Speech: Intact- No dysarthria.    Fluency, Naming , Repitition and Comprehension:  Intact    Cranial Nerves:   CN II: Visual fields are full. Intact. Fundi - Normal, No papillederma, Pupils - AUDI  CN III, IV and VI: Extraocular movements are intact. Normal " saccades.   CN V: Facial sensation is intact.   CN VII: Muscles of facial expression reveal no asymmetry. Intact.   CN VIII: Hearing is intact. Whispered voice intact.   CN IX and X: Palate elevates symmetrically. Intact  CN XI: Shoulder shrug is intact.   CN XII: Tongue is midline without evidence of atrophy or fasciculation.     Ophthalmoscopic exam of optic disc-normal    Motor:  Right UE muscle strength 5/5. Normal tone.     Left UE muscle strength 5/5. Normal tone.      Right LE muscle strength5/5. Normal tone.     Left LE muscle strength 5/5. Normal tone.      Sensory: Normal light touch, vibration and pinprick sensation bilaterally.    DTRs: 2+ bilaterally in upper and lower extremities.    Babinski: Negative bilaterally.    Co-ordination: Normal finger-to-nose, heel to shin B/L.    Rhomberg: Negative.    Gait: Normal.    Cardiovascular: Regular rate and rhythm without murmur, gallop or rub.    Assessment and Plan:  1.  Focal epilepsy  Based on her episode semiology she most likely has focal seizures.  Previously MRI brain and routine EEG were unremarkable.  2 hours sleep deprived video EEG was also normal (she did not sleep at all during the test)  She should continue Keppra 1000 mg twice daily and Nayzilam 5 mg as needed at the onset of her aura  Counseling given on seizure precautions, medication compliance and avoiding binge alcohol drinking.  She should also keep herself well-hydrated         Return in about 6 months (around 12/9/2025).       Eileen Hurst MD

## 2025-06-20 DIAGNOSIS — Z30.41 ENCOUNTER FOR SURVEILLANCE OF CONTRACEPTIVE PILLS: ICD-10-CM

## 2025-06-20 DIAGNOSIS — Z01.419 ENCOUNTER FOR ANNUAL ROUTINE GYNECOLOGICAL EXAMINATION: ICD-10-CM

## 2025-06-20 RX ORDER — NORGESTIMATE AND ETHINYL ESTRADIOL 0.25-0.035
KIT ORAL
Qty: 336 TABLET | Refills: 0 | OUTPATIENT
Start: 2025-06-20

## 2025-06-20 RX ORDER — NORGESTIMATE AND ETHINYL ESTRADIOL 0.25-0.035
1 KIT ORAL DAILY
Qty: 28 TABLET | Refills: 0 | Status: SHIPPED | OUTPATIENT
Start: 2025-06-20

## 2025-06-20 NOTE — TELEPHONE ENCOUNTER
Caller: Melani Bhatt    Relationship: Self    Best call back number:     Requested Prescriptions:   Requested Prescriptions     Pending Prescriptions Disp Refills    norgestimate-ethinyl estradiol (ORTHO-CYCLEN) 0.25-35 MG-MCG per tablet 112 tablet 4     Sig: Take 1 tablet by mouth Daily. Take in a continuous fashion, skipping placebos        Pharmacy where request should be sent: [facility] [address] [facility] [facility] - [phone]  - [phone] FX     Last office visit with prescribing clinician: 9/16/2024   Last telemedicine visit with prescribing clinician: Visit date not found   Next office visit with prescribing clinician: 7/7/2025     Additional details provided by patient: PATIENT IS OUT OF MEDICATION AND ASKING IF SHE CAN GET ENOUGH UNTIL HER NEXT APPT [id number]    Does the patient have less than a 3 day supply:  [x] Yes  [] No      Pablo Moore Rep   06/20/25 08:50 EDT

## 2025-06-20 NOTE — TELEPHONE ENCOUNTER
Hub to relay    Lvm for patient notifying her that a one month supply was sent to her pharmacy. She would need an updated pap smear for further refills.

## 2025-07-07 ENCOUNTER — OFFICE VISIT (OUTPATIENT)
Dept: INTERNAL MEDICINE | Facility: CLINIC | Age: 30
End: 2025-07-07
Payer: MEDICAID

## 2025-07-07 VITALS
HEART RATE: 88 BPM | RESPIRATION RATE: 12 BRPM | HEIGHT: 70 IN | DIASTOLIC BLOOD PRESSURE: 78 MMHG | BODY MASS INDEX: 22.56 KG/M2 | TEMPERATURE: 97.4 F | OXYGEN SATURATION: 99 % | WEIGHT: 157.6 LBS | SYSTOLIC BLOOD PRESSURE: 110 MMHG

## 2025-07-07 DIAGNOSIS — Z30.41 ENCOUNTER FOR SURVEILLANCE OF CONTRACEPTIVE PILLS: ICD-10-CM

## 2025-07-07 DIAGNOSIS — Z01.419 ENCOUNTER FOR ANNUAL ROUTINE GYNECOLOGICAL EXAMINATION: ICD-10-CM

## 2025-07-07 PROCEDURE — 99213 OFFICE O/P EST LOW 20 MIN: CPT

## 2025-07-07 PROCEDURE — 1126F AMNT PAIN NOTED NONE PRSNT: CPT

## 2025-07-07 RX ORDER — NORGESTIMATE AND ETHINYL ESTRADIOL 0.25-0.035
1 KIT ORAL DAILY
Qty: 28 TABLET | Refills: 11 | Status: SHIPPED | OUTPATIENT
Start: 2025-07-07

## 2025-07-07 NOTE — PROGRESS NOTES
"     Follow Up Office Visit      Date: 2025  Patient Name: Melani Bhatt  : 1995   MRN: [id number]     Chief Complaint:    Chief Complaint   Patient presents with    Contraception       History of Present Illness: Melani Bhatt is a 30 y.o. female who is here today for OCP refill. Pap smear not up to date, agreeable for this to be done today. No GYN concerns. Would like routine STD testing.     Subjective      Review of Systems:   Review of Systems   Genitourinary:  Negative for dysuria, pelvic pain, vaginal bleeding, vaginal discharge and vaginal pain.       Medications:     Current Outpatient Medications:     fexofenadine (ALLEGRA) 60 MG tablet, Take 1 tablet by mouth Daily., Disp: , Rfl:     FLUoxetine (PROzac) 20 MG capsule, Take 1 capsule by mouth Daily., Disp: 90 capsule, Rfl: 0    FLUoxetine (PROzac) 40 MG capsule, Take 1 capsule by mouth Daily., Disp: 90 capsule, Rfl: 0    levETIRAcetam (KEPPRA) 1000 MG tablet, Take 1 tablet by mouth 2 (Two) Times a Day., Disp: 60 tablet, Rfl: 9    Midazolam (Nayzilam) 5 MG/0.1ML solution, Administer 0.1 mL into the nostril(s) as directed by provider As Needed (At the onset of her seizure.  May repeat once in 10 minutes if needed.)., Disp: 2 each, Rfl: 5    norgestimate-ethinyl estradiol (ORTHO-CYCLEN) 0.25-35 MG-MCG per tablet, Take 1 tablet by mouth Daily. Take in a continuous fashion, skipping placebos, Disp: 28 tablet, Rfl: 11    Allergies:   No Known Allergies    Objective     Physical Exam:  Vital Signs:   Vitals:    25 1053   BP: 110/78   Pulse: 88   Resp: 12   Temp: 97.4 °F (36.3 °C)   TempSrc: Temporal   SpO2: 99%   Weight: 71.5 kg (157 lb 9.6 oz)   Height: 177.8 cm (70\")   PainSc: 0-No pain     Body mass index is 22.61 kg/m².   Facility age limit for growth %huang is 20 years.  BMI is within normal parameters. No other follow-up for BMI required.       Physical Exam  Vitals and nursing note reviewed. Exam conducted with a chaperone present. "   Constitutional:       General: She is not in acute distress.     Appearance: Normal appearance.   Eyes:      Extraocular Movements: Extraocular movements intact.      Conjunctiva/sclera: Conjunctivae normal.   Pulmonary:      Effort: Pulmonary effort is normal. No respiratory distress.   Chest:   Breasts:     Right: No swelling, bleeding, inverted nipple, mass, nipple discharge, skin change or tenderness.      Left: No swelling, bleeding, inverted nipple, mass, nipple discharge, skin change or tenderness.   Genitourinary:     Exam position: Lithotomy position.      Labia:         Right: No rash, tenderness, lesion or injury.         Left: No rash, tenderness, lesion or injury.       Vagina: No foreign body. Vaginal discharge present. No erythema, tenderness, bleeding, lesions or prolapsed vaginal walls.      Cervix: Discharge, erythema and cervical bleeding (with sample collection) present. No cervical motion tenderness or lesion.      Adnexa: Right adnexa normal and left adnexa normal.      Comments: Difficult exam, copious white/greenish dc on exam  Neurological:      General: No focal deficit present.      Mental Status: She is alert and oriented to person, place, and time. Mental status is at baseline.   Psychiatric:         Mood and Affect: Mood normal.         Behavior: Behavior normal.         Assessment / Plan      Assessment/Plan:      Encounter for annual routine gynecological examination    Orders:    norgestimate-ethinyl estradiol (ORTHO-CYCLEN) 0.25-35 MG-MCG per tablet; Take 1 tablet by mouth Daily. Take in a continuous fashion, skipping placebos    LIQUID-BASED PAP SMEAR WITH HPV GENOTYPING REGARDLESS OF INTERPRETATION ([facility]); Future    Encounter for surveillance of contraceptive pills    Orders:    norgestimate-ethinyl estradiol (ORTHO-CYCLEN) 0.25-35 MG-MCG per tablet; Take 1 tablet by mouth Daily. Take in a continuous fashion, skipping placebos           Follow Up:   Return for Next  scheduled follow up.      Carmen Lozano PA-C    [facility]

## 2025-07-14 ENCOUNTER — RESULTS FOLLOW-UP (OUTPATIENT)
Dept: INTERNAL MEDICINE | Facility: CLINIC | Age: 30
End: 2025-07-14
Payer: MEDICAID

## 2025-07-14 LAB — REF LAB TEST METHOD: NORMAL

## 2025-07-21 ENCOUNTER — OFFICE VISIT (OUTPATIENT)
Dept: BEHAVIORAL HEALTH | Facility: CLINIC | Age: 30
End: 2025-07-21
Payer: MEDICAID

## 2025-07-21 VITALS
DIASTOLIC BLOOD PRESSURE: 74 MMHG | HEIGHT: 70 IN | SYSTOLIC BLOOD PRESSURE: 126 MMHG | BODY MASS INDEX: 23.11 KG/M2 | WEIGHT: 161.4 LBS

## 2025-07-21 DIAGNOSIS — F41.1 GENERALIZED ANXIETY DISORDER: ICD-10-CM

## 2025-07-21 DIAGNOSIS — F33.1 MODERATE EPISODE OF RECURRENT MAJOR DEPRESSIVE DISORDER: Primary | ICD-10-CM

## 2025-07-21 PROCEDURE — 1159F MED LIST DOCD IN RCRD: CPT | Performed by: REGISTERED NURSE

## 2025-07-21 PROCEDURE — 96127 BRIEF EMOTIONAL/BEHAV ASSMT: CPT | Performed by: REGISTERED NURSE

## 2025-07-21 PROCEDURE — 99214 OFFICE O/P EST MOD 30 MIN: CPT | Performed by: REGISTERED NURSE

## 2025-07-21 PROCEDURE — 1160F RVW MEDS BY RX/DR IN RCRD: CPT | Performed by: REGISTERED NURSE

## 2025-07-21 RX ORDER — FLUOXETINE HYDROCHLORIDE 40 MG/1
40 CAPSULE ORAL DAILY
Qty: 90 CAPSULE | Refills: 0 | Status: SHIPPED | OUTPATIENT
Start: 2025-07-21

## 2025-07-21 NOTE — PROGRESS NOTES
Follow Up Office Visit      Patient Name: Melani Bhatt  : 1995   MRN: [id number]     Referring Provider: Carmen Lozano PA-C    Chief Complaint:      ICD-10-CM ICD-9-CM   1. Moderate episode of recurrent major depressive disorder  F33.1 296.32   2. Generalized anxiety disorder  F41.1 300.02        History of Present Illness:   Melani Bhatt is a 30 y.o. female who is here today for follow up and medication management    Subjective      Patient Reports:   History of Present Illness  The patient presents for anxiety.    She reports a stable condition with her anxiety, which is well-managed. Her sleep pattern is regular, averaging 9 hours per night, although she does not feel fully rested upon waking. She occasionally takes daytime naps, particularly on weekends and during her recent vacation. Her appetite remains unchanged. She reports no suicidal ideation or thoughts of harming others. She also does not experience any auditory or visual hallucinations. She expresses satisfaction with her current state and wishes to maintain it.     She reports feeling stable and well-managed on her current medication regimen.     She admits to drinking alcohol during her vacation but has not consumed any since Saturday.    Review of Systems:   Review of Systems   Constitutional:  Negative for appetite change and unexpected weight change.   Eyes:  Negative for visual disturbance.   Respiratory:  Negative for chest tightness and shortness of breath.    Cardiovascular:  Negative for chest pain.   Musculoskeletal:  Negative for gait problem.   Skin:  Negative for rash and wound.   Neurological:  Negative for dizziness, tremors, weakness and light-headedness.   Psychiatric/Behavioral:  Negative for agitation, behavioral problems, decreased concentration, hallucinations, self-injury and suicidal ideas. The patient is not hyperactive.      Sleep pattern: 9 hours, not well rested  Appetite: no changes     PHQ-9 Depression  Screening  Little interest or pleasure in doing things? Not at all   Feeling down, depressed, or hopeless? Several days   PHQ-2 Total Score 1   Trouble falling or staying asleep, or sleeping too much? Several days   Feeling tired or having little energy? Several days   Poor appetite or overeating? Several days   Feeling bad about yourself - or that you are a failure or have let yourself or your family down? Several days   Trouble concentrating on things, such as reading the newspaper or watching television? Several days   Moving or speaking so slowly that other people could have noticed? Or the opposite - being so fidgety or restless that you have been moving around a lot more than usual? Not at all   Thoughts that you would be better off dead, or of hurting yourself in some way? Not at all   PHQ-9 Total Score 6   If you checked off any problems, how difficult have these problems made it for you to do your work, take care of things at home, or get along with other people? Somewhat difficult       LORE-7 Anxiety Screening  Over the last two weeks, how often have you been bothered by the following problems?  Feeling nervous, anxious or on edge: More than half the days  Not being able to stop or control worrying: Several days  Worrying too much about different things: Several days  Trouble Relaxing: Not at all  Being so restless that it is hard to sit still: Not at all  Becoming easily annoyed or irritable: Several days  Feeling afraid as if something awful might happen: Not at all  LORE 7 Total Score: 5  If you checked any problems, how difficult have these problems made it for you to do your work, take care of things at home, or get along with other people: Somewhat difficult    RISK ASSESSMENT:  Patient denies any thoughts or intent of suicide today. Patient denies any impulsive behavior today.     Medications:     Current Outpatient Medications:     fexofenadine (ALLEGRA) 60 MG tablet, Take 1 tablet by mouth Daily.,  "Disp: , Rfl:     FLUoxetine (PROzac) 20 MG capsule, Take 1 capsule by mouth Daily., Disp: 90 capsule, Rfl: 0    FLUoxetine (PROzac) 40 MG capsule, Take 1 capsule by mouth Daily., Disp: 90 capsule, Rfl: 0    levETIRAcetam (KEPPRA) 1000 MG tablet, Take 1 tablet by mouth 2 (Two) Times a Day., Disp: 60 tablet, Rfl: 9    Midazolam (Nayzilam) 5 MG/0.1ML solution, Administer 0.1 mL into the nostril(s) as directed by provider As Needed (At the onset of her seizure.  May repeat once in 10 minutes if needed.)., Disp: 2 each, Rfl: 5    norgestimate-ethinyl estradiol (ORTHO-CYCLEN) 0.25-35 MG-MCG per tablet, Take 1 tablet by mouth Daily. Take in a continuous fashion, skipping placebos, Disp: 28 tablet, Rfl: 11    Medication Considerations:  RAZA reviewed and appropriate.      Allergies:   No Known Allergies       Objective     Physical Exam:  Vital Signs:   Vitals:    07/21/25 1047 07/21/25 1048   BP:  126/74   Weight: 73.2 kg (161 lb 6.4 oz)    Height: 177.8 cm (70\")      Body mass index is 23.16 kg/m².     Mental Status Exam:   Hygiene:   good   Cooperation:  Cooperative  Eye Contact:  Good  Psychomotor Behavior:  Appropriate  Affect:  Appropriate  Mood: normal  Speech:  Normal  Thought Process:  Goal directed and Linear  Thought Content:  Normal  Suicidal:  None  Homicidal:  None  Hallucinations:  None  Delusion:  None  Memory:  Intact  Orientation:  Person, Place, Time, and Situation  Reliability:  good  Insight:  Good  Judgement:  Good  Impulse Control:  Good  Physical/Medical Issues:  see problem list     Assessment / Plan      Visit Diagnosis/Orders Placed This Visit:  Diagnoses and all orders for this visit:    1. Moderate episode of recurrent major depressive disorder (Primary)  -     FLUoxetine (PROzac) 20 MG capsule; Take 1 capsule by mouth Daily.  Dispense: 90 capsule; Refill: 0  -     FLUoxetine (PROzac) 40 MG capsule; Take 1 capsule by mouth Daily.  Dispense: 90 capsule; Refill: 0    2. Generalized anxiety " disorder  -     FLUoxetine (PROzac) 20 MG capsule; Take 1 capsule by mouth Daily.  Dispense: 90 capsule; Refill: 0  -     FLUoxetine (PROzac) 40 MG capsule; Take 1 capsule by mouth Daily.  Dispense: 90 capsule; Refill: 0         Functional Status: No impairment    Prognosis: Good with Ongoing Treatment     Impression/Formulation:  Patient appeared alert and oriented.  Patient is voluntarily requesting to continue outpatient psychiatric treatment at [facility].  Patient is receptive to assistance with maintaining a stable lifestyle.  Patient presents with history of     ICD-10-CM ICD-9-CM   1. Moderate episode of recurrent major depressive disorder  F33.1 296.32   2. Generalized anxiety disorder  F41.1 300.02   .    Reviewed patient's previous provider notes. Reviewed most recent labs. Patient meets DSM V diagnostic criteria for diagnoses. Diagnoses may be updated as more information becomes available.     Assessment & Plan    - Discussed current medication regimen and its effectiveness.  - Explored sleep patterns and feelings of restfulness.  - Addressed alcohol consumption and its potential impact on overall well-being.      The patient reports that her anxiety is well-managed with her current medication regimen of Prozac 60 mg. She feels stable and denies any suicidal thoughts, thoughts of harming others, or experiencing hallucinations. Although she is sleeping approximately 9 hours per night, she does not feel well-rested and sometimes naps during the day. There have been no changes in her appetite. The patient mentioned feeling fatigued after returning from vacation, which may be contributing to her overall sense of tiredness.    - Reviewed and confirmed the effectiveness of the current medication regimen.  - Discussed the importance of monitoring alcohol consumption.      Plan:  - Continue current medication regimen, prozac 60 mg daily.  - Monitor alcohol consumption.    Follow-up:  -  Follow-up appointment scheduled for 3 months from now.        Patient will continue supportive psychotherapy efforts and medications as indicated. Clinic will obtain release of information for current treatment team for continuity of care as needed. Patient will contact this office, call [phone] or present to the nearest emergency room should suicidal or homicidal ideations occur.  Discussed medication options and treatment plan of prescribed medication(s) as well as the risks, benefits, and potential side effects. Patient acknowledged and verbally consented to continue with current treatment plan and was educated on the importance of compliance with treatment and follow-up appointments.     Patient instructions:  Medication risks and side effects discussed with patient including risk for worsening mood, changes in behavior, thoughts of suicide or homicide, induction of parul, serotonin syndrome, rare hyponatremia, rare SIADH, withdrawal symptoms if abrupt withdrawal, sexual dysfunction.   If any thoughts of SI or HI, worsening mood or changes in behavior, call 911 or crisis line [phone], or go to nearest ER at once. Pt.verbalizes understanding and consents to treatment with this medication.         Patient or patient representative verbalized consent for the use of Ambient Listening during the visit with  QUINN Hooker for chart documentation. 7/21/2025  12:45 EDT    QUINN Hooker, [facility]

## 2025-08-04 ENCOUNTER — TELEPHONE (OUTPATIENT)
Dept: NEUROLOGY | Facility: CLINIC | Age: 30
End: 2025-08-04
Payer: MEDICAID